# Patient Record
Sex: MALE | Race: WHITE | NOT HISPANIC OR LATINO | Employment: OTHER | URBAN - METROPOLITAN AREA
[De-identification: names, ages, dates, MRNs, and addresses within clinical notes are randomized per-mention and may not be internally consistent; named-entity substitution may affect disease eponyms.]

---

## 2023-01-06 ENCOUNTER — APPOINTMENT (OUTPATIENT)
Dept: CT IMAGING | Facility: CLINIC | Age: 28
End: 2023-01-06
Attending: EMERGENCY MEDICINE

## 2023-01-06 ENCOUNTER — HOSPITAL ENCOUNTER (EMERGENCY)
Facility: CLINIC | Age: 28
Discharge: ANOTHER HOSPITAL | End: 2023-01-06
Attending: EMERGENCY MEDICINE | Admitting: EMERGENCY MEDICINE

## 2023-01-06 ENCOUNTER — HOSPITAL ENCOUNTER (INPATIENT)
Facility: CLINIC | Age: 28
LOS: 4 days | Discharge: HOME | DRG: 167 | End: 2023-01-10
Attending: STUDENT IN AN ORGANIZED HEALTH CARE EDUCATION/TRAINING PROGRAM | Admitting: STUDENT IN AN ORGANIZED HEALTH CARE EDUCATION/TRAINING PROGRAM

## 2023-01-06 VITALS
HEART RATE: 85 BPM | SYSTOLIC BLOOD PRESSURE: 123 MMHG | OXYGEN SATURATION: 97 % | WEIGHT: 210 LBS | DIASTOLIC BLOOD PRESSURE: 58 MMHG | RESPIRATION RATE: 21 BRPM | TEMPERATURE: 98 F

## 2023-01-06 DIAGNOSIS — J98.59 MEDIASTINAL MASS: Primary | ICD-10-CM

## 2023-01-06 DIAGNOSIS — J98.59 MEDIASTINAL MASS: ICD-10-CM

## 2023-01-06 LAB
AFP SERPL-MCNC: 2.6 NG/ML
ALBUMIN SERPL-MCNC: 4 G/DL (ref 3.5–5)
ALP SERPL-CCNC: 57 U/L (ref 45–120)
ALT SERPL W P-5'-P-CCNC: 19 U/L (ref 0–45)
ANION GAP SERPL CALCULATED.3IONS-SCNC: 9 MMOL/L (ref 5–18)
AST SERPL W P-5'-P-CCNC: 15 U/L (ref 0–40)
BASOPHILS # BLD AUTO: 0.1 10E3/UL (ref 0–0.2)
BASOPHILS NFR BLD AUTO: 1 %
BILIRUB SERPL-MCNC: 0.5 MG/DL (ref 0–1)
BUN SERPL-MCNC: 12 MG/DL (ref 8–22)
CALCIUM SERPL-MCNC: 9.7 MG/DL (ref 8.5–10.5)
CHLORIDE BLD-SCNC: 104 MMOL/L (ref 98–107)
CO2 SERPL-SCNC: 26 MMOL/L (ref 22–31)
CREAT SERPL-MCNC: 0.83 MG/DL (ref 0.7–1.3)
D DIMER PPP FEU-MCNC: 1.5 UG/ML FEU (ref 0–0.5)
EOSINOPHIL # BLD AUTO: 0.2 10E3/UL (ref 0–0.7)
EOSINOPHIL NFR BLD AUTO: 1 %
ERYTHROCYTE [DISTWIDTH] IN BLOOD BY AUTOMATED COUNT: 13.7 % (ref 10–15)
GFR SERPL CREATININE-BSD FRML MDRD: >90 ML/MIN/1.73M2
GLUCOSE BLD-MCNC: 110 MG/DL (ref 70–125)
HCT VFR BLD AUTO: 44.9 % (ref 40–53)
HGB BLD-MCNC: 14.8 G/DL (ref 13.3–17.7)
HOLD SPECIMEN: NORMAL
IMM GRANULOCYTES # BLD: 0.1 10E3/UL
IMM GRANULOCYTES NFR BLD: 1 %
LDH SERPL L TO P-CCNC: 663 U/L (ref 125–220)
LYMPHOCYTES # BLD AUTO: 2.4 10E3/UL (ref 0.8–5.3)
LYMPHOCYTES NFR BLD AUTO: 19 %
MCH RBC QN AUTO: 30.3 PG (ref 26.5–33)
MCHC RBC AUTO-ENTMCNC: 33 G/DL (ref 31.5–36.5)
MCV RBC AUTO: 92 FL (ref 78–100)
MONOCYTES # BLD AUTO: 1.5 10E3/UL (ref 0–1.3)
MONOCYTES NFR BLD AUTO: 12 %
NEUTROPHILS # BLD AUTO: 8.6 10E3/UL (ref 1.6–8.3)
NEUTROPHILS NFR BLD AUTO: 66 %
NRBC # BLD AUTO: 0 10E3/UL
NRBC BLD AUTO-RTO: 0 /100
PLATELET # BLD AUTO: 347 10E3/UL (ref 150–450)
POTASSIUM BLD-SCNC: 4.2 MMOL/L (ref 3.5–5)
PROT SERPL-MCNC: 7.6 G/DL (ref 6–8)
RBC # BLD AUTO: 4.89 10E6/UL (ref 4.4–5.9)
SARS-COV-2 RNA RESP QL NAA+PROBE: NEGATIVE
SODIUM SERPL-SCNC: 139 MMOL/L (ref 136–145)
TROPONIN I SERPL-MCNC: <0.01 NG/ML (ref 0–0.29)
WBC # BLD AUTO: 12.9 10E3/UL (ref 4–11)

## 2023-01-06 PROCEDURE — 84702 CHORIONIC GONADOTROPIN TEST: CPT | Performed by: EMERGENCY MEDICINE

## 2023-01-06 PROCEDURE — 36415 COLL VENOUS BLD VENIPUNCTURE: CPT | Performed by: EMERGENCY MEDICINE

## 2023-01-06 PROCEDURE — 82105 ALPHA-FETOPROTEIN SERUM: CPT | Performed by: EMERGENCY MEDICINE

## 2023-01-06 PROCEDURE — 83615 LACTATE (LD) (LDH) ENZYME: CPT | Performed by: EMERGENCY MEDICINE

## 2023-01-06 PROCEDURE — 120N000002 HC R&B MED SURG/OB UMMC

## 2023-01-06 PROCEDURE — U0005 INFEC AGEN DETEC AMPLI PROBE: HCPCS | Performed by: EMERGENCY MEDICINE

## 2023-01-06 PROCEDURE — 250N000013 HC RX MED GY IP 250 OP 250 PS 637: Performed by: EMERGENCY MEDICINE

## 2023-01-06 PROCEDURE — 99222 1ST HOSP IP/OBS MODERATE 55: CPT | Performed by: STUDENT IN AN ORGANIZED HEALTH CARE EDUCATION/TRAINING PROGRAM

## 2023-01-06 PROCEDURE — 99207 PR APP CREDIT; MD BILLING SHARED VISIT: CPT | Performed by: STUDENT IN AN ORGANIZED HEALTH CARE EDUCATION/TRAINING PROGRAM

## 2023-01-06 PROCEDURE — 250N000011 HC RX IP 250 OP 636: Performed by: EMERGENCY MEDICINE

## 2023-01-06 PROCEDURE — 80053 COMPREHEN METABOLIC PANEL: CPT | Performed by: EMERGENCY MEDICINE

## 2023-01-06 PROCEDURE — 93005 ELECTROCARDIOGRAM TRACING: CPT | Performed by: EMERGENCY MEDICINE

## 2023-01-06 PROCEDURE — 85379 FIBRIN DEGRADATION QUANT: CPT | Performed by: EMERGENCY MEDICINE

## 2023-01-06 PROCEDURE — 250N000013 HC RX MED GY IP 250 OP 250 PS 637: Performed by: STUDENT IN AN ORGANIZED HEALTH CARE EDUCATION/TRAINING PROGRAM

## 2023-01-06 PROCEDURE — 71275 CT ANGIOGRAPHY CHEST: CPT

## 2023-01-06 PROCEDURE — 99285 EMERGENCY DEPT VISIT HI MDM: CPT | Mod: 25

## 2023-01-06 PROCEDURE — 84484 ASSAY OF TROPONIN QUANT: CPT | Performed by: EMERGENCY MEDICINE

## 2023-01-06 PROCEDURE — C9803 HOPD COVID-19 SPEC COLLECT: HCPCS

## 2023-01-06 PROCEDURE — 85025 COMPLETE CBC W/AUTO DIFF WBC: CPT | Performed by: EMERGENCY MEDICINE

## 2023-01-06 RX ORDER — IOPAMIDOL 755 MG/ML
75 INJECTION, SOLUTION INTRAVASCULAR ONCE
Status: COMPLETED | OUTPATIENT
Start: 2023-01-06 | End: 2023-01-06

## 2023-01-06 RX ORDER — ONDANSETRON 2 MG/ML
4 INJECTION INTRAMUSCULAR; INTRAVENOUS EVERY 6 HOURS PRN
Status: DISCONTINUED | OUTPATIENT
Start: 2023-01-06 | End: 2023-01-10 | Stop reason: HOSPADM

## 2023-01-06 RX ORDER — ONDANSETRON 4 MG/1
4 TABLET, ORALLY DISINTEGRATING ORAL EVERY 6 HOURS PRN
Status: DISCONTINUED | OUTPATIENT
Start: 2023-01-06 | End: 2023-01-10 | Stop reason: HOSPADM

## 2023-01-06 RX ORDER — LIDOCAINE 40 MG/G
CREAM TOPICAL
Status: DISCONTINUED | OUTPATIENT
Start: 2023-01-06 | End: 2023-01-10 | Stop reason: HOSPADM

## 2023-01-06 RX ORDER — ACETAMINOPHEN 325 MG/1
975 TABLET ORAL ONCE
Status: COMPLETED | OUTPATIENT
Start: 2023-01-06 | End: 2023-01-06

## 2023-01-06 RX ORDER — ACETAMINOPHEN 325 MG/1
975 TABLET ORAL EVERY 4 HOURS PRN
Status: DISCONTINUED | OUTPATIENT
Start: 2023-01-06 | End: 2023-01-09

## 2023-01-06 RX ADMIN — IOPAMIDOL 75 ML: 755 INJECTION, SOLUTION INTRAVENOUS at 08:03

## 2023-01-06 RX ADMIN — Medication 1 MG: at 20:01

## 2023-01-06 RX ADMIN — ACETAMINOPHEN 975 MG: 325 TABLET, FILM COATED ORAL at 20:01

## 2023-01-06 RX ADMIN — ACETAMINOPHEN 975 MG: 325 TABLET ORAL at 14:13

## 2023-01-06 ASSESSMENT — ACTIVITIES OF DAILY LIVING (ADL)
FALL_HISTORY_WITHIN_LAST_SIX_MONTHS: NO
WALKING_OR_CLIMBING_STAIRS_DIFFICULTY: NO
WEAR_GLASSES_OR_BLIND: YES
ADLS_ACUITY_SCORE: 20
TOILETING_ISSUES: NO
DRESSING/BATHING_DIFFICULTY: NO
ADLS_ACUITY_SCORE: 20
ADLS_ACUITY_SCORE: 35
ADLS_ACUITY_SCORE: 20
ADLS_ACUITY_SCORE: 35
DOING_ERRANDS_INDEPENDENTLY_DIFFICULTY: NO
CONCENTRATING,_REMEMBERING_OR_MAKING_DECISIONS_DIFFICULTY: NO
ADLS_ACUITY_SCORE: 35
ADLS_ACUITY_SCORE: 35
CHANGE_IN_FUNCTIONAL_STATUS_SINCE_ONSET_OF_CURRENT_ILLNESS/INJURY: NO
VISION_MANAGEMENT: GLASSES
DIFFICULTY_EATING/SWALLOWING: NO
ADLS_ACUITY_SCORE: 35

## 2023-01-06 ASSESSMENT — ENCOUNTER SYMPTOMS
ABDOMINAL PAIN: 0
DYSURIA: 0
FATIGUE: 0
WEAKNESS: 0
FEVER: 0
COUGH: 1
CHILLS: 0
NAUSEA: 0
SHORTNESS OF BREATH: 0
ARTHRALGIAS: 1
VOMITING: 0
HEADACHES: 0
NUMBNESS: 0

## 2023-01-06 NOTE — ED PROVIDER NOTES
EMERGENCY DEPARTMENT ENCOUNTER      NAME: Guero Collins  AGE: 27 year old male  YOB: 1995  MRN: [id number]  EVALUATION DATE & TIME: 1/6/2023  6:16 AM    PCP: No primary care provider on file.    ED PROVIDER: Jen Covarrubias DO      Chief Complaint   Patient presents with     Chest Pain         FINAL IMPRESSION:  1. Mediastinal mass          ED COURSE & MEDICAL DECISION MAKING:    Pertinent Labs & Imaging studies reviewed. (See chart for details)  6:35 AM I met with the patient and his significant other, obtained history, performed an initial exam, and discussed options and plan for diagnostics and treatment here in the ED.   7:23 AM I rechecked and updated the patient on initial test results. Given positive D-Dimer will obtain CT and patient is agreeable with this plan.   8:30 AM I spoke with the Radiologist about the patient's CT findings.   8:41 PM I paged for Cardiothoracic Surgery.   8:48 AM I rechecked and updated the patient and his significant other on CT results. Discussed plans for Cardiothoracic Surgery consult.   9:28 AM Cardiovascular Surgery was paged by mistake. I spoke with Ivana of Cardiovascular Surgery and she will page out to the team for further evaluation.   9:37 PM I spoke with another member of the Cardiovascular Surgery team, who recommends consulting with Cardiothoracic Surgery. I repaged for Cardiothoracic Surgery.   9:57 AM I spoke with Dr. Paez, [facility] Cardiothoracic Surgery, who recommends transfer to the [facility] for admission. Additionally recommends adding on beta-hCG, alpha-fetoprotein, and LDH, which I have ordered.   10:30 AM I rechecked and updated the patient on my conversation with Cardiothoracic Surgery and the plan for transfer to the [facility] for admission. Patient is in agreement with this plan.   11:42 AM I was updated that there are no inpatient beds available at [facility]. Will repage for Dr. Paez.   1:09 PM I rechecked and updated the patient on the plan.    1:17 PM I spoke with Dr. Bass, [facility] hospitalist, who accepts the patient in transfer for admission at the [facility]. Currently there are no beds available, but they will reassess their bed status later this afternoon and keep us updated.     1:40 PM I updated the patient on the plan for admission.     27 year old male presents to the Emergency Department for evaluation of right-sided chest pain.  Notes symptoms over past 2 to 3 days.  Radiates up into his right shoulder.  Pneumonia dry cough.  No fever.  Endorses 99 temperature a couple days ago.  He has been taking ibuprofen for symptoms.  He feels his symptoms are consistent with prior pneumonia.  No trauma.  He has reproducible pain on exam.  EKG obtained without evidence of arrhythmia or ischemia.  No exertional component.  Troponin is negative.  I feel sufficient rule ACS given length of time since symptom onset.  Mild leukocytosis on labs.  He is low risk Wells but does have a pleuritic component, D-dimer is elevated.  CT pulmonary embolism obtained and shows a large anterior mediastinal mass.  It is 9.7 x 6.8 x 6.5 cm.  Exerts mass-effect upon the heart and superior vena cava with leftward displacement.  Differential includes thymic neoplasm, complex thymic cyst or pericardial cyst.  He does have a small pleural effusion on the right which is likely reactive.  I did discuss with thoracic surgery.  Recommends transfer to the [facility].  Recommends adding on an hCG, AFP and LDH which I have ordered.  There is a delay in getting a bed.  I have him accepted by the hospitalist.  We will reassess bed status later this afternoon, then if no bed may need to touch base again with thoracic surgery to see any further recommendations.    At the conclusion of the encounter I discussed the results of all of the tests and the disposition. The questions were answered. The patient or family acknowledged understanding and was agreeable with the care plan.      Medical Decision Making    History:    Supplemental history from: Significant Other    External Record(s) reviewed: Outpatient Record    Work Up:    Chart documentation includes differential considered and any EKGs or imaging independently interpreted by provider.    In additional to work up documented, I considered the following work up: See chart documentation, if applicable.    External consultation:    Discussion of management with another provider: Cardiothoracic Surgery, Cardiovascular Surgery, Hospitalist, and Radiology    Complicating factors:    Care impacted by chronic illness: N/A    Care affected by social determinants of health: N/A    Disposition considerations: Admit.      MEDICATIONS GIVEN IN THE EMERGENCY:  Medications   iopamidol (ISOVUE-370) solution 75 mL (75 mLs Intravenous Given 1/6/23 0803)       NEW PRESCRIPTIONS STARTED AT TODAY'S ER VISIT  New Prescriptions    No medications on file          =================================================================    HPI    Patient information was obtained from: patient    Use of : N/A         Guero Collins is a 27 year old male with a pertinent history of pneumonia and former tobacco use disorder who presents to this ED via private vehicle with significant other for evaluation of chest pain.    Patient reports a 2-3 day history of right anterior chest wall pain with intermittent radiation up into his right shoulder along with an intermittent dry cough. He has been taking Ibuprofen with mild relief. Patient also endorses a fever two days ago that has since resolved. He reports a similar episode of chest pain in 2017 when he was diagnosed with pneumonia and had symptomatic resolution with a Z-Amauri. He has otherwise had no ongoing symptoms since that time. Denies any recent falls or heavy lifting. Otherwise denies abdominal pain, nausea, vomiting, changes in urinary function, numbness, paresthesias, or any other symptoms or concerns at  "this time.        REVIEW OF SYSTEMS   Review of Systems   Constitutional: Negative for chills, fatigue and fever (resolved).   Respiratory: Positive for cough (dry). Negative for shortness of breath.    Cardiovascular: Positive for chest pain (right sided).   Gastrointestinal: Negative for abdominal pain, nausea and vomiting.   Genitourinary: Negative for dysuria.   Musculoskeletal: Positive for arthralgias (right shoulder).   Skin: Negative.    Neurological: Negative for syncope, weakness, numbness and headaches.        Negative for paresthesias.   All other systems reviewed and are negative.       PAST MEDICAL HISTORY:  Past Medical History:   Diagnosis Date     Anxiety      Pneumonia of right lower lobe due to infectious organism        PAST SURGICAL HISTORY:  No past surgical history on file.        CURRENT MEDICATIONS:    No current outpatient medications on file.       ALLERGIES:  No Known Allergies    FAMILY HISTORY:  No family history on file.    SOCIAL HISTORY:   Tobacco use: Former smoker, quit \"a while ago\".  Drug use: Previous recreational marijuana use, none in the last 6 months.    VITALS:  /58 (BP Location: Left arm)   Pulse 83   Temp 98  F (36.7  C) (Tympanic)   Resp 22   Wt 95.3 kg (210 lb)   SpO2 98%     PHYSICAL EXAM    Physical Exam  Constitutional:       General: He is not in acute distress.  HENT:      Head: Normocephalic and atraumatic.      Mouth/Throat:      Pharynx: Oropharynx is clear.   Eyes:      Pupils: Pupils are equal, round, and reactive to light.   Cardiovascular:      Rate and Rhythm: Normal rate and regular rhythm.      Pulses: Normal pulses.      Heart sounds: Normal heart sounds.   Pulmonary:      Effort: Pulmonary effort is normal.      Breath sounds: Normal breath sounds.   Chest:      Chest wall: Tenderness (right anterior chest wall) present.   Abdominal:      General: Abdomen is flat. Bowel sounds are normal.      Palpations: Abdomen is soft.      Tenderness: " There is no abdominal tenderness.   Musculoskeletal:         General: Normal range of motion.   Skin:     General: Skin is warm and dry.      Capillary Refill: Capillary refill takes less than 2 seconds.   Neurological:      General: No focal deficit present.      Mental Status: He is alert and oriented to person, place, and time.             LAB:  All pertinent labs reviewed and interpreted.  Labs Ordered and Resulted from Time of ED Arrival to Time of ED Departure   D DIMER QUANTITATIVE - Abnormal       Result Value    D-Dimer Quantitative 1.50 (*)    CBC WITH PLATELETS AND DIFFERENTIAL - Abnormal    WBC Count 12.9 (*)     RBC Count 4.89      Hemoglobin 14.8      Hematocrit 44.9      MCV 92      MCH 30.3      MCHC 33.0      RDW 13.7      Platelet Count 347      % Neutrophils 66      % Lymphocytes 19      % Monocytes 12      % Eosinophils 1      % Basophils 1      % Immature Granulocytes 1      NRBCs per 100 WBC 0      Absolute Neutrophils 8.6 (*)     Absolute Lymphocytes 2.4      Absolute Monocytes 1.5 (*)     Absolute Eosinophils 0.2      Absolute Basophils 0.1      Absolute Immature Granulocytes 0.1      Absolute NRBCs 0.0     LACTATE DEHYDROGENASE - Abnormal    Lactate Dehydrogenase 663 (*)    COMPREHENSIVE METABOLIC PANEL - Normal    Sodium 139      Potassium 4.2      Chloride 104      Carbon Dioxide (CO2) 26      Anion Gap 9      Urea Nitrogen 12      Creatinine 0.83      Calcium 9.7      Glucose 110      Alkaline Phosphatase 57      AST 15      ALT 19      Protein Total 7.6      Albumin 4.0      Bilirubin Total 0.5      GFR Estimate >90     TROPONIN I - Normal    Troponin I <0.01     COVID-19 VIRUS (CORONAVIRUS) BY PCR - Normal    SARS CoV2 PCR Negative     HCG TUMOR MARKER   AFP TUMOR MARKER       RADIOLOGY:  Reviewed all pertinent imaging. Please see official radiology report.  CT Chest Pulmonary Embolism w Contrast   Final Result   IMPRESSION:   1.  Large anterior mediastinal lesion. Recommend chest MRI  with contrast and thoracic surgical referral.   2.  No evidence pulmonary embolism.   3.  Small right pleural effusion which could be reactive.      Report called to Dr. Covarrubias at 8:30 AM.          EKG:    Performed at: 06:16    Impression: Sinus rhythm with sinus arrhythmia. Incomplete RBBB. Borderline EKG.      Rate: 82 bpm  Rhythm: Sinus  Axis: 74  NM Interval: 146 ms  QRS Interval: 96 ms  QTc Interval: 394 ms  ST Changes: None  Comparison: No previous EKG available for comparison    I have independently reviewed and interpreted the EKG(s) documented above.        I, Evelyn Alexandra, am serving as a scribe to document services personally performed by Dr. Jen Covarrubias based on my observation and the provider's statements to me. IJen, DO attest that Evelyn Alexandra is acting in a scribe capacity, has observed my performance of the services and has documented them in accordance with my direction.    Jen Covarrubias DO  Emergency Medicine  [facility] EMERGENCY ROOM  [id number] [id number] [phone]  Dept: [phone]      Jen Covarrubias DO  01/06/23 2161

## 2023-01-06 NOTE — PROGRESS NOTES
Pharmacy Note - Admission Medication History    Pertinent Provider Information:     Patient takes no medications.      ______________________________________________________________________    Prior To Admission (PTA) med list completed and updated in EMR.       No outpatient medications have been marked as taking for the 1/6/23 encounter (Hospital Encounter).       Information source(s): Patient and CareEverywhere/SureScripts  Method of interview communication: phone    Summary of Changes to PTA Med List  New: none  Discontinued: Zyrtec and Flonase - one time fill in August 2022, no longer taking  Changed: none    Patient was asked about OTC/herbal products specifically.  PTA med list reflects this.    In the past week, patient estimated taking medication this percent of the time:  N/A    Allergies were reviewed, assessed, and updated with the patient.      Patient does not use any multi-dose medications prior to admission.    The information provided in this note is only as accurate as the sources available at the time of the update(s).    Thank you for the opportunity to participate in the care of this patient.    Jennifer Sharp RPH  1/6/2023 3:24 PM

## 2023-01-06 NOTE — CARE PLAN
Reason for admission: R side chest pain, found to have mediastinal mass requiring transfer & admission to [facility] for work up   Admitted from: [facility] ED   Report received from: Reji ED RN   2 RN skin assessment completed by:Pt declined full skin assessment        -Findings: no overt deficits   Bed Algorithm reevaluated: yes   Was Pulsate ordered?:  No   Care plan (primary problem) and Education initiated: yes   Flu shot ordered (October-April only): Already has most recent vaccine   Detailed Belongings: shoes, pants, sweatshirt, water bottle, wallet w/ cards & small amount of cash, nose piercing, ring, laptop & , phone and , glasses      RN Decompensation Assessment (Repeat at 12 hours)      Mentation:  Exam is notable for normal (baseline) mental status    Respiratory mechanics and oxygenation:  Exam is notable for normal/unchanged breathing pattern and stable oxygen requirements    Cardiovascular/perfusion:   Exam is notable for normal/unchanged cardiovascular/perfusion assessment    Overall estimation of the patient s condition/stability (1 = stable patient, 7 = appears very sick)? 1 - Patient is stable without signs of deterioration

## 2023-01-06 NOTE — PROGRESS NOTES
[facility]  Transfer Triage Note    Date of call: 01/06/23  Time of call: 1:17 PM    Current Patient Location:  ED  Current Level of Care: ED    Vitals: Temp: 98  F (36.7  C) Temp src: Tympanic BP: 123/58 Pulse: 83   Resp: 22 SpO2: 98 %   Weight: 95.3 kg (210 lb)  O2 Device: None (Room air) at    Diagnosis: mediastinal mass  Is COVID-19 a concern? No  Reason for requested transfer: Procedure can be done here and not at referring hospital   Isolation Needs: Contact    Outside Records: Available  Additional records may be faxed to [phone].    Transfer accepted: Yes  Stability of Patient: Patient is vitally stable, with no critical labs, and will likely remain stable throughout the transfer process  Level of Care Needed: Med Surg  Telemetry Needed:  None  Expected Time of Arrival for Transfer: 0-8 hours  Arrival Location:  [facility]    Recommendations for Management and Stabilization: Not needed    Additional Comments: 26 yo male presenting to  ED with new mediastinal mass.  ED provider discussed with thoracic surgery who recommended transfer to [facility] for thoracic and oncology consultations.  Patient with no other significant past medical history.   Transfer accepted.    Aston Bass MD

## 2023-01-06 NOTE — H&P
"[facility]    History and Physical - Hospitalist Service, GOLD TEAM        Date of Admission:  1/6/2023    Assessment & Plan      Guero Collins is a 27 year old male without significant past medical history. He presented to [facility] this morning (1/6) with reports of chest pain. ACS was ruled out but D-Dimer elevated. CT-PE study thus obtained and showed a large 9.7 x 6.8 x 6.5 cm mediastinal mass. ED provider discussed case with thoracic surgery who advised transfer to the [facility] for further evaluation and biopsy with thoracic surgery and also oncology consult.     # Large Mediastinal Mass  Relatively healthy young male with 4 days of R sided chest pain. CT-PE study at OSH showed a large 9.7 x 6.8 x 6.5 cm mediastinal mass. No active B-cell symptoms apart from low grade fever 2 days ago.  LDH elevated at 663. AFP tumor marker normal. No facial flushing or edema concerning for SVC syndrome. Differential includes lymphoma, thymoma, or pericardial cyst.   - HCG tumor marker pending   - Regular diet for now but will make NPO at midnight incase of possible biopsy tomorrow   - Consulted thoracic surgery for biopsy   - Consulted oncology preemptively   - Repeat CBC and CMP in the AM   - Consider obtaining echocardiogram with signs of SVC           Diet: Combination Diet Regular Diet Adult  NPO per Anesthesia Guidelines for Procedure/Surgery Except for: Meds    DVT Prophylaxis: Low Risk/Ambulatory with no VTE prophylaxis indicated  Bryant Catheter: Not present  Lines: None     Cardiac Monitoring: None  Code Status: Full Code      Clinically Significant Risk Factors Present on Admission                       # Obesity: Estimated body mass index is 31.09 kg/m  as calculated from the following:    Height as of this encounter: 1.753 m (5' 9\").    Weight as of this encounter: 95.5 kg (210 lb 8 oz).           Disposition Plan      Expected Discharge Date: 01/08/2023           "      The patient's care was discussed with the Attending Physician, Dr. Schilling, Bedside Nurse, Patient and Patient's Family.    Juan Pablo Diaz PA-C  Hospitalist Service, [facility]  Securely message with brotips (more info)  Text page via [facility] Paging/Directory     ______________________________________________________________________    Chief Complaint   Mediastinal Mass    History is obtained from the patient    History of Present Illness   Guero Collins is a 27 year old male without significant past medical history. He presented to [facility] this morning (1/6) with reports of chest pain. ACS was ruled out but D-Dimer elevated. CT-PE study thus obtained and showed a large 9.7 x 6.8 x 6.5 cm mediastinal mass. ED provider discussed case with thoracic surgery who advised transfer to the [facility] for further evaluation and biopsy with thoracic surgery and also oncology consult.     Pt seen with wife at bedside. He overall reports feeling ok and is in fair spirits. He reports R sided chest pain starting about 4 days ago. It radiated to his right arm. Pain worse with physical activity (lifting objects, exercise) and lying on his back. Pain improved with ibuprofen at home and tylenol in the ED. He does reports some SOB with activity. He does reports 1 low grade fever to 99F. Denies sweats. Denies abdominal pain, N/V/D. He denies family history of cancer, lymphoma or thyroid disease. He did have 1 grandfather with possible heart disease.     Review of Systems    The 10 point Review of Systems is negative other than noted in the HPI or here.      Past Medical History    I have reviewed this patient's medical history and updated it with pertinent information if needed.   Past Medical History:   Diagnosis Date     Anxiety      Pneumonia of right lower lobe due to infectious organism        Past Surgical History   I have reviewed this patient's surgical history and  updated it with pertinent information if needed.  No past surgical history on file.    Social History   I have reviewed this patient's social history and updated it with pertinent information if needed.  Social History     Tobacco Use     Smoking status: Former     Types: Cigarettes   Substance Use Topics     Drug use: Not Currently     Types: Marijuana     Comment: None in the last 6 months       Family History     Denies family hx of cancer, lymphoma or thyroid disease     Prior to Admission Medications   None     Allergies   No Known Allergies    Physical Exam   Vital Signs: Temp: 98.9  F (37.2  C) Temp src: Oral BP: (!) 156/57 Pulse: 80   Resp: 22 SpO2: 97 % O2 Device: None (Room air)    Weight: 210 lbs 8 oz    Constitutional: awake, alert, cooperative, in no acute distress. A&Ox3    Eyes: EOM, PERRLA. Sclera clear, conjunctiva normal. Anicteric   HENT: Normocephalic, without obvious abnormality, atraumatic.   Respiratory: No increased work of breathing. Clear to auscultation bilaterally, no crackles or wheezing  Cardiovascular: RRR. No murmur. No chest wall tenderness.  GI: Soft, non-tender without rebound or guarding. Bowel sounds are active.   Skin: no bruising or bleeding. Normal skin color, No jaundice  Musculoskeletal:  Full range of motion noted.    Neurologic: Cranial nerves II-XII are grossly intact.   Neuropsychiatric: General: normal, calm and normal eye contact. Normal affect        Data   Data reviewed today: I reviewed all medications, new labs and imaging results over the last 24 hours. See A/P for discussion on these results.     Recent Labs   Lab 01/06/23  0656   WBC 12.9*   HGB 14.8   MCV 92         POTASSIUM 4.2   CHLORIDE 104   CO2 26   BUN 12   CR 0.83   ANIONGAP 9   SHIMA 9.7      ALBUMIN 4.0   PROTTOTAL 7.6   BILITOTAL 0.5   ALKPHOS 57   ALT 19   AST 15       Recent Results (from the past 24 hour(s))   CT Chest Pulmonary Embolism w Contrast    Narrative    EXAM: CT CHEST  PULMONARY EMBOLISM W CONTRAST  LOCATION: [facility]  DATE/TIME: 1/6/2023 8:07 AM    INDICATION: Chest pain, elevated dimer  COMPARISON: None.  TECHNIQUE: CT chest pulmonary angiogram during arterial phase injection of IV contrast. Multiplanar reformats and MIP reconstructions were performed. Dose reduction techniques were used.   CONTRAST: Isovue 370 75ml    FINDINGS:  ANGIOGRAM CHEST: Pulmonary arteries are normal caliber and negative for pulmonary emboli. Thoracic aorta is negative for dissection. No CT evidence of right heart strain.    LUNGS AND PLEURA: Small right pleural effusion. This may be reactive. Lungs appear clear.    MEDIASTINUM/AXILLAE: Large right anterior paramediastinal heterogeneous hypodense lesion (43 HU) and is 9.7 x 6.8 x 6.5 cm. This exerts mass effect upon the heart and superior vena cava with leftward displacement. Differential considerations are thymic   neoplasm, complex thymic cyst or pericardial cyst.    CORONARY ARTERY CALCIFICATION: None.    UPPER ABDOMEN: Normal.    MUSCULOSKELETAL: Normal.      Impression    IMPRESSION:  1.  Large anterior mediastinal lesion. Recommend chest MRI with contrast and thoracic surgical referral.  2.  No evidence pulmonary embolism.  3.  Small right pleural effusion which could be reactive.    Report called to Dr. Covarrubias at 8:30 AM.

## 2023-01-06 NOTE — ED TRIAGE NOTES
Pt arrives with chest pain which he awoke from at 5 this morning. Pt states the past 2-3 days he has had some right sided shoulder and neck pain which since moved into his chest. Pain rated 9/10 and is sharp and achy.      Triage Assessment     Row Name 01/06/23 0611       Triage Assessment (Adult)    Airway WDL WDL       Respiratory WDL    Respiratory WDL WDL       Skin Circulation/Temperature WDL    Skin Circulation/Temperature WDL WDL       Cardiac WDL    Cardiac WDL X;chest pain       Chest Pain Assessment    Chest Pain Location shoulder, right;anterior chest, right    Precipitating Factors at rest       Peripheral/Neurovascular WDL    Peripheral Neurovascular WDL WDL       Cognitive/Neuro/Behavioral WDL    Cognitive/Neuro/Behavioral WDL WDL

## 2023-01-07 ENCOUNTER — APPOINTMENT (OUTPATIENT)
Dept: CT IMAGING | Facility: CLINIC | Age: 28
DRG: 167 | End: 2023-01-07
Attending: STUDENT IN AN ORGANIZED HEALTH CARE EDUCATION/TRAINING PROGRAM

## 2023-01-07 ENCOUNTER — APPOINTMENT (OUTPATIENT)
Dept: ULTRASOUND IMAGING | Facility: CLINIC | Age: 28
DRG: 167 | End: 2023-01-07
Attending: STUDENT IN AN ORGANIZED HEALTH CARE EDUCATION/TRAINING PROGRAM

## 2023-01-07 ENCOUNTER — APPOINTMENT (OUTPATIENT)
Dept: MRI IMAGING | Facility: CLINIC | Age: 28
DRG: 167 | End: 2023-01-07
Attending: STUDENT IN AN ORGANIZED HEALTH CARE EDUCATION/TRAINING PROGRAM

## 2023-01-07 LAB
ALBUMIN SERPL BCG-MCNC: 4.3 G/DL (ref 3.5–5.2)
ALP SERPL-CCNC: 54 U/L (ref 40–129)
ALT SERPL W P-5'-P-CCNC: 17 U/L (ref 10–50)
ANION GAP SERPL CALCULATED.3IONS-SCNC: 14 MMOL/L (ref 7–15)
AST SERPL W P-5'-P-CCNC: 20 U/L (ref 10–50)
BILIRUB SERPL-MCNC: 0.5 MG/DL
BUN SERPL-MCNC: 11.3 MG/DL (ref 6–20)
CALCIUM SERPL-MCNC: 9.2 MG/DL (ref 8.6–10)
CHLORIDE SERPL-SCNC: 104 MMOL/L (ref 98–107)
CREAT SERPL-MCNC: 0.79 MG/DL (ref 0.67–1.17)
DEPRECATED HCO3 PLAS-SCNC: 22 MMOL/L (ref 22–29)
ERYTHROCYTE [DISTWIDTH] IN BLOOD BY AUTOMATED COUNT: 13.5 % (ref 10–15)
GFR SERPL CREATININE-BSD FRML MDRD: >90 ML/MIN/1.73M2
GLUCOSE SERPL-MCNC: 114 MG/DL (ref 70–99)
HCT VFR BLD AUTO: 43.7 % (ref 40–53)
HGB BLD-MCNC: 14.2 G/DL (ref 13.3–17.7)
MCH RBC QN AUTO: 29.6 PG (ref 26.5–33)
MCHC RBC AUTO-ENTMCNC: 32.5 G/DL (ref 31.5–36.5)
MCV RBC AUTO: 91 FL (ref 78–100)
PLATELET # BLD AUTO: 334 10E3/UL (ref 150–450)
POTASSIUM SERPL-SCNC: 4 MMOL/L (ref 3.4–5.3)
PROT SERPL-MCNC: 7.1 G/DL (ref 6.4–8.3)
RBC # BLD AUTO: 4.79 10E6/UL (ref 4.4–5.9)
SODIUM SERPL-SCNC: 140 MMOL/L (ref 136–145)
WBC # BLD AUTO: 10.3 10E3/UL (ref 4–11)

## 2023-01-07 PROCEDURE — A9585 GADOBUTROL INJECTION: HCPCS | Performed by: STUDENT IN AN ORGANIZED HEALTH CARE EDUCATION/TRAINING PROGRAM

## 2023-01-07 PROCEDURE — 85027 COMPLETE CBC AUTOMATED: CPT | Performed by: STUDENT IN AN ORGANIZED HEALTH CARE EDUCATION/TRAINING PROGRAM

## 2023-01-07 PROCEDURE — 36415 COLL VENOUS BLD VENIPUNCTURE: CPT | Performed by: STUDENT IN AN ORGANIZED HEALTH CARE EDUCATION/TRAINING PROGRAM

## 2023-01-07 PROCEDURE — 76870 US EXAM SCROTUM: CPT | Mod: 26 | Performed by: RADIOLOGY

## 2023-01-07 PROCEDURE — 120N000002 HC R&B MED SURG/OB UMMC

## 2023-01-07 PROCEDURE — 70491 CT SOFT TISSUE NECK W/DYE: CPT

## 2023-01-07 PROCEDURE — 74177 CT ABD & PELVIS W/CONTRAST: CPT

## 2023-01-07 PROCEDURE — 71260 CT THORAX DX C+: CPT | Mod: 26 | Performed by: RADIOLOGY

## 2023-01-07 PROCEDURE — 93976 VASCULAR STUDY: CPT

## 2023-01-07 PROCEDURE — 74177 CT ABD & PELVIS W/CONTRAST: CPT | Mod: 26 | Performed by: RADIOLOGY

## 2023-01-07 PROCEDURE — 70553 MRI BRAIN STEM W/O & W/DYE: CPT

## 2023-01-07 PROCEDURE — 99418 PROLNG IP/OBS E/M EA 15 MIN: CPT | Mod: GC | Performed by: STUDENT IN AN ORGANIZED HEALTH CARE EDUCATION/TRAINING PROGRAM

## 2023-01-07 PROCEDURE — 70553 MRI BRAIN STEM W/O & W/DYE: CPT | Mod: 26 | Performed by: RADIOLOGY

## 2023-01-07 PROCEDURE — 250N000011 HC RX IP 250 OP 636: Performed by: STUDENT IN AN ORGANIZED HEALTH CARE EDUCATION/TRAINING PROGRAM

## 2023-01-07 PROCEDURE — 99232 SBSQ HOSP IP/OBS MODERATE 35: CPT | Performed by: STUDENT IN AN ORGANIZED HEALTH CARE EDUCATION/TRAINING PROGRAM

## 2023-01-07 PROCEDURE — 99221 1ST HOSP IP/OBS SF/LOW 40: CPT | Mod: GC | Performed by: THORACIC SURGERY (CARDIOTHORACIC VASCULAR SURGERY)

## 2023-01-07 PROCEDURE — 250N000013 HC RX MED GY IP 250 OP 250 PS 637: Performed by: STUDENT IN AN ORGANIZED HEALTH CARE EDUCATION/TRAINING PROGRAM

## 2023-01-07 PROCEDURE — 70491 CT SOFT TISSUE NECK W/DYE: CPT | Mod: 26 | Performed by: RADIOLOGY

## 2023-01-07 PROCEDURE — 255N000002 HC RX 255 OP 636: Performed by: STUDENT IN AN ORGANIZED HEALTH CARE EDUCATION/TRAINING PROGRAM

## 2023-01-07 PROCEDURE — 80053 COMPREHEN METABOLIC PANEL: CPT | Performed by: STUDENT IN AN ORGANIZED HEALTH CARE EDUCATION/TRAINING PROGRAM

## 2023-01-07 PROCEDURE — 99223 1ST HOSP IP/OBS HIGH 75: CPT | Mod: GC | Performed by: STUDENT IN AN ORGANIZED HEALTH CARE EDUCATION/TRAINING PROGRAM

## 2023-01-07 RX ORDER — IOPAMIDOL 755 MG/ML
120 INJECTION, SOLUTION INTRAVASCULAR ONCE
Status: COMPLETED | OUTPATIENT
Start: 2023-01-07 | End: 2023-01-07

## 2023-01-07 RX ORDER — GADOBUTROL 604.72 MG/ML
0.1 INJECTION INTRAVENOUS ONCE
Status: COMPLETED | OUTPATIENT
Start: 2023-01-07 | End: 2023-01-07

## 2023-01-07 RX ADMIN — GADOBUTROL 9.5 ML: 604.72 INJECTION INTRAVENOUS at 16:57

## 2023-01-07 RX ADMIN — IOPAMIDOL 120 ML: 755 INJECTION, SOLUTION INTRAVENOUS at 12:43

## 2023-01-07 RX ADMIN — ACETAMINOPHEN 975 MG: 325 TABLET, FILM COATED ORAL at 13:38

## 2023-01-07 RX ADMIN — ACETAMINOPHEN 975 MG: 325 TABLET, FILM COATED ORAL at 20:17

## 2023-01-07 RX ADMIN — Medication 1 MG: at 20:17

## 2023-01-07 RX ADMIN — ACETAMINOPHEN 975 MG: 325 TABLET, FILM COATED ORAL at 05:07

## 2023-01-07 ASSESSMENT — ACTIVITIES OF DAILY LIVING (ADL)
ADLS_ACUITY_SCORE: 20

## 2023-01-07 NOTE — CONSULTS
27 year old male without significant past medical history. He presented to [facility] this morning (1/6) with reports of chest pain. CT-PE study thus obtained and showed a large 9.7 x 6.8 x 6.5 cm anterior mediastinal mass. Transfer to the [facility] for further evaluation and biopsy with thoracic surgery and also oncology consult. Interventional radiology consulted for possible mediastinal mass biopsy.     No active B-cell symptoms apart from low grade fever 2 days PTA.  LDH elevated at 663. AFP tumor marker normal. No facial flushing or edema concerning for SVC syndrome. Differential includes lymphoma, thymoma, or pericardial cyst.     Patient to be discussed at morning rounds on Monday, 1/9/22.     These findings were conveyed via telephone to Duncan Powell MD on 1/7/23.     Case discussed with IR attending Howard Kuo MD.

## 2023-01-07 NOTE — PROGRESS NOTES
"[facility]    Medicine Progress Note - Hospitalist Service, GOLD TEAM 11    Date of Admission:  1/6/2023    Assessment & Plan   27 year old male without significant past medical history. He presented to [facility] this morning (1/6) with reports of chest pain. CT-PE study thus obtained and showed a large 9.7 x 6.8 x 6.5 cm anterior mediastinal mass. Transfer to the [facility] for further evaluation and biopsy with thoracic surgery and also oncology consult.      # Large Anterior Mediastinal Mass  #  Mass effect upon the heart and superior vena cava with leftward displacement  Relatively healthy young male with 4 days of R sided chest pain. CT-PE study at OSH showed a large 9.7 x 6.8 x 6.5 cm mediastinal mass. No active B-cell symptoms apart from low grade fever 2 days PTA.  LDH elevated at 663. AFP tumor marker normal. No facial flushing or edema concerning for SVC syndrome. Differential includes lymphoma, thymoma, or pericardial cyst.   Plan:  - HCG tumor marker pending   - Thoracic following  - Oncology following. Recommend CT neck and C/A/P with contrast and MRI brain with and without contrast. Scrotal U/S.   - IR consult. Bx on Monday after morning rounds. Per radiology likely to happen then. NPO after midight on Sunday  - TTE ordered       # Obesity: Estimated body mass index is 31.09 kg/m  as calculated from the following:    Height as of this encounter: 1.753 m (5' 9\").    Weight as of this encounter: 95.5 kg (210 lb 8 oz).         Diet: Regular Diet Adult    DVT Prophylaxis: Low Risk/Ambulatory with no VTE prophylaxis indicated  Bryant Catheter: Not present  Lines: None     Cardiac Monitoring: None  Code Status: Full Code      Clinically Significant Risk Factors Present on Admission                              Disposition Plan      Expected Discharge Date: 01/08/2023                  ABRAHAN SUNSHINE MD  Hospitalist Service, GOLD TEAM 11  [facility] " [facility]  Securely message with [employer] (more info)  Text page via [employer] Paging/Directory   See signed in provider for up to date coverage information  ______________________________________________________________________    Interval History   Pt with family members in room. Updated about the consults and pending recs for next steps. Pt still has CP when lying on his back. Fevers and some of the pain is relieved by Tylenol.  Dale sign neg     Physical Exam   Vital Signs: Temp: 99.5  F (37.5  C) Temp src: Oral BP: 137/53 Pulse: 75   Resp: 20 SpO2: 99 % O2 Device: None (Room air)    Weight: 210 lbs 8 oz    Constitutional: Lying in bed with no acute distress   Respiratory: Breathing comfortably on RA    Medical Decision Making       45 MINUTES SPENT BY ME on the date of service doing chart review, history, exam, documentation & further activities per the note.      Data

## 2023-01-07 NOTE — PLAN OF CARE
Goal Outcome Evaluation:    RN assumed cares [id number]    Pt A&Ox4 VSS on RA except HTN. Pt reported pain 9/10 and received Tylenol at 2001, 0507. R PIV SL and flushing well. Pt NPO since midnight and resting between cares.

## 2023-01-07 NOTE — PLAN OF CARE
Pt admitted to unit around 1700, traveled from [facility] via private car. VSS on RA. AOx4. C/o R side chest pain however improved compared to this AM after PRN tylenol- pt says pain is radiating to shoulder less. Reports mild NOEL & intermittent dry cough. Tolerating regular diet. Denies toileting concerns. Ind. NPO at 0000 for possible biopsy of mass in AM. Oncology & thoracic surgery consults placed. Significant other supportive at bedside. Call light within reach, able to make needs known. Will continue to monitor & follow POC    Goal Outcome Evaluation:    Plan of Care Reviewed With: patient    Overall Patient Progress: no change    Outcome Evaluation: Pt admitted to unit this evening for R side chest pain & mediastinal mass. Pain managed w/ PRN tylenol. VSS. Mild NOEL & intermittent dry cough. Plan for biopsy of mass tomorrow

## 2023-01-07 NOTE — CONSULTS
"  Oncology  Consult Note   Date of Service: 01/07/2023    Patient: Guero Collins  MRN: [id number]  Admission Date: 1/6/2023  Hospital Day # 1    Reason for Consult: Mediastinal mass    Assessment & Plan:   Guero Collins is a 27 year old male without significant past medical history who presented with chest pain and found to have a large mediastinal mass.    Mediastinal mass  New large mediastinal mass in an otherwise healthy patient. No B-symptoms, no lymphadenopathy, or scrotal mass on the exam. Differentials for anterior mediastinal mass include germ cell tumor, lymphoma, thyroid or thymic neoplasm, with the first two being higher in our differentials. LDH is elevated. AFP was normal. HCG is pending. At this time, the most important next step is to get the tissue diagnosis. We will complete work-up with scrotal US, CT CAP, and MRI brain. Work-up and treatment can be done outpatient from our standpoint. We will coordinate timing of follow-up at oncology clinic once we know the plan for biopsy.    Recommendations:   - Follow hCG result  - Scrotal US (ordered for you)  - CT neck and CAP with contrast (ordered for you)  - MRI brain with and without contrast (ordered for you)  - Agree with thoracic surgery and IR consult for biopsy. Please obtain \"core biopsy\" (not FNA) and send for pathology and flow cytometry.  - Discharge plan per primary team. Work-up and treatment can be done outpatient from our standpoint. We will coordinate timing of follow-up at oncology clinic once we know the plan for biopsy.    We will continue to follow this patient. Please do not hesitate to page with any questions or concerns.    Patient was seen and plan of care was discussed with attending physician Dr. Resendiz.    Ghulam Becerra MD  Hematology/Medical Oncology (PGY-4)  P: [phone]  ------------------------------------------------------------------------------------------------------------------------------    History of " Present Illness:    Guero Collins is a 27 year old male without significant past medical history who presented with chest pain and found to have a large mediastinal mass.    Patient reports about 4 days of R shoulder pain and 2 days of R-sided chest pain that prompted him to seek medical attention. Pain worse with physical activity (lifting objects, exercise) and lying on his back. Pain improved with ibuprofen at home and tylenol in the ED. He does reports some SOB with activity. He does reports 1 low grade fever to 99F. Have night sweats at baseline. Denies loss of appetite or unexplained weight loss. Denies abdominal pain, N/V/D. Denies enlarged glands or lymph nodes. He denies family history of cancer, lymphoma or thyroid disease.    He presented to [facility] this morning (1/6) with reports of chest pain. ACS was ruled out but D-Dimer elevated. CT-PE study thus obtained and showed a large 9.7 x 6.8 x 6.5 cm mediastinal mass. ED provider discussed case with thoracic surgery who advised transfer to the [facility] for further evaluation and biopsy with thoracic surgery and also oncology consult.     Review of Systems:  A comprehensive ROS was performed and found to be negative or non-contributory with the exception of that noted in the HPI above.    Past Medical History:  Past Medical History:   Diagnosis Date     Anxiety      Pneumonia of right lower lobe due to infectious organism      Past Surgical History:  No past surgical history on file.    Social History:  Social History     Socioeconomic History     Marital status: Single   Tobacco Use     Smoking status: Former     Types: Cigarettes   Substance and Sexual Activity     Drug use: Not Currently     Types: Marijuana     Comment: None in the last 6 months      Family History  No family history on file.    Outpatient Medications:  [COMPLETED] acetaminophen (TYLENOL) tablet 975 mg    No current outpatient medications on file prior to encounter.     Physical  "Exam:    Blood pressure 137/53, pulse 75, temperature 99.5  F (37.5  C), temperature source Oral, resp. rate 20, height 1.753 m (5' 9\"), weight 95.5 kg (210 lb 8 oz), SpO2 99 %.  General: Alert and cooperative,  no acute distress  HEENT: Sclera anicteric, EOMI, MMM  Lymphatic: No cervical, supraclavicular, axillary, or inguinal lymphadenopathy.   CV: RRR, no murmurs  Resp: CTAB, normal respiratory effort on ambient air  GI: Soft, non-tender, non-distended, bowel sounds present and normoactive. No hepatosplenomegaly.  Genital: No scrotal mass.  MSK: Warm and well-perfused, no edema  Skin: No rashes or petechiae  Neuro: AOx3, moves all extremities equally, no focal deficits    Labs & Studies: I personally reviewed the following studies:  ROUTINE LABS (Last four results):  CMP  Recent Labs   Lab 01/07/23  0715 01/06/23  0656    139   POTASSIUM 4.0 4.2   CHLORIDE 104 104   CO2 22 26   ANIONGAP 14 9   * 110   BUN 11.3 12   CR 0.79 0.83   GFRESTIMATED >90 >90   SHIMA 9.2 9.7   PROTTOTAL 7.1 7.6   ALBUMIN 4.3 4.0   BILITOTAL 0.5 0.5   ALKPHOS 54 57   AST 20 15   ALT 17 19     CBC  Recent Labs   Lab 01/07/23  0715 01/06/23  0656   WBC 10.3 12.9*   RBC 4.79 4.89   HGB 14.2 14.8   HCT 43.7 44.9   MCV 91 92   MCH 29.6 30.3   MCHC 32.5 33.0   RDW 13.5 13.7    347     Labs, images, and pathology results were reviewed and discussed as pertinent in oncologic history and patient assessment.    "

## 2023-01-07 NOTE — CONSULTS
Thoracic Surgery Consultation Note  [facility]    Guero Collins MRN# [id number]   Age: 27 year old YOB: 1995     Date of Admission:  1/6/2023    Reason for consult: Anterior mediastinal mass       Requesting physician: Dr. Schilling       Level of consult: Consult, follow and place orders           Assessment and Recommendations   27M who presented with chest pain 1/6/23 found to have an anterior mediastinal mass. No B-symptoms, , AFP wnl, beta-HCG pending at time of note, no signs/symptoms of SVC syndrome, and chest pain has now largely resolved.     - IR consult for possible biopsy  - Agree with oncology consult  - Pending findings with biopsy, thoracic surgery will discuss further plans with multidisciplinary team  - If unable to get biopsy over the weekend, patient could potentially go home and have this workup completed as an outpatient.    Seen and discussed with fellow who will discuss with staff.     Lefty Mix MD  Surgery PGY-3          History of Present Illness:   CC: anterior mediastinal mass     History is obtained from the patient    27M who presented with chest pain 1/6 found to have an anterior mediastinal mass. No B-symptoms, , AFP wnl, beta-HCG pending at time of note, no signs/symptoms of SVC syndrome, and chest pain has now largely resolved. Denies any history of testicular cancer or family history of testicular cancer. Has scalp lesions (3) that he states have been there for years and have not been enlarging. No known family history of lymphoma, thymoma.     Seen in his room with his fiance. Explained thoracic surgery involvement for these cases and how more information would need to be gathered prior to discussing any sort of operative intervention. All questions answered.           Past Medical History:     Past Medical History:   Diagnosis Date     Anxiety      Pneumonia of right lower lobe due to infectious organism              Past  "Surgical History:   No past surgical history on file.          Social History:     Social History     Socioeconomic History     Marital status: Single     Spouse name: Not on file     Number of children: Not on file     Years of education: Not on file     Highest education level: Not on file   Occupational History     Not on file   Tobacco Use     Smoking status: Former     Types: Cigarettes     Smokeless tobacco: Not on file   Substance and Sexual Activity     Alcohol use: Not on file     Drug use: Not Currently     Types: Marijuana     Comment: None in the last 6 months     Sexual activity: Not on file   Other Topics Concern     Not on file   Social History Narrative     Not on file     Social Determinants of Health     Financial Resource Strain: Not on file   Food Insecurity: Not on file   Transportation Needs: Not on file   Physical Activity: Not on file   Stress: Not on file   Social Connections: Not on file   Intimate Partner Violence: Not on file   Housing Stability: Not on file             Family History:   No family history on file.          Allergies:    No Known Allergies          Medications:   [COMPLETED] acetaminophen (TYLENOL) tablet 975 mg  [COMPLETED] iopamidol (ISOVUE-370) solution 75 mL    No current outpatient medications on file prior to encounter.            Review of Systems:      All other review of systems negative, except for what is mentioned above        Physical Exam:   /53 (BP Location: Left arm)   Pulse 75   Temp 99.5  F (37.5  C) (Oral)   Resp 20   Ht 1.753 m (5' 9\")   Wt 95.5 kg (210 lb 8 oz)   SpO2 99%   BMI 31.09 kg/m    General: Alert, interactive, NAD  HEENT: no facial swelling or discoloration  Resp: Non-labored on room air, no tenderness to palpation  Cardiac: Regular rate  Abdomen: Soft, nontender, nondistended.  Extremities: No LE edema or obvious joint abnormalities  Skin: Warm and dry, no jaundice or rash  Neuro: A&Ox3, CN 2-12 intact, TODD          Data: "     Results for orders placed or performed during the hospital encounter of 01/06/23 (from the past 24 hour(s))   Comprehensive metabolic panel   Result Value Ref Range    Sodium 140 136 - 145 mmol/L    Potassium 4.0 3.4 - 5.3 mmol/L    Chloride 104 98 - 107 mmol/L    Carbon Dioxide (CO2) 22 22 - 29 mmol/L    Anion Gap 14 7 - 15 mmol/L    Urea Nitrogen 11.3 6.0 - 20.0 mg/dL    Creatinine 0.79 0.67 - 1.17 mg/dL    Calcium 9.2 8.6 - 10.0 mg/dL    Glucose 114 (H) 70 - 99 mg/dL    Alkaline Phosphatase 54 40 - 129 U/L    AST 20 10 - 50 U/L    ALT 17 10 - 50 U/L    Protein Total 7.1 6.4 - 8.3 g/dL    Albumin 4.3 3.5 - 5.2 g/dL    Bilirubin Total 0.5 <=1.2 mg/dL    GFR Estimate >90 >60 mL/min/1.73m2   CBC with platelets   Result Value Ref Range    WBC Count 10.3 4.0 - 11.0 10e3/uL    RBC Count 4.79 4.40 - 5.90 10e6/uL    Hemoglobin 14.2 13.3 - 17.7 g/dL    Hematocrit 43.7 40.0 - 53.0 %    MCV 91 78 - 100 fL    MCH 29.6 26.5 - 33.0 pg    MCHC 32.5 31.5 - 36.5 g/dL    RDW 13.5 10.0 - 15.0 %    Platelet Count 334 150 - 450 10e3/uL   US Testicular & Scrotum w Doppler Ltd    Narrative    EXAMINATION: US TESTICULAR AND SCROTAL, 1/7/2023 12:12 PM     COMPARISON: None.    HISTORY: Evaluate for germ cell tumor    TECHNIQUE: The scrotum was scanned in standard fashion with  specialized ultrasound transducer(s) using grey scale, color Doppler,  and spectral flow  techniques.    Findings:  The testes demonstrate normal and symmetric echotexture and  vascularity. No evidence of a focal lesion.  The right testicle  measures 4.7 x 3 x 2 cm cm and the left measures 4.5 x 3.1 x 2.1 cm.  There is no evidence of torsion.    There is no evidence of a significant hydrocele or varicocele.    Both the right and left epididymis are within normal limits.      Impression    Impression:   1.  Normal testicular ultrasound.    ESPERANZA URBINA MD         SYSTEM ID:  [id number]        STAFF ADDENDUM:  I saw and evaluated Mr. Collins and agree with the  resident s findings and plan of care as documented in the resident s note and edited by me, as applicable.      In summary, Mr. Collins has an anterior mediastinal mass and negative serum markers. We plan for a core needle biopsy.  I spent a total of 45 minutes with Mr. Collins, 35 of which were spent in counseling and coordination of care. The patient had all questions answered and was in agreement with the plan.  Larry Paez MD

## 2023-01-08 ENCOUNTER — APPOINTMENT (OUTPATIENT)
Dept: CARDIOLOGY | Facility: CLINIC | Age: 28
DRG: 167 | End: 2023-01-08
Attending: STUDENT IN AN ORGANIZED HEALTH CARE EDUCATION/TRAINING PROGRAM

## 2023-01-08 LAB
ALBUMIN SERPL BCG-MCNC: 4.1 G/DL (ref 3.5–5.2)
ALBUMIN UR-MCNC: NEGATIVE MG/DL
ALP SERPL-CCNC: 52 U/L (ref 40–129)
ALT SERPL W P-5'-P-CCNC: 14 U/L (ref 10–50)
ANION GAP SERPL CALCULATED.3IONS-SCNC: 15 MMOL/L (ref 7–15)
APPEARANCE UR: CLEAR
AST SERPL W P-5'-P-CCNC: 18 U/L (ref 10–50)
BILIRUB SERPL-MCNC: 0.4 MG/DL
BILIRUB UR QL STRIP: NEGATIVE
BUN SERPL-MCNC: 6.5 MG/DL (ref 6–20)
CALCIUM SERPL-MCNC: 9.3 MG/DL (ref 8.6–10)
CHLORIDE SERPL-SCNC: 103 MMOL/L (ref 98–107)
COLOR UR AUTO: ABNORMAL
CREAT SERPL-MCNC: 0.75 MG/DL (ref 0.67–1.17)
DEPRECATED HCO3 PLAS-SCNC: 20 MMOL/L (ref 22–29)
ERYTHROCYTE [DISTWIDTH] IN BLOOD BY AUTOMATED COUNT: 13.4 % (ref 10–15)
GFR SERPL CREATININE-BSD FRML MDRD: >90 ML/MIN/1.73M2
GLUCOSE SERPL-MCNC: 116 MG/DL (ref 70–99)
GLUCOSE UR STRIP-MCNC: NEGATIVE MG/DL
HCT VFR BLD AUTO: 42.9 % (ref 40–53)
HGB BLD-MCNC: 13.8 G/DL (ref 13.3–17.7)
HGB UR QL STRIP: NEGATIVE
KETONES UR STRIP-MCNC: NEGATIVE MG/DL
LEUKOCYTE ESTERASE UR QL STRIP: NEGATIVE
LVEF ECHO: NORMAL
MCH RBC QN AUTO: 29.7 PG (ref 26.5–33)
MCHC RBC AUTO-ENTMCNC: 32.2 G/DL (ref 31.5–36.5)
MCV RBC AUTO: 93 FL (ref 78–100)
MUCOUS THREADS #/AREA URNS LPF: PRESENT /LPF
NITRATE UR QL: NEGATIVE
PH UR STRIP: 6.5 [PH] (ref 5–7)
PLATELET # BLD AUTO: 353 10E3/UL (ref 150–450)
POTASSIUM SERPL-SCNC: 4.1 MMOL/L (ref 3.4–5.3)
PROT SERPL-MCNC: 7 G/DL (ref 6.4–8.3)
RBC # BLD AUTO: 4.64 10E6/UL (ref 4.4–5.9)
RBC URINE: 1 /HPF
SODIUM SERPL-SCNC: 138 MMOL/L (ref 136–145)
SP GR UR STRIP: 1.01 (ref 1–1.03)
UROBILINOGEN UR STRIP-MCNC: NORMAL MG/DL
WBC # BLD AUTO: 11.1 10E3/UL (ref 4–11)
WBC URINE: 1 /HPF

## 2023-01-08 PROCEDURE — 81003 URINALYSIS AUTO W/O SCOPE: CPT | Performed by: STUDENT IN AN ORGANIZED HEALTH CARE EDUCATION/TRAINING PROGRAM

## 2023-01-08 PROCEDURE — 84550 ASSAY OF BLOOD/URIC ACID: CPT | Performed by: PHYSICIAN ASSISTANT

## 2023-01-08 PROCEDURE — 84100 ASSAY OF PHOSPHORUS: CPT | Performed by: PHYSICIAN ASSISTANT

## 2023-01-08 PROCEDURE — 255N000002 HC RX 255 OP 636: Performed by: STUDENT IN AN ORGANIZED HEALTH CARE EDUCATION/TRAINING PROGRAM

## 2023-01-08 PROCEDURE — 85014 HEMATOCRIT: CPT | Performed by: STUDENT IN AN ORGANIZED HEALTH CARE EDUCATION/TRAINING PROGRAM

## 2023-01-08 PROCEDURE — 80053 COMPREHEN METABOLIC PANEL: CPT | Performed by: STUDENT IN AN ORGANIZED HEALTH CARE EDUCATION/TRAINING PROGRAM

## 2023-01-08 PROCEDURE — 999N000208 ECHOCARDIOGRAM COMPLETE

## 2023-01-08 PROCEDURE — 250N000013 HC RX MED GY IP 250 OP 250 PS 637: Performed by: STUDENT IN AN ORGANIZED HEALTH CARE EDUCATION/TRAINING PROGRAM

## 2023-01-08 PROCEDURE — 99232 SBSQ HOSP IP/OBS MODERATE 35: CPT | Performed by: STUDENT IN AN ORGANIZED HEALTH CARE EDUCATION/TRAINING PROGRAM

## 2023-01-08 PROCEDURE — 93306 TTE W/DOPPLER COMPLETE: CPT | Mod: 26 | Performed by: STUDENT IN AN ORGANIZED HEALTH CARE EDUCATION/TRAINING PROGRAM

## 2023-01-08 PROCEDURE — 36415 COLL VENOUS BLD VENIPUNCTURE: CPT | Performed by: STUDENT IN AN ORGANIZED HEALTH CARE EDUCATION/TRAINING PROGRAM

## 2023-01-08 PROCEDURE — 120N000002 HC R&B MED SURG/OB UMMC

## 2023-01-08 RX ORDER — MULTIPLE VITAMINS W/ MINERALS TAB 9MG-400MCG
1 TAB ORAL DAILY
Status: ON HOLD | COMMUNITY
End: 2023-02-17

## 2023-01-08 RX ADMIN — ACETAMINOPHEN 975 MG: 325 TABLET, FILM COATED ORAL at 14:51

## 2023-01-08 RX ADMIN — HUMAN ALBUMIN MICROSPHERES AND PERFLUTREN 6 ML: 10; .22 INJECTION, SOLUTION INTRAVENOUS at 09:31

## 2023-01-08 RX ADMIN — ACETAMINOPHEN 975 MG: 325 TABLET, FILM COATED ORAL at 22:34

## 2023-01-08 ASSESSMENT — ACTIVITIES OF DAILY LIVING (ADL)
ADLS_ACUITY_SCORE: 20

## 2023-01-08 NOTE — PLAN OF CARE
"RN assumed cares: [phone]     Vitals: VSS on RA   Neuro: AOx4  Pain: Reports 2-3/10 R chest pain today in addition to sore neck & intermittent headache- PRN tylenol given x1 & heating pad used on neck   Cardiovascular: WDL ex chest pain   Respiratory: WDL  GI/: Tolerating regular diet. Uses bathroom ind, denies B/B concerns   Musculoskeletal: Ind   Skin: WDL  Lines/Drains: R PIV SL      Events & Plan: Pt visiting w/ family in room majority of shift. Echo completed. Urine sample collected & sent to lab. C/o sore neck this AM which he attributes to sleeping position- also c/o headache in afternoon which he reports has been on & off \"since he got sick\".  NPO at 0000 w/ plan for biopsy tomorrow & likely discharge after. Call light within reach, able to make needs known. Will continue to monitor & follow POC     Goal Outcome Evaluation:    Plan of Care Reviewed With: patient    Overall Patient Progress: no change    Outcome Evaluation: Chest pain improved today, given tylenol for sore neck & headache w/ improvement. Echo done today. UA sent to lab. Plan for biopsy tomorrow w/ likely discharge after      "

## 2023-01-08 NOTE — PROGRESS NOTES
"[facility]    Medicine Progress Note - Hospitalist Service, GOLD TEAM 11    Date of Admission:  1/6/2023    Assessment & Plan   27 year old male without significant past medical history. He presented to [facility] this morning (1/6) with reports of chest pain. CT-PE study thus obtained and showed a large 9.7 x 6.8 x 6.5 cm anterior mediastinal mass. Transfer to the [facility] for further evaluation and biopsy with thoracic surgery and also oncology consult.      # Large Anterior Mediastinal Mass  #  Mass effect upon the heart and superior vena cava with leftward displacement  Relatively healthy young male with 4 days of R sided chest pain. CT-PE study at OSH showed a large 9.7 x 6.8 x 6.5 cm mediastinal mass. No active B-cell symptoms apart from low grade fever 2 days PTA.  LDH elevated at 663. AFP tumor marker normal. No facial flushing or edema concerning for SVC syndrome. Differential includes lymphoma, thymoma, or pericardial cyst. Work up including  CT neck and C/A/P with contrast and MRI brain with and without contrast. Scrotal U/S neg.    Plan:  - HCG tumor marker pending   - Thoracic following  - Oncology following.   - IR consult. Bx on Monday after morning rounds. Per radiology likely to happen then. NPO after midight  - TTE ordered pending  - UA ordered       # Obesity: Estimated body mass index is 31.09 kg/m  as calculated from the following:    Height as of this encounter: 1.753 m (5' 9\").    Weight as of this encounter: 95.5 kg (210 lb 8 oz).         Diet: Regular Diet Adult    DVT Prophylaxis: Low Risk/Ambulatory with no VTE prophylaxis indicated  Bryant Catheter: Not present  Lines: None     Cardiac Monitoring: None  Code Status: Full Code      Clinically Significant Risk Factors                                Disposition Plan      Expected Discharge Date: 01/09/2023        Discharge Comments: Dispo: Likely home after bx          ABRAHAN SUNSHINE" MD  Hospitalist Service, GOLD TEAM 11  [facility]  Securely message with [employer] (more info)  Text page via [employer] Paging/Directory   See signed in provider for up to date coverage information  ______________________________________________________________________    Interval History   No acute events overnight. No active complaints. NPO after midnight for Bx tomorrow after discussion in AM.     Physical Exam   Vital Signs: Temp: 98.1  F (36.7  C) Temp src: Oral BP: 121/67 Pulse: 91   Resp: 18 SpO2: 97 % O2 Device: None (Room air)    Weight: 209 lbs 9.6 oz    Constitutional: Lying in bed with no acute distress   Respiratory: Breathing comfortably on RA    Medical Decision Making       45 MINUTES SPENT BY ME on the date of service doing chart review, history, exam, documentation & further activities per the note.      Data

## 2023-01-08 NOTE — COMMUNITY RESOURCES LIST (ENGLISH)
01/08/2023   [facility] - Care Management  N/A Phone: N/A   Email: [email or web address]   Address: [address] [place] [address]   Hours: N/A        Financial Stability       Emergency-only financial assistance  1  [facility]  Office - [facility] Distance: 0.13 miles      In-Person, Phone/Virtual   [address] [place] [address]  Language: English  Hours: Mon - Fri 8:00 AM - 12:00 PM , Mon - Fri 1:00 PM - 4:00 PM  Fees: Free   Phone: (791) 993-4732 Website: [email or web address]/social-services-office-[place]/     2  [facility] Services - Economic Support Distance: 1.29 miles      In-Person, Phone/Virtual   [address] [place] [address]  Language: English  Hours: Mon - Fri 8:00 AM - 4:30 PM  Fees: Free   Phone: (127) 392-1394 Email: melody@[facility]. Website: https://www.[facility]./787/Economic-Support     Health insurance application assistance  3  [facility] - Health Insurance Application Assistance Distance: 5.76 miles      Phone/Virtual   [address] [place] [address]  Language: Italian, English, Hmong, Kamryn, Costa Rican, Montserratian, Libyan  Hours: Mon - Fri 8:30 AM - 4:00 PM  Fees: Free   Phone: (442) 873-1498 Website: http://neighb.org/programs/     4  [facility] - Medicare Distance: 6.63 miles      Phone/Virtual   [address] [place] [address]  Language: English  Hours: Mon - Fri 9:00 AM - 4:00 PM  Fees: Free   Phone: (466) 233-7308 Website: http://www.ssa.gov     Medical expense assistance  5  [employer] [facility]) - [place] Office - Medical Flex Fund Program Distance: 6.26 miles      Phone/Virtual   [address] [place] [address]  Language: English, Hmong, Kamryn, [place] Rican  Hours: Mon - Fri 8:30 AM - 5:00 PM  Fees: Free   Phone: (864) 111-3462 Email:  breezy@[employer].org Website: http://www.[employer].org/[facility]-impact-areas/     6  [facility] - Emergency Care Mini - [place] Program Distance: 6.4 miles      In-Person   [address] [place] [address]  Language: English, Hmong, Kamryn, Swedish  Hours: Mon - Thu 8:15 AM - 5:00 PM , Fri 8:15 AM - 2:00 PM  Fees: Free   Phone: (217) 253-6881 Email: infosp@[employer] Website: http://www.[employer]/          Important Numbers & Websites       Emergency Services   911  [facility]   311  Poison Control   (851) 428-7806  Suicide Prevention Lifeline   (182) 214-2952 (TALK)  Child Abuse Hotline   (519) 970-5942 (4-A-Child)  Sexual Assault Hotline   (382) 802-3489 (HOPE)  National Runaway Safeline   (665) 372-7053 (RUNAWAY)  All-Options Talkline   (724) 284-1088  Substance Abuse Referral   (862) 280-6357 (HELP)

## 2023-01-08 NOTE — PROGRESS NOTES
Social Work Brief Note:     spoke with G11 provider. Pt will likely discharge tomorrow after his biopsy.      called patient's room phone to ask whether he has a PCP. He requested SW set one up for him near his home. He indicated that he is uninsured at this time and wondered if the hospital has a compassionate care fund to assist him while he and his wife try and sort that out.    SW completed a referral to [facility] 5th floor Financial Counselor requesting follow-up on 1/9 with the patient to assess for MA eligibility and the [facility] Tish Care Fund. SW provided the patient website and phone information regarding billing and our Tish Care Fund eligibility as well as community financial resources to consider utilizing.    JAMI completed request to [facility] to set up pt with a PCP.   __________________________     MIGUEL Hoskins, LICSW  Weekend Units 5A & 5B Social Work Coverage ([id number])   Pager: [phone]   Ph: [phone]  Message me on Vocera.    Weekend 5A & 5B RNCC Coverage ([id number])   Pager: [phone]     Weekdays after hours (1630 - 0000), Saturday & Sunday after hours ([phone]), & [facility] Recognized Holidays Coverage    Pager: [phone]

## 2023-01-08 NOTE — PLAN OF CARE
RN assumed cares: [id number]    Vitals: VSS on RA   Neuro: AOx4  Pain: Reports 3/10 R chest pain today- PRN tylenol given x1  Cardiovascular: WDL ex chest pain   Respiratory: WDL  GI/: Tolerating regular diet. No BM this shift. Voiding spontaneously   Musculoskeletal: Ind   Skin: WDL  Lines/Drains: R PIV SL     Events & Plan: No Biopsy today, diet changed from NPO to regular. Oncology & thoracic surg consults done. Brain MRI, CT neck & CAP, & scrotal US done. Still needs ECHO/EDDIE. Plan for biopsy mon 1/9, NPO Sunday at midnight. Mother & significant other supportive a bedside. Call light within reach, able to make needs known. Will continue to monitor & follow POC       Goal Outcome Evaluation:    Plan of Care Reviewed With: patient    Overall Patient Progress: no change    Outcome Evaluation: Chest pain well managed today, given tylenol x1 this shift. Oncology & Thoracic surg consults done. Cont workup for mediastinal mass including CT neck & CAP, brain MRI, testical & scrotum ultrasound- still needs ECHO/EDDIE done. Plan for biopsy monday 1/9

## 2023-01-08 NOTE — PROGRESS NOTES
"THORACIC SURGERY PROGRESS NOTE     S: No acute events overnight. Pt denies being in any acute distress, and had questions about possible bx w/ IR.      O: /67 (BP Location: Left arm)   Pulse 91   Temp 98.1  F (36.7  C) (Oral)   Resp 18   Ht 1.753 m (5' 9\")   Wt 95.1 kg (209 lb 9.6 oz)   SpO2 97%   BMI 30.95 kg/m      GENERAL: NAD, resting comfortably in bed  HEENT: atraumatic, normocephalic, no scleral icterus  CARDIO: normal rate and regular rhythm, no LE edema  PULM: no increased WOB, CTAB  ABD: non-distended, soft, and non-tender  NEURO: CN II-XII grossly intact, no focal neuro deficits  PSYCH: appropriate affect and behavior     A/P: 27M who presented with chest pain 1/6/23 found to have an anterior mediastinal mass.     - IR c/s, will discuss pt on 1/09  - we will continue to follow     Seen with thoracic fellow, will discuss with staff.    Alexa Rooney,   General Surgery, PGY-1  [phone]        "

## 2023-01-08 NOTE — PLAN OF CARE
[id number] No major events overnight. Rated pain 2/10; tylenol given. Sleep promoted. Able to make needs known. Denied any acute changes in symptoms. Stable on RA. Call light within reach. Q1hr rounding completed [id number]. Q2hr rounding completed [id number].     Goal Outcome Evaluation:           Overall Patient Progress: improvingOverall Patient Progress: improving    Outcome Evaluation: Rated pain 2/10. Plan for echo today.

## 2023-01-08 NOTE — PHARMACY-ADMISSION MEDICATION HISTORY
Admission Medication History Completed by Pharmacy    See [facility] Admission Navigator for allergy information, preferred outpatient pharmacy, prior to admission medications and immunization status.     Medication History Sources:     Patient    Pharmacy filling history    Changes made to PTA medication list (reason):    Added: MVI    Deleted: None    Changed: None    Additional Information:    Guero confirms only taking a multiple vitamin regularly pta    Prior to Admission medications    Medication Sig Last Dose Taking? Auth Provider Long Term End Date   multivitamin w/minerals (MULTI-VITAMIN) tablet Take 1 tablet by mouth daily  Yes Unknown, Entered By History         Date completed: 01/08/23    Medication history completed by:   Kenny SmileyD, [facility]    [phone]  Pager [phone]

## 2023-01-09 ENCOUNTER — APPOINTMENT (OUTPATIENT)
Dept: INTERVENTIONAL RADIOLOGY/VASCULAR | Facility: CLINIC | Age: 28
DRG: 167 | End: 2023-01-09
Attending: NURSE PRACTITIONER

## 2023-01-09 LAB
ALBUMIN SERPL BCG-MCNC: 4.2 G/DL (ref 3.5–5.2)
ALP SERPL-CCNC: 56 U/L (ref 40–129)
ALT SERPL W P-5'-P-CCNC: 14 U/L (ref 10–50)
ANION GAP SERPL CALCULATED.3IONS-SCNC: 13 MMOL/L (ref 7–15)
AST SERPL W P-5'-P-CCNC: 16 U/L (ref 10–50)
BILIRUB SERPL-MCNC: 0.3 MG/DL
BUN SERPL-MCNC: 9.4 MG/DL (ref 6–20)
CALCIUM SERPL-MCNC: 9.8 MG/DL (ref 8.6–10)
CHLORIDE SERPL-SCNC: 106 MMOL/L (ref 98–107)
CREAT SERPL-MCNC: 0.73 MG/DL (ref 0.67–1.17)
DEPRECATED HCO3 PLAS-SCNC: 21 MMOL/L (ref 22–29)
ERYTHROCYTE [DISTWIDTH] IN BLOOD BY AUTOMATED COUNT: 13.3 % (ref 10–15)
GFR SERPL CREATININE-BSD FRML MDRD: >90 ML/MIN/1.73M2
GLUCOSE SERPL-MCNC: 109 MG/DL (ref 70–99)
HCG-TM SERPL-ACNC: <3 IU/L
HCT VFR BLD AUTO: 43.8 % (ref 40–53)
HGB BLD-MCNC: 14 G/DL (ref 13.3–17.7)
INR PPP: 1.09 (ref 0.85–1.15)
MCH RBC QN AUTO: 29.6 PG (ref 26.5–33)
MCHC RBC AUTO-ENTMCNC: 32 G/DL (ref 31.5–36.5)
MCV RBC AUTO: 93 FL (ref 78–100)
PHOSPHATE SERPL-MCNC: 3.2 MG/DL (ref 2.5–4.5)
PLATELET # BLD AUTO: 379 10E3/UL (ref 150–450)
POTASSIUM SERPL-SCNC: 4.3 MMOL/L (ref 3.4–5.3)
PROT SERPL-MCNC: 7.4 G/DL (ref 6.4–8.3)
RBC # BLD AUTO: 4.73 10E6/UL (ref 4.4–5.9)
SODIUM SERPL-SCNC: 140 MMOL/L (ref 136–145)
URATE SERPL-MCNC: 4.7 MG/DL (ref 3.4–7)
WBC # BLD AUTO: 10.2 10E3/UL (ref 4–11)

## 2023-01-09 PROCEDURE — 77012 CT SCAN FOR NEEDLE BIOPSY: CPT | Mod: 26 | Performed by: STUDENT IN AN ORGANIZED HEALTH CARE EDUCATION/TRAINING PROGRAM

## 2023-01-09 PROCEDURE — 250N000013 HC RX MED GY IP 250 OP 250 PS 637: Performed by: STUDENT IN AN ORGANIZED HEALTH CARE EDUCATION/TRAINING PROGRAM

## 2023-01-09 PROCEDURE — 88305 TISSUE EXAM BY PATHOLOGIST: CPT | Mod: 26 | Performed by: STUDENT IN AN ORGANIZED HEALTH CARE EDUCATION/TRAINING PROGRAM

## 2023-01-09 PROCEDURE — 99232 SBSQ HOSP IP/OBS MODERATE 35: CPT | Performed by: STUDENT IN AN ORGANIZED HEALTH CARE EDUCATION/TRAINING PROGRAM

## 2023-01-09 PROCEDURE — 250N000013 HC RX MED GY IP 250 OP 250 PS 637: Performed by: PEDIATRICS

## 2023-01-09 PROCEDURE — 36415 COLL VENOUS BLD VENIPUNCTURE: CPT | Performed by: STUDENT IN AN ORGANIZED HEALTH CARE EDUCATION/TRAINING PROGRAM

## 2023-01-09 PROCEDURE — 20206 BIOPSY MUSCLE PERQ NEEDLE: CPT | Mod: GC | Performed by: STUDENT IN AN ORGANIZED HEALTH CARE EDUCATION/TRAINING PROGRAM

## 2023-01-09 PROCEDURE — 272N000505 HC NEEDLE CR5

## 2023-01-09 PROCEDURE — 85027 COMPLETE CBC AUTOMATED: CPT | Performed by: STUDENT IN AN ORGANIZED HEALTH CARE EDUCATION/TRAINING PROGRAM

## 2023-01-09 PROCEDURE — 88342 IMHCHEM/IMCYTCHM 1ST ANTB: CPT | Mod: TC | Performed by: PHYSICIAN ASSISTANT

## 2023-01-09 PROCEDURE — 88341 IMHCHEM/IMCYTCHM EA ADD ANTB: CPT | Mod: 26 | Performed by: STUDENT IN AN ORGANIZED HEALTH CARE EDUCATION/TRAINING PROGRAM

## 2023-01-09 PROCEDURE — 250N000009 HC RX 250: Performed by: STUDENT IN AN ORGANIZED HEALTH CARE EDUCATION/TRAINING PROGRAM

## 2023-01-09 PROCEDURE — 99152 MOD SED SAME PHYS/QHP 5/>YRS: CPT

## 2023-01-09 PROCEDURE — 80053 COMPREHEN METABOLIC PANEL: CPT | Performed by: STUDENT IN AN ORGANIZED HEALTH CARE EDUCATION/TRAINING PROGRAM

## 2023-01-09 PROCEDURE — 120N000002 HC R&B MED SURG/OB UMMC

## 2023-01-09 PROCEDURE — 88184 FLOWCYTOMETRY/ TC 1 MARKER: CPT | Performed by: PATHOLOGY

## 2023-01-09 PROCEDURE — 88189 FLOWCYTOMETRY/READ 16 & >: CPT | Performed by: PATHOLOGY

## 2023-01-09 PROCEDURE — 0WBC3ZX EXCISION OF MEDIASTINUM, PERCUTANEOUS APPROACH, DIAGNOSTIC: ICD-10-PCS | Performed by: STUDENT IN AN ORGANIZED HEALTH CARE EDUCATION/TRAINING PROGRAM

## 2023-01-09 PROCEDURE — 32408 CORE NDL BX LNG/MED PERQ: CPT

## 2023-01-09 PROCEDURE — 250N000011 HC RX IP 250 OP 636: Performed by: STUDENT IN AN ORGANIZED HEALTH CARE EDUCATION/TRAINING PROGRAM

## 2023-01-09 PROCEDURE — 88185 FLOWCYTOMETRY/TC ADD-ON: CPT | Performed by: PHYSICIAN ASSISTANT

## 2023-01-09 PROCEDURE — 85610 PROTHROMBIN TIME: CPT | Performed by: NURSE PRACTITIONER

## 2023-01-09 PROCEDURE — 99152 MOD SED SAME PHYS/QHP 5/>YRS: CPT | Mod: GC | Performed by: STUDENT IN AN ORGANIZED HEALTH CARE EDUCATION/TRAINING PROGRAM

## 2023-01-09 PROCEDURE — 88342 IMHCHEM/IMCYTCHM 1ST ANTB: CPT | Mod: 26 | Performed by: STUDENT IN AN ORGANIZED HEALTH CARE EDUCATION/TRAINING PROGRAM

## 2023-01-09 PROCEDURE — 99207 PR NO BILLABLE SERVICE THIS VISIT: CPT | Performed by: STUDENT IN AN ORGANIZED HEALTH CARE EDUCATION/TRAINING PROGRAM

## 2023-01-09 RX ORDER — NALOXONE HYDROCHLORIDE 0.4 MG/ML
0.4 INJECTION, SOLUTION INTRAMUSCULAR; INTRAVENOUS; SUBCUTANEOUS
Status: DISCONTINUED | OUTPATIENT
Start: 2023-01-09 | End: 2023-01-09 | Stop reason: HOSPADM

## 2023-01-09 RX ORDER — NALOXONE HYDROCHLORIDE 0.4 MG/ML
0.2 INJECTION, SOLUTION INTRAMUSCULAR; INTRAVENOUS; SUBCUTANEOUS
Status: DISCONTINUED | OUTPATIENT
Start: 2023-01-09 | End: 2023-01-09 | Stop reason: HOSPADM

## 2023-01-09 RX ORDER — NALOXONE HYDROCHLORIDE 0.4 MG/ML
0.2 INJECTION, SOLUTION INTRAMUSCULAR; INTRAVENOUS; SUBCUTANEOUS
Status: DISCONTINUED | OUTPATIENT
Start: 2023-01-09 | End: 2023-01-10 | Stop reason: HOSPADM

## 2023-01-09 RX ORDER — OXYCODONE HYDROCHLORIDE 5 MG/1
5 TABLET ORAL EVERY 4 HOURS PRN
Status: DISCONTINUED | OUTPATIENT
Start: 2023-01-09 | End: 2023-01-10 | Stop reason: HOSPADM

## 2023-01-09 RX ORDER — NALOXONE HYDROCHLORIDE 0.4 MG/ML
0.4 INJECTION, SOLUTION INTRAMUSCULAR; INTRAVENOUS; SUBCUTANEOUS
Status: DISCONTINUED | OUTPATIENT
Start: 2023-01-09 | End: 2023-01-10 | Stop reason: HOSPADM

## 2023-01-09 RX ORDER — ACETAMINOPHEN 325 MG/1
650-975 TABLET ORAL EVERY 6 HOURS PRN
Status: DISCONTINUED | OUTPATIENT
Start: 2023-01-09 | End: 2023-01-10 | Stop reason: HOSPADM

## 2023-01-09 RX ORDER — FLUMAZENIL 0.1 MG/ML
0.2 INJECTION, SOLUTION INTRAVENOUS
Status: DISCONTINUED | OUTPATIENT
Start: 2023-01-09 | End: 2023-01-09 | Stop reason: HOSPADM

## 2023-01-09 RX ORDER — FENTANYL CITRATE 50 UG/ML
25-50 INJECTION, SOLUTION INTRAMUSCULAR; INTRAVENOUS EVERY 5 MIN PRN
Status: DISCONTINUED | OUTPATIENT
Start: 2023-01-09 | End: 2023-01-09 | Stop reason: HOSPADM

## 2023-01-09 RX ADMIN — FENTANYL CITRATE 25 MCG: 50 INJECTION, SOLUTION INTRAMUSCULAR; INTRAVENOUS at 17:32

## 2023-01-09 RX ADMIN — FENTANYL CITRATE 50 MCG: 50 INJECTION, SOLUTION INTRAMUSCULAR; INTRAVENOUS at 17:21

## 2023-01-09 RX ADMIN — LIDOCAINE HYDROCHLORIDE 10 ML: 10 INJECTION, SOLUTION EPIDURAL; INFILTRATION; INTRACAUDAL; PERINEURAL at 17:43

## 2023-01-09 RX ADMIN — FENTANYL CITRATE 25 MCG: 50 INJECTION, SOLUTION INTRAMUSCULAR; INTRAVENOUS at 17:34

## 2023-01-09 RX ADMIN — FENTANYL CITRATE 50 MCG: 50 INJECTION, SOLUTION INTRAMUSCULAR; INTRAVENOUS at 17:27

## 2023-01-09 RX ADMIN — OXYCODONE HYDROCHLORIDE 5 MG: 5 TABLET ORAL at 19:23

## 2023-01-09 RX ADMIN — ACETAMINOPHEN 975 MG: 325 TABLET, FILM COATED ORAL at 18:29

## 2023-01-09 RX ADMIN — Medication 1 MG: at 21:47

## 2023-01-09 RX ADMIN — ACETAMINOPHEN 650 MG: 325 TABLET, FILM COATED ORAL at 10:46

## 2023-01-09 RX ADMIN — MIDAZOLAM HYDROCHLORIDE 1 MG: 1 INJECTION, SOLUTION INTRAMUSCULAR; INTRAVENOUS at 17:21

## 2023-01-09 RX ADMIN — FENTANYL CITRATE 50 MCG: 50 INJECTION, SOLUTION INTRAMUSCULAR; INTRAVENOUS at 17:17

## 2023-01-09 RX ADMIN — MIDAZOLAM HYDROCHLORIDE 1 MG: 1 INJECTION, SOLUTION INTRAMUSCULAR; INTRAVENOUS at 17:26

## 2023-01-09 RX ADMIN — MIDAZOLAM HYDROCHLORIDE 0.5 MG: 1 INJECTION, SOLUTION INTRAMUSCULAR; INTRAVENOUS at 17:34

## 2023-01-09 RX ADMIN — MIDAZOLAM HYDROCHLORIDE 1 MG: 1 INJECTION, SOLUTION INTRAMUSCULAR; INTRAVENOUS at 17:18

## 2023-01-09 RX ADMIN — MIDAZOLAM HYDROCHLORIDE 0.5 MG: 1 INJECTION, SOLUTION INTRAMUSCULAR; INTRAVENOUS at 17:32

## 2023-01-09 ASSESSMENT — ACTIVITIES OF DAILY LIVING (ADL)
ADLS_ACUITY_SCORE: 20

## 2023-01-09 NOTE — PLAN OF CARE
Goal Outcome Evaluation:    RN assumed cares [id number]    Pt A&Ox4, VSS on RA. Pt reported pain at a level 3/10 and received PRN Tylenol at 2234. R PIV SL and flushing well. Pt NPO since 0000 for possible biopsy today. Pt resting between cares. Will continue to monitor and follow the plan of care.            Overall Patient Progress: no change Overall Patient Progress: no change

## 2023-01-09 NOTE — PLAN OF CARE
RN assumed cares: [id number]     Vitals: VSS on RA   Neuro: AOx4  Pain: Negligible chest pain today, tylenol given x1 for neck pain/headache   Cardiovascular: WDL   Respiratory: WDL  GI/: NPO. Uses bathroom ind, denies B/B concerns   Musculoskeletal: Ind   Skin: WDL  Lines/Drains: R PIV SL      Events & Plan: Pt resting in bed visiting w/ family majority of shift. Biopsy in IR this afternoon, NPO since midnight, consent signed & in chart. Possible discharge after, later this evening. Call light within reach, able to make needs known. Will continue to monitor & follow POC    Goal Outcome Evaluation:    Plan of Care Reviewed With: patient    Overall Patient Progress: no change    Outcome Evaluation: Negligible chest pain, tylenol given x1 for headache/ neck soreness. Plan for biopsy in IR this afternoon, consent signed. Possible discharge after

## 2023-01-09 NOTE — PROGRESS NOTES
IR follow-up note.    IR will proceed with CT guided anterior mediastinal mass biopsy 1/9. Pt is NPO. Labs pending. Dx labs should be entered into the chart prior to IR biopsy.    Discussed with Dr. Rodriguez from IR. Please see full consult note from 1/7 for more details.    Rasheeda Claire DNP, APRN  Interventional Radiology   IR on-call pager: [phone]

## 2023-01-09 NOTE — PROGRESS NOTES
Brief Oncology Note    Chart reviewed, planning for biopsy of the mediastinal mass today.  Per nursing notes, pain is fairly controlled with PRN tylenol. Also reviewed his imaging, no distant metastases identified on CT or MRI brain. Uric acid and phos WNL.    - Orders placed for surg path and flow  - OK to discharge from an Oncology standpoint after biopsy.   - Referral to Oncology/Hematology rapid access clinic sent previously, will continue to follow pathology findings.     Will sign off, please page if questions arise.     Anaamria Dong PA-C  Hematology/Oncology  Pager #[phone] or available on [facility]

## 2023-01-09 NOTE — PROGRESS NOTES
"THORACIC SURGERY PROGRESS NOTE     S: No acute events overnight.      O: /48 (BP Location: Left arm)   Pulse 62   Temp 98.3  F (36.8  C) (Oral)   Resp 16   Ht 1.753 m (5' 9\")   Wt 95.2 kg (209 lb 12.8 oz)   SpO2 99%   BMI 30.98 kg/m      GENERAL: NAD, resting comfortably in bed  HEENT: atraumatic, normocephalic, no scleral icterus  CARDIO: normal rate and regular rhythm, no LE edema  PULM: no increased WOB, CTAB  ABD: non-distended, soft, and non-tender  NEURO: CN II-XII grossly intact, no focal neuro deficits  PSYCH: appropriate affect and behavior     A/P: 27M who presented with chest pain 1/6/23 found to have an anterior mediastinal mass.     - IR to bx anterior mediastinal mass on 1/09  - we will continue to follow     Seen with thoracic fellow, will discuss with staff.    Alexa Rooney,   General Surgery, PGY-1  [phone]        "

## 2023-01-09 NOTE — PROCEDURES
[facility]    Procedure: IR CT biopsy    Date/Time: 1/9/2023 5:53 PM  Performed by: Hans Hinkle MD  Authorized by: Hans Hinkle MD       UNIVERSAL PROTOCOL   Site Marked: Yes  Prior Images Obtained and Reviewed:  Yes  Required items: Required blood products, implants, devices and special equipment available    Patient identity confirmed:  Verbally with patient, arm band, provided demographic data and hospital-assigned identification number  Patient was reevaluated immediately before administering moderate or deep sedation or anesthesia  Confirmation Checklist:  Patient's identity using two indicators, relevant allergies, procedure was appropriate and matched the consent or emergent situation and correct equipment/implants were available  Time out: Immediately prior to the procedure a time out was called    Universal Protocol: the Joint Commission Universal Protocol was followed    Preparation: Patient was prepped and draped in usual sterile fashion       ANESTHESIA    Anesthesia: Local infiltration  Local Anesthetic:  Lidocaine 1% without epinephrine  Anesthetic Total (mL):  10      SEDATION  Patient Sedated: Yes    Sedation Type:  Moderate (conscious) sedation  Sedation:  Fentanyl and midazolam  Vital signs: Vital signs monitored during sedation    See dictated procedure note for full details.  Findings: Five samples were taken from anterior mediastinal mass and submitted to pathology.    Specimens: none    Complications: None    Condition: Stable      PROCEDURE    Patient Tolerance:  Patient tolerated the procedure well with no immediate complications  Length of time physician/provider present for 1:1 monitoring during sedation: 25

## 2023-01-09 NOTE — PROGRESS NOTES
"[facility]    Medicine Progress Note - Hospitalist Service, GOLD TEAM 11    Date of Admission:  1/6/2023    Assessment & Plan   27 year old male without significant past medical history. He presented to [facility] this morning (1/6) with reports of chest pain. CT-PE study thus obtained and showed a large 9.7 x 6.8 x 6.5 cm anterior mediastinal mass. Transfer to the [facility] for further evaluation and biopsy with thoracic surgery and also oncology consult.      # Large Anterior Mediastinal Mass  #  Mass effect upon the heart and superior vena cava with leftward displacement  Relatively healthy young male with 4 days of R sided chest pain. CT-PE study at OSH showed a large 9.7 x 6.8 x 6.5 cm mediastinal mass. No active B-cell symptoms apart from low grade fever 2 days PTA.  LDH elevated at 663. AFP tumor marker normal. BHCG neg. No facial flushing or edema concerning for SVC syndrome. Differential includes lymphoma, thymoma, or pericardial cyst. Work up including  CT neck and C/A/P with contrast and MRI brain with and without contrast. Scrotal U/S neg. UA neg. TTE neg    Plan:  - Thoracic following  - Oncology following.   - IR consult. Bx 01/09        # Obesity: Estimated body mass index is 31.09 kg/m  as calculated from the following:    Height as of this encounter: 1.753 m (5' 9\").    Weight as of this encounter: 95.5 kg (210 lb 8 oz).         Diet: NPO per Anesthesia Guidelines for Procedure/Surgery Except for: Meds    DVT Prophylaxis: Low Risk/Ambulatory with no VTE prophylaxis indicated  Bryant Catheter: Not present  Lines: None     Cardiac Monitoring: None  Code Status: Full Code      Clinically Significant Risk Factors                                Disposition Plan     Expected Discharge Date: 01/09/2023        Discharge Comments: Dispo: Likely home after bx on 1/9          ABRAHAN SUNSHINE MD  Hospitalist Service, GOLD TEAM 11  [facility]" [facility]  Securely message with [email or web address] (more info)  Text page via [email or web address] Paging/Directory   See signed in provider for up to date coverage information  ______________________________________________________________________    Interval History   No acute events overnight. NPO after midnight for Bx today after discussion in AM.     Physical Exam   Vital Signs: Temp: 98.3  F (36.8  C) Temp src: Oral BP: 136/48 Pulse: 62   Resp: 16 SpO2: 99 % O2 Device: None (Room air)    Weight: 209 lbs 12.8 oz    Constitutional: Lying in bed with no acute distress   Respiratory: Breathing comfortably on RA    Medical Decision Making       45 MINUTES SPENT BY ME on the date of service doing chart review, history, exam, documentation & further activities per the note.      Data

## 2023-01-09 NOTE — PROGRESS NOTES
Patient Name: Guero Collins  Medical Record Number: [id number]  Today's Date: 1/9/2023    Procedure: CT guided mediastinal mass biopsy  Proceduralist: Dr Ayaan MENDOZA, Dr Manan MENDOZA  Pathology present: No    Procedure Start: 1723  Procedure end: 1748  Sedation medications administered: Midazolam 4 mg, Fentanyl 200 mcg     Report given to: Yadiel HENDERSON RN  : N/A    Other Notes: Pt arrived to IR room CT2 from . Consent reviewed. Pt denies any questions or concerns regarding procedure. Pt positioned supine and monitored per protocol. 5 cores obtained and sent to lab as ordered.  Hemastatis achieved.  Patient to be on bedrest for 1 hours.  Pt tolerated procedure without any noted complications. Pt transferred back to .

## 2023-01-09 NOTE — PRE-PROCEDURE
GENERAL PRE-PROCEDURE:   Procedure:  IR biopsy    Written consent obtained?: Yes    Risks and benefits: Risks, benefits and alternatives were discussed    Consent given by:  Patient  Patient states understanding of procedure being performed: Yes    Patient's understanding of procedure matches consent: Yes    Procedure consent matches procedure scheduled: Yes    Expected level of sedation:  Moderate  Appropriately NPO:  Yes  ASA Class:  2  Mallampati  :  Grade 2- soft palate, base of uvula, tonsillar pillars, and portion of posterior pharyngeal wall visible  Lungs:  Lungs clear with good breath sounds bilaterally  Heart:  Normal heart sounds and rate  History & Physical reviewed:  History and physical reviewed and no updates needed  Statement of review:  I have reviewed the lab findings, diagnostic data, medications, and the plan for sedation

## 2023-01-10 ENCOUNTER — DOCUMENTATION ONLY (OUTPATIENT)
Dept: ONCOLOGY | Facility: CLINIC | Age: 28
End: 2023-01-10

## 2023-01-10 ENCOUNTER — APPOINTMENT (OUTPATIENT)
Dept: GENERAL RADIOLOGY | Facility: CLINIC | Age: 28
DRG: 167 | End: 2023-01-10
Attending: INTERNAL MEDICINE

## 2023-01-10 VITALS
TEMPERATURE: 97.7 F | RESPIRATION RATE: 18 BRPM | SYSTOLIC BLOOD PRESSURE: 122 MMHG | HEIGHT: 69 IN | OXYGEN SATURATION: 96 % | HEART RATE: 60 BPM | BODY MASS INDEX: 31.07 KG/M2 | WEIGHT: 209.8 LBS | DIASTOLIC BLOOD PRESSURE: 63 MMHG

## 2023-01-10 DIAGNOSIS — J98.59 MEDIASTINAL MASS: Primary | ICD-10-CM

## 2023-01-10 LAB
ALBUMIN SERPL BCG-MCNC: 4.4 G/DL (ref 3.5–5.2)
ALP SERPL-CCNC: 57 U/L (ref 40–129)
ALT SERPL W P-5'-P-CCNC: 13 U/L (ref 10–50)
ANION GAP SERPL CALCULATED.3IONS-SCNC: 15 MMOL/L (ref 7–15)
AST SERPL W P-5'-P-CCNC: 13 U/L (ref 10–50)
BILIRUB SERPL-MCNC: 0.3 MG/DL
BUN SERPL-MCNC: 12.7 MG/DL (ref 6–20)
CALCIUM SERPL-MCNC: 9.8 MG/DL (ref 8.6–10)
CHLORIDE SERPL-SCNC: 101 MMOL/L (ref 98–107)
CREAT SERPL-MCNC: 0.76 MG/DL (ref 0.67–1.17)
DEPRECATED HCO3 PLAS-SCNC: 22 MMOL/L (ref 22–29)
ERYTHROCYTE [DISTWIDTH] IN BLOOD BY AUTOMATED COUNT: 13.2 % (ref 10–15)
GFR SERPL CREATININE-BSD FRML MDRD: >90 ML/MIN/1.73M2
GLUCOSE SERPL-MCNC: 124 MG/DL (ref 70–99)
HCT VFR BLD AUTO: 42.8 % (ref 40–53)
HGB BLD-MCNC: 13.7 G/DL (ref 13.3–17.7)
MCH RBC QN AUTO: 29.4 PG (ref 26.5–33)
MCHC RBC AUTO-ENTMCNC: 32 G/DL (ref 31.5–36.5)
MCV RBC AUTO: 92 FL (ref 78–100)
PLATELET # BLD AUTO: 399 10E3/UL (ref 150–450)
POTASSIUM SERPL-SCNC: 4.5 MMOL/L (ref 3.4–5.3)
PROT SERPL-MCNC: 7.6 G/DL (ref 6.4–8.3)
RBC # BLD AUTO: 4.66 10E6/UL (ref 4.4–5.9)
SODIUM SERPL-SCNC: 138 MMOL/L (ref 136–145)
WBC # BLD AUTO: 15.2 10E3/UL (ref 4–11)

## 2023-01-10 PROCEDURE — 71046 X-RAY EXAM CHEST 2 VIEWS: CPT | Mod: 26 | Performed by: RADIOLOGY

## 2023-01-10 PROCEDURE — 85014 HEMATOCRIT: CPT | Performed by: STUDENT IN AN ORGANIZED HEALTH CARE EDUCATION/TRAINING PROGRAM

## 2023-01-10 PROCEDURE — 250N000013 HC RX MED GY IP 250 OP 250 PS 637: Performed by: STUDENT IN AN ORGANIZED HEALTH CARE EDUCATION/TRAINING PROGRAM

## 2023-01-10 PROCEDURE — 250N000011 HC RX IP 250 OP 636: Performed by: STUDENT IN AN ORGANIZED HEALTH CARE EDUCATION/TRAINING PROGRAM

## 2023-01-10 PROCEDURE — 80053 COMPREHEN METABOLIC PANEL: CPT | Performed by: STUDENT IN AN ORGANIZED HEALTH CARE EDUCATION/TRAINING PROGRAM

## 2023-01-10 PROCEDURE — 36415 COLL VENOUS BLD VENIPUNCTURE: CPT | Performed by: STUDENT IN AN ORGANIZED HEALTH CARE EDUCATION/TRAINING PROGRAM

## 2023-01-10 PROCEDURE — 71046 X-RAY EXAM CHEST 2 VIEWS: CPT

## 2023-01-10 PROCEDURE — 99239 HOSP IP/OBS DSCHRG MGMT >30: CPT | Performed by: INTERNAL MEDICINE

## 2023-01-10 PROCEDURE — 250N000013 HC RX MED GY IP 250 OP 250 PS 637: Performed by: PEDIATRICS

## 2023-01-10 RX ORDER — ACETAMINOPHEN 325 MG/1
650-975 TABLET ORAL EVERY 6 HOURS PRN
Status: ON HOLD
Start: 2023-01-10 | End: 2023-02-17

## 2023-01-10 RX ADMIN — OXYCODONE HYDROCHLORIDE 5 MG: 5 TABLET ORAL at 01:25

## 2023-01-10 RX ADMIN — ACETAMINOPHEN 975 MG: 325 TABLET, FILM COATED ORAL at 01:29

## 2023-01-10 RX ADMIN — ONDANSETRON 4 MG: 4 TABLET, ORALLY DISINTEGRATING ORAL at 02:28

## 2023-01-10 ASSESSMENT — ACTIVITIES OF DAILY LIVING (ADL)
ADLS_ACUITY_SCORE: 20

## 2023-01-10 NOTE — PLAN OF CARE
Patient alert and oriented x4, ambulatory, independent with ADls, complained of slight manageable chest pain. Transported to IR for biopsy of Mediastinal mass and came back to unit in stable condition but complained of intense pain on needle insertion site. Requested for a stronger pain med other than tylenol. MD notified. Biopsy Insertion site marked, stable, covered with transparent dressing. Agreed to take tylenol while waiting for MD order. Oxycodone was administered thereafter which offered optimal relief. Eating regular diet with good appetite. Resting in bed as of this time.  P: Monitor for s/sx of bleeding and other untoward complication of biopsy. Possible discharge tomorrow.

## 2023-01-10 NOTE — PROGRESS NOTES
BRIEF THORACIC SURGERY PROGRESS NOTE     Patient not seen at bedside this morning.      A/P: 27M who presented with chest pain 1/6/23 found to have an anterior mediastinal mass s/p IR bx on 1/9.     Patient w/o sxs consistent with SVC syndrome, and no acute surgical intervention for mediastinal mass during this hospitalization. Will have pt follow up with thoracic surgery as an outpatient. We will sign off at this time.     Seen with thoracic fellow, will discuss with staff.    Alexa Rooney,   General Surgery, PGY-1  [phone]

## 2023-01-10 NOTE — PROGRESS NOTES
Referral to Oncology Rapid Access Clinic reviewed. Dr Abraham notifed via staff message, path results pending from IR bx 1/9/23, pt discharging from [facility] today.

## 2023-01-10 NOTE — DISCHARGE SUMMARY
[facility]  Hospitalist Discharge Summary      Date of Admission:  1/6/2023  Date of Discharge:  1/10/2023  Discharging Provider: Eloisa Alvarez MD  Discharge Service: Hospitalist Service, GOLD TEAM 11    Discharge Diagnoses   Anterior Mediastinal Mass    Follow-ups Needed After Discharge   Follow-up Appointments     Adult [facility] Follow-up and recommended labs and tests      - You were discovered to have an anterior mediastinal mass that we   biopsied. Pending results. You will need to follow up with oncology as   instructed to follow up on results and next steps. Also referral with   thoracic outpt was made to follow up with them after results to check any   surgical option    Appointments on [place] and/or [facility] (with [facility] or [facility]   provider or service). Call [phone] if you haven't heard regarding   these appointments within 7 days of discharge.             Unresulted Labs Ordered in the Past 30 Days of this Admission     Date and Time Order Name Status Description    1/10/2023 12:02 AM Comprehensive metabolic panel In process     1/9/2023  7:54 AM Surgical pathology exam - Soft Tissue In process     1/8/2023  8:44 PM Flow Cytometry Tissue In process       These results will be followed up by your outpatient oncology team    Discharge Disposition   Discharged to home  Condition at discharge: Stable    Hospital Course   27 year old male without significant past medical history. He presented to [facility] this morning (1/6) with reports of chest pain. CT-PE study thus obtained and showed a large 9.7 x 6.8 x 6.5 cm anterior mediastinal mass. Transfer to the [facility] for further evaluation and biopsy with thoracic surgery and also oncology consult.      # Large Anterior Mediastinal Mass  #  Mass effect upon the heart and superior vena cava with leftward displacement  Relatively healthy young male with 4 days of R sided chest pain. CT-PE study at OSH  "showed a large 9.7 x 6.8 x 6.5 cm mediastinal mass. No active B-cell symptoms apart from low grade fever 2 days PTA.  LDH elevated at 663. AFP tumor marker normal. BHCG neg. No facial flushing or edema concerning for SVC syndrome. Differential includes lymphoma, thymoma, or pericardial cyst. Work up including  CT neck and C/A/P with contrast and MRI brain with and without contrast. Scrotal U/S neg. UA neg. TTE neg  - Thoracic consulted, oncology consulted  - s/p IR Mediastinal Mass bx 1/9, results pending at time of discharge  - Discharge home w/ \"Rapid Access\" Oncology follow up and referral to thoracic surgery outpatient to eval for possible surgical options.     # Obesity: Estimated body mass index is 31.09 kg/m  as calculated from the following:    Height as of this encounter: 1.753 m (5' 9\").    Weight as of this encounter: 95.5 kg (210 lb 8 oz).        Consultations This Hospital Stay   THORACIC SURGERY ADULT IP CONSULT  ONCOLOGY ADULT IP CONSULT  INTERVENTIONAL RADIOLOGY ADULT/PEDS IP CONSULT  INTERVENTIONAL RADIOLOGY ADULT/PEDS IP CONSULT  CARE MANAGEMENT / SOCIAL WORK IP CONSULT    Code Status   Full Code    Time Spent on this Encounter   I, Eloisa Alvarez MD, personally saw the patient today and spent greater than 30 minutes discharging this patient.       Eloisa Alvarez MD  [facility] [address] [place] [id number]  Phone: [phone]  ______________________________________________________________________    Physical Exam   Vital Signs: Temp: 97.7  F (36.5  C) Temp src: Oral BP: 122/63 Pulse: 60   Resp: 18 SpO2: 96 % O2 Device: None (Room air)    Weight: 209 lbs 12.8 oz    General: alert & oriented, MMM, PERRLA, EOMI w/o scleral icterus, neck FROM, pleasant  Resp: CTAB w/o focal findings, no wheezes/crackles, no increased WOB, on RA  Cards: RRR w/o murmurs/rubs/gallops, no JVD, no LE edema  Skin: warm and well perfused  Neuro/MSK: moving all 4 extremities equally         Primary " Care Physician   Dr. Agustina Jacobs- [facility]    Discharge Orders      Adult Oncology/Hematology  Referral      Thoracic Surgery  Referral      Reason for your hospital stay    Anterior mediastinal mass with biopsy     Activity    Your activity upon discharge: As tolerated     Adult [facility] Follow-up and recommended labs and tests    - You were discovered to have an anterior mediastinal mass that we biopsied. Pending results. You will need to follow up with oncology as instructed to follow up on results and next steps. Also referral with thoracic outpt was made to follow up with them after results to check any surgical option    Appointments on [place] and/or [facility] (with [facility] or [facility] provider or service). Call [phone] if you haven't heard regarding these appointments within 7 days of discharge.     Diet    Follow this diet upon discharge: Regular       Significant Results and Procedures   IR Anterior Mediastinal Mass bx 1/9/23, surgical pathology pending at time of discharge    Imaging:   MRI brain w/ and w/o contrast (1/7/23):   1. No enhancing intracranial lesion as questioned.  2. No acute intracranial pathology is suspected.    CT Chest/Abd/Pelvis (1/7/23):  1. Redemonstration of large anterior mediastinal mass measuring up to  7.3 cm with displacement of adjacent structures suspicious for  malignancy. Differential includes lymphoma, thymoma, germ cell tumor.  With a single mildly prominent right hilar lymph node.    2. Small associated pleural effusion.   4. No evidence of distant metastatic disease within the chest abdomen  or pelvis.  5. Slight density within the right lateral aspect of the bladder may  represent hemorrhage or debris, suggest correlation with urinalysis.    CT soft tissue/Neck (1/7/23):   Normal CT study of the neck with contrast.  No evident  mass or adenopathy within the neck.    US testicle (1/7/23):  Normal testicular ultrasound.    Echo  (1/8/23)  Global and regional left ventricular function is normal with an EF of 60-65%.  Global right ventricular function is normal. The right ventricle is normal  size.  No significant valvular abnormalities.  No pericardial effusion is present.  There is no prior study for direct comparison.           Discharge Medications   Current Discharge Medication List      START taking these medications    Details   acetaminophen (TYLENOL) 325 MG tablet Take 2-3 tablets (650-975 mg) by mouth every 6 hours as needed for mild pain or fever    Associated Diagnoses: Mediastinal mass         CONTINUE these medications which have NOT CHANGED    Details   multivitamin w/minerals (MULTI-VITAMIN) tablet Take 1 tablet by mouth daily           Allergies   No Known Allergies

## 2023-01-10 NOTE — PLAN OF CARE
RN assumed cares: [id number]     Vitals: VSS on RA   Neuro: AOx4  Pain: tylenol and oxy given x1 for pain at biopsy insertion site.  Cardiovascular: WDL   Respiratory: WDL  GI/:Regular  Uses bathroom ind, denies B/B concerns   Musculoskeletal: Ind   Skin: WDL  Lines/Drains: R PIV SL      Events & Plan: Remind pt to take pain meds with food. Pt reported pain at the insertion site with relief from oxy and tylenol. Pt up overnight due to pain.     Goal Outcome Evaluation:      Plan of Care Reviewed With: patient    Overall Patient Progress: no changeOverall Patient Progress: no change    Outcome Evaluation: Pain at the biopsy insertion site continued overnight - gave oxy and tylenol 1x overnight. Potential discharge 1/10.

## 2023-01-10 NOTE — PLAN OF CARE
Discharged to: home  Transportation: ride  Time: 1347  Prescriptions: none  Belongings: all packed and sent with pt  PIV/Access: deaccessed  Care Plan and Education discontinued: yes  Paperwork: discussed with and given to pt    AxOx4. VSS on RA. Reported right chest and incisional pain today, aching at incision site and pressure in lower lateral chest -- declined medication -- cold packs given. On regular diet, tolerated well. Continued nausea from overnight med administration -- declined zofran, eating slowly and drinking sips of clear liquids. Up ad yoko in room. Voiding adequately and spontaneously. No BM. Chest xray this morning. IS provided and instruction given to pt for use at home to improve atelectasis. No acute events prior to discharge.

## 2023-01-11 ENCOUNTER — PATIENT OUTREACH (OUTPATIENT)
Dept: ONCOLOGY | Facility: CLINIC | Age: 28
End: 2023-01-11

## 2023-01-11 LAB
PATH REPORT.COMMENTS IMP SPEC: NORMAL
PATH REPORT.FINAL DX SPEC: NORMAL
PATH REPORT.MICROSCOPIC SPEC OTHER STN: NORMAL
PATH REPORT.RELEVANT HX SPEC: NORMAL

## 2023-01-11 NOTE — PROGRESS NOTES
RECORDS STATUS - ALL OTHER DIAGNOSIS    Mediastinal mass   RECORDS RECEIVED FROM: [facility]   DATE RECEIVED: 1/16/2023   NOTES STATUS DETAILS   OFFICE NOTE from referring provider     DISCHARGE SUMMARY from hospital Complete 1/6/2023   Mediastinal mass        DISCHARGE REPORT from the ER     OPERATIVE REPORT Complete IR CT Biopsy 1/6/2023   MEDICATION LIST Complete [facility]   CLINICAL TRIAL TREATMENTS TO DATE     LABS     PATHOLOGY REPORTS In Process  1/9/2023  Surgical pathology exam - Soft Tissue        ANYTHING RELATED TO DIAGNOSIS Complete Labs last updated on 1/10/2022   GENONOMIC TESTING     TYPE:     IMAGING (NEED IMAGES & REPORT)     CT SCANS Complete CT Chest Soft tissue biopsy 1/9/2023    CT chest Abdomen Pelvis 1/7/2023    CT Soft Tissue Neck 1/7/2023    CT Chest 1/6/2023   Xray Chest Complete Xray Chest 1/10/2023   MRI Complete MRI Brain 1/7/2023   MAMMO     ULTRASOUND     PET

## 2023-01-11 NOTE — PROGRESS NOTES
OUTGOING CALL: Notified Guero that NN will help coordinate outpatient oncology appt, path results from IR bx of mediastinal mass still pending, pt discharged to home and knows to expect a call in the next day re: next steps.   Pt voiced understanding of above instructions and information and denied further questions, was given [facility] phone number and my name and role as New patient NN for interval questions.    Thao Henderson, RN, BSN, OCN  Hematology/Oncology Nurse Navigator  [facility] [place]  [phone] / [phone]

## 2023-01-12 ENCOUNTER — HOSPITAL ENCOUNTER (OUTPATIENT)
Dept: RESPIRATORY THERAPY | Facility: CLINIC | Age: 28
Discharge: HOME | End: 2023-01-12
Attending: CLINICAL NURSE SPECIALIST | Admitting: CLINICAL NURSE SPECIALIST

## 2023-01-12 ENCOUNTER — PATIENT OUTREACH (OUTPATIENT)
Dept: ONCOLOGY | Facility: CLINIC | Age: 28
End: 2023-01-12

## 2023-01-12 DIAGNOSIS — J98.59 MEDIASTINAL MASS: Primary | ICD-10-CM

## 2023-01-12 DIAGNOSIS — J98.59 MEDIASTINAL MASS: ICD-10-CM

## 2023-01-12 LAB
DLCOCOR-%PRED-PRE: 78 %
DLCOCOR-PRE: 25.28 ML/MIN/MMHG
DLCOUNC-%PRED-PRE: 76 %
DLCOUNC-PRE: 24.61 ML/MIN/MMHG
DLCOUNC-PRED: 32.04 ML/MIN/MMHG
ERV-%PRED-PRE: 39 %
ERV-PRE: 0.57 L
ERV-PRED: 1.44 L
EXPTIME-PRE: 5.24 SEC
FEF2575-%PRED-POST: 76 %
FEF2575-%PRED-PRE: 42 %
FEF2575-POST: 3.64 L/SEC
FEF2575-PRE: 1.99 L/SEC
FEF2575-PRED: 4.74 L/SEC
FEFMAX-%PRED-PRE: 40 %
FEFMAX-PRE: 4.05 L/SEC
FEFMAX-PRED: 9.97 L/SEC
FEV1-%PRED-PRE: 48 %
FEV1-PRE: 2.18 L
FEV1FEV6-PRE: 80 %
FEV1FEV6-PRED: 84 %
FEV1FVC-PRE: 81 %
FEV1FVC-PRED: 84 %
FEV1SVC-PRE: 86 %
FEV1SVC-PRED: 80 %
FIFMAX-PRE: 2.6 L/SEC
FRCPLETH-%PRED-PRE: 64 %
FRCPLETH-PRE: 2.07 L
FRCPLETH-PRED: 3.24 L
FVC-%PRED-PRE: 50 %
FVC-PRE: 2.68 L
FVC-PRED: 5.35 L
IC-%PRED-PRE: 47 %
IC-PRE: 1.97 L
IC-PRED: 4.18 L
RVPLETH-%PRED-PRE: 92 %
RVPLETH-PRE: 1.5 L
RVPLETH-PRED: 1.62 L
TLCPLETH-%PRED-PRE: 58 %
TLCPLETH-PRE: 4.04 L
TLCPLETH-PRED: 6.92 L
VA-%PRED-PRE: 64 %
VA-PRE: 4.09 L
VC-%PRED-PRE: 45 %
VC-PRE: 2.54 L
VC-PRED: 5.62 L

## 2023-01-12 PROCEDURE — 94729 DIFFUSING CAPACITY: CPT

## 2023-01-12 PROCEDURE — 999N000157 HC STATISTIC RCP TIME EA 10 MIN

## 2023-01-12 PROCEDURE — 94060 EVALUATION OF WHEEZING: CPT

## 2023-01-12 PROCEDURE — 250N000009 HC RX 250: Performed by: CLINICAL NURSE SPECIALIST

## 2023-01-12 PROCEDURE — 94726 PLETHYSMOGRAPHY LUNG VOLUMES: CPT

## 2023-01-12 RX ORDER — ALBUTEROL SULFATE 0.83 MG/ML
2.5 SOLUTION RESPIRATORY (INHALATION) ONCE
Status: COMPLETED | OUTPATIENT
Start: 2023-01-12 | End: 2023-01-12

## 2023-01-12 RX ADMIN — ALBUTEROL SULFATE 2.5 MG: 2.5 SOLUTION RESPIRATORY (INHALATION) at 14:18

## 2023-01-12 NOTE — PROGRESS NOTES
Path still pending paged Dr Abraham to advise re: seeing in RAC vs waiting for path.  Thao Henderson, RN, BSN, OCN  Hematology/Oncology Nurse Navigator  [facility]  [phone] / [phone]

## 2023-01-12 NOTE — PROGRESS NOTES
RESPIRATORY CARE NOTE    Complete Pulmonary Function Test completed by patient.  Good patient effort and cooperation. Albuterol 2.5 mg neb given for bronchodilation.  The results of this test meet the ATS standards for acceptability and repeatability. PT returned to baseline and left in no distress.    Maine Denis, RT  1/12/2023

## 2023-01-12 NOTE — TELEPHONE ENCOUNTER
RNCC left message for pt that he does need to follow-up in clinic tomorrow with Dr. Resendiz since path results are not back yet. Asked pt to call back if he gets this voicemail in time, if not he can still be seen at scheduled time of 2 pm on 1/3.

## 2023-01-13 ENCOUNTER — PATIENT OUTREACH (OUTPATIENT)
Dept: ONCOLOGY | Facility: CLINIC | Age: 28
End: 2023-01-13

## 2023-01-13 LAB
PATH REPORT.COMMENTS IMP SPEC: NORMAL
PATH REPORT.FINAL DX SPEC: NORMAL
PATH REPORT.GROSS SPEC: NORMAL
PATH REPORT.MICROSCOPIC SPEC OTHER STN: NORMAL
PATH REPORT.RELEVANT HX SPEC: NORMAL
PHOTO IMAGE: NORMAL

## 2023-01-13 NOTE — PROGRESS NOTES
OUTGOING CALL: notified Guero that path results are still not resulted, to proceed with thoracic consult on Monday as scheduled and writer is watching for path results in interim to coordinate medical oncology consult with appropriate specialist based on results when avaiable. He denies new sx. Is calm and coping well as he waits for results. Has my direct callback number and the clinic phone number for use as needed and had no further questions for me this am. Appt with medical oncologist at [facility] / Dr Resendiz today was unknown to him and is now cancelled, scheduled in error. Encouraged pt to sign up for "[employer]hart, sent text link via Epic.  Dr Abraham aware of pt, we will use RAC if needed.  Thao Henderson, RN, BSN, OCN  Hematology/Oncology Nurse Navigator  [facility]  [phone] / [phone]

## 2023-01-16 ENCOUNTER — PATIENT OUTREACH (OUTPATIENT)
Dept: ONCOLOGY | Facility: CLINIC | Age: 28
End: 2023-01-16

## 2023-01-16 ENCOUNTER — PRE VISIT (OUTPATIENT)
Dept: ONCOLOGY | Facility: CLINIC | Age: 28
End: 2023-01-16

## 2023-01-16 ENCOUNTER — ONCOLOGY VISIT (OUTPATIENT)
Dept: ONCOLOGY | Facility: CLINIC | Age: 28
End: 2023-01-16
Attending: STUDENT IN AN ORGANIZED HEALTH CARE EDUCATION/TRAINING PROGRAM

## 2023-01-16 VITALS
DIASTOLIC BLOOD PRESSURE: 65 MMHG | OXYGEN SATURATION: 97 % | HEART RATE: 66 BPM | HEIGHT: 69 IN | TEMPERATURE: 97.8 F | WEIGHT: 208 LBS | BODY MASS INDEX: 30.81 KG/M2 | RESPIRATION RATE: 16 BRPM | SYSTOLIC BLOOD PRESSURE: 123 MMHG

## 2023-01-16 DIAGNOSIS — J98.59 MEDIASTINAL MASS: ICD-10-CM

## 2023-01-16 PROCEDURE — 99214 OFFICE O/P EST MOD 30 MIN: CPT | Performed by: THORACIC SURGERY (CARDIOTHORACIC VASCULAR SURGERY)

## 2023-01-16 PROCEDURE — G0463 HOSPITAL OUTPT CLINIC VISIT: HCPCS | Performed by: THORACIC SURGERY (CARDIOTHORACIC VASCULAR SURGERY)

## 2023-01-16 ASSESSMENT — PAIN SCALES - GENERAL: PAINLEVEL: NO PAIN (0)

## 2023-01-16 NOTE — PROGRESS NOTES
"THORACIC SURGERY - NEW PATIENT OFFICE VISIT          I saw Guero Collins in consultation for the evaluation and treatment of an anterior mediastinal mass.     HPI  Guero Collins is a 27 year old male who was transferred to [facility] after workup of right sided chest pain showed a large anterior mediastinal mass. He had chest pain for 4 days and no facial flushing or swelling. He had no fevers or fatigue.   Today, he is feeling fine and has no new symptoms.    Previsit Tests   CT-guided biopsy 1/9/2023:  Scant necrotic tissue with clusters of lymphoid and fibrous tissue - nondiagnostic      MR Brain 1/7/2023:   Normal    Testicular ultrasound 1/7/2023:  Normal    CT chest 1/6/2023:    9.7 x 6.8 x 6.5 cm    Labs 1/6/2023:  Beta-HCG:  Normal  AFP:  Normal  LDH: 663    PMH  Reviewed, as below    Past Medical History:   Diagnosis Date     Anxiety      Pneumonia of right lower lobe due to infectious organism         PSH  Reviewed, as below    None     ETOH:  Occasional  TOB:  Ages [age] 1/2 pack per day, then vaping for 2-3 years. Quit about 2 years ago.    Daily marijuana use ages 17 - 27. Quit 6 months    Physical examination  /65   Pulse 66   Temp 97.8  F (36.6  C) (Tympanic)   Resp 16   Ht 1.74 m (5' 8.5\")   Wt 94.3 kg (208 lb)   SpO2 97%   BMI 31.17 kg/m     Physical Exam  Eyes:      Conjunctiva/sclera: Conjunctivae normal.   Pulmonary:      Effort: Pulmonary effort is normal.   Neurological:      Mental Status: He is alert and oriented to person, place, and time.   Psychiatric:         Mood and Affect: Mood normal.         Behavior: Behavior normal.         Thought Content: Thought content normal.         Judgment: Judgment normal.          From a personal perspective, he is self-employed. He lives with his fiancee, who is with him today. They are getting  in 3 weeks. 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     IMPRESSION (J98.59) Mediastinal mass     This " person is a 27 year old male with an anterior mediastinal mass, with lymphoma or thymoma as the most likely diagnoses, though things like thymic carcinoma or germ cell tumor remain in the differential.      PLAN  I spent 30 min on the date of the encounter in chart review, patient visit, review of tests, documentation and/or discussion with other providers about the issues documented above. I reviewed the plan as follows:    Procedure planned: 1. Repeat Core needle biopsy pending tumor board discussion. The other option is excisional biopsy, which would involve thymectomy, likely via a minimally invasive approach.    I discussed the risks and benefits of the procedure.  The risks include bleeding requiring transfusion and/or sternotomy, injury to the phrenic nerve, need for diaphragm plication, injury to intraabdominal organs, prolonged pain, prolonged intubation and death.  The benefits could include improved control of the myasthenia, though this is not guaranteed.        Necessary Preop Tests & Appointments: Preoperative assessment clinic and Pulmonary function testing, if we were to proceed with surgery.    Regional Anesthesia Plan: Intraoperative intercostal nerve block    Anticoagulation Plan: Prophylactic Lovenox      I appreciate the opportunity to participate in the care of your patient and will keep you updated.    Sincerely,    Alexander Campoverde MD

## 2023-01-16 NOTE — LETTER
"    1/16/2023         RE: Guero Collins  [address] [address]        Dear Colleague,    Thank you for referring your patient, Guero Collins, to the [facility]. Please see a copy of my visit note below.    THORACIC SURGERY - NEW PATIENT OFFICE VISIT          I saw Guero Collins in consultation for the evaluation and treatment of an anterior mediastinal mass.     HPI  Guero Collins is a 27 year old male who was transferred to [facility] after workup of right sided chest pain showed a large anterior mediastinal mass. He had chest pain for 4 days and no facial flushing or swelling. He had no fevers or fatigue.   Today, he is feeling fine and has no new symptoms.    Previsit Tests   CT-guided biopsy 1/9/2023:  Scant necrotic tissue with clusters of lymphoid and fibrous tissue - nondiagnostic      MR Brain 1/7/2023:   Normal    Testicular ultrasound 1/7/2023:  Normal    CT chest 1/6/2023:    9.7 x 6.8 x 6.5 cm    Labs 1/6/2023:  Beta-HCG:  Normal  AFP:  Normal  LDH: 663    PMH  Reviewed, as below    Past Medical History:   Diagnosis Date     Anxiety      Pneumonia of right lower lobe due to infectious organism         PSH  Reviewed, as below    None     ETOH:  Occasional  TOB:  Ages [age] 1/2 pack per day, then vaping for 2-3 years. Quit about 2 years ago.    Daily marijuana use ages 17 - 27. Quit 6 months    Physical examination  /65   Pulse 66   Temp 97.8  F (36.6  C) (Tympanic)   Resp 16   Ht 1.74 m (5' 8.5\")   Wt 94.3 kg (208 lb)   SpO2 97%   BMI 31.17 kg/m     Physical Exam  Eyes:      Conjunctiva/sclera: Conjunctivae normal.   Pulmonary:      Effort: Pulmonary effort is normal.   Neurological:      Mental Status: He is alert and oriented to person, place, and time.   Psychiatric:         Mood and Affect: Mood normal.         Behavior: Behavior normal.         Thought Content: Thought content normal.         Judgment: Judgment normal.          From a personal " perspective, he is self-employed. He lives with his fielenitae, who is with him today. They are getting  in 3 weeks. 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     IMPRESSION (J98.59) Mediastinal mass     This person is a 27 year old male with an anterior mediastinal mass, with lymphoma or thymoma as the most likely diagnoses, though things like thymic carcinoma or germ cell tumor remain in the differential.      PLAN  I spent 30 min on the date of the encounter in chart review, patient visit, review of tests, documentation and/or discussion with other providers about the issues documented above. I reviewed the plan as follows:    Procedure planned: 1. Repeat Core needle biopsy pending tumor board discussion. The other option is excisional biopsy, which would involve thymectomy, likely via a minimally invasive approach.    I discussed the risks and benefits of the procedure.  The risks include bleeding requiring transfusion and/or sternotomy, injury to the phrenic nerve, need for diaphragm plication, injury to intraabdominal organs, prolonged pain, prolonged intubation and death.  The benefits could include improved control of the myasthenia, though this is not guaranteed.        Necessary Preop Tests & Appointments: Preoperative assessment clinic and Pulmonary function testing, if we were to proceed with surgery.    Regional Anesthesia Plan: Intraoperative intercostal nerve block    Anticoagulation Plan: Prophylactic Lovenox      I appreciate the opportunity to participate in the care of your patient and will keep you updated.    Sincerely,    Alexander Campoverde MD        Again, thank you for allowing me to participate in the care of your patient.        Sincerely,        Alexander Campoverde MD

## 2023-01-16 NOTE — LETTER
January 16, 2023    Guero Collins  [address] [address]      To whom it concerns,    Guero Collins is currently under my care, and he currently is having health circumstances in which he is unable to travel/fly during this time frame: 1/16/23-2/28/23.    Kindly, cooperate and assist him in rescheduling or refunding his travel plans. If you have any questions or concerns please reach out to my office at [phone].    Sincerely,       Alexander Campoverde MD

## 2023-01-16 NOTE — NURSING NOTE
"Oncology Rooming Note    January 16, 2023 10:20 AM   Guero Collins is a 27 year old male who presents for:    Chief Complaint   Patient presents with     Oncology Clinic Visit     New Patient     Initial Vitals: /65   Pulse 66   Temp 97.8  F (36.6  C) (Tympanic)   Resp 16   Ht 1.74 m (5' 8.5\")   Wt 94.3 kg (208 lb)   SpO2 97%   BMI 31.17 kg/m   Estimated body mass index is 31.17 kg/m  as calculated from the following:    Height as of this encounter: 1.74 m (5' 8.5\").    Weight as of this encounter: 94.3 kg (208 lb). Body surface area is 2.13 meters squared.  No Pain (0) Comment: Data Unavailable   No LMP for male patient.  Allergies reviewed: Yes  Medications reviewed: Yes    Medications: Medication refills not needed today.  Pharmacy name entered into [employer]:    [id number] - [place], [place] - [address] [address] [place] [address]    Clinical concerns: New Patient        Deirdre Galdamez, [facility]              "

## 2023-01-16 NOTE — PROGRESS NOTES
Patient requested letter from Dr. Campoverde for flying restrictions for 1/16/23-2/28/23. Letter composed, signed by Dr. Campoverde, and given to patient today. Patient aware to call clinic with any further questions or concerns.     Jaylin Nieves RN on 1/16/2023 at 11:12 AM

## 2023-01-17 ENCOUNTER — TELEPHONE (OUTPATIENT)
Dept: SURGERY | Facility: CLINIC | Age: 28
End: 2023-01-17

## 2023-01-17 PROBLEM — F12.10 MARIJUANA ABUSE, CONTINUOUS: Status: ACTIVE | Noted: 2017-12-19

## 2023-01-17 NOTE — TELEPHONE ENCOUNTER
Thoracic Oncology Conference      Patient Name: Guero Collins    Reason for conference discussion (brief overview): 27 yr old male with an anterior mediastinal mass measuring 9.7 x 6.8 x 6.5 cm. He has a 3 pack year history, and used an e-cigarette for 2-3 years and then quit about 2 years ago. He also smoked marijuana for 10 years and quit 6 months ago. He was transferred to [facility] after workup of right sided chest pain. He had a CT guided biopsy on 1/9/23, path showed scant necrotic tissue with clusters of lymphoid and fibrous tissue- non diagnostic. Brain MRI on 1/7 was normal.     Specific Question:  Repeat Bx or resection?    Pertinent Histology:  scant necrotic tissue with clusters of lymphoid and fibrous tissue- non diagnostic    Referring Physician: Dr. Alexander Campoverde    The patient's case was presented at the multidisciplinary conference for the above noted reason.  There was a consensus recommendation for the following actions:     Patient should have a right chamberlain procedure to biopsy more tissue to get a diagnosis.          Case Lead:  Lluvia Pride LPN    Interventional Radiology Staff Present: N/A

## 2023-01-19 ENCOUNTER — TELEPHONE (OUTPATIENT)
Dept: SURGERY | Facility: CLINIC | Age: 28
End: 2023-01-19

## 2023-01-19 NOTE — TELEPHONE ENCOUNTER
Spoke with patient to schedule procedure with Dr. Campoverde   Procedure was scheduled on 01/24/2023 at [facility] OR  Patient will have H&P with TBD-waiting on approval from [facility] nurse    Patient is aware a COVID-19 test is needed before their procedure.   (Patient is aware IP/Thoracic are still requiring COVID-19 test)     Patient will complete a PCR COVID-19 test due to inpatient status, patient will schedule at:     Patient is aware a / is needed day of surgery.   Surgery Letter was sent via [employer], - NO letter sent as surgery is next week. No active my chart.    Patient has my direct contact information for any further questions.   Called and spoke with pt to offer 1/24 surgery date and he agreed. Informed pt writer is waiting on CR and will call back tomorrow with the details. Pt agreed and understood.    Radha Michele on 1/19/2023 at 1:08 PM

## 2023-01-20 ENCOUNTER — PREP FOR PROCEDURE (OUTPATIENT)
Dept: SURGERY | Facility: CLINIC | Age: 28
End: 2023-01-20

## 2023-01-20 DIAGNOSIS — J98.59 MEDIASTINAL MASS: Primary | ICD-10-CM

## 2023-01-20 RX ORDER — CEFAZOLIN SODIUM 2 G/50ML
2 SOLUTION INTRAVENOUS
Status: CANCELLED | OUTPATIENT
Start: 2023-01-20

## 2023-01-20 RX ORDER — CEFAZOLIN SODIUM 2 G/50ML
2 SOLUTION INTRAVENOUS SEE ADMIN INSTRUCTIONS
Status: CANCELLED | OUTPATIENT
Start: 2023-01-20

## 2023-01-20 RX ORDER — HEPARIN SODIUM 5000 [USP'U]/.5ML
5000 INJECTION, SOLUTION INTRAVENOUS; SUBCUTANEOUS
Status: CANCELLED | OUTPATIENT
Start: 2023-01-20

## 2023-01-20 NOTE — TELEPHONE ENCOUNTER
FUTURE VISIT INFORMATION      SURGERY INFORMATION:    Date: 23    Location: uu or    Surgeon:  Alexander Campoverde MD    Anesthesia Type:  general    Procedure: Right anterior thoracotomy, biopsy mediastinal mass    Consult: ov     RECORDS REQUESTED FROM:       Pertinent Medical History: mediastinal mass    Most recent EKG+ Tracin23    Most recent ECHO: 23    Most recent PFT's: 23

## 2023-01-20 NOTE — TELEPHONE ENCOUNTER
Called back and spoke with pt to let him know PAC appointment became available. Informed pt of bran and arrival time and tht appointment is needed prior to surgery. Pt understood and agreed.    Radha Michele on 1/20/2023 at 2:09 PM

## 2023-01-22 LAB
ABO/RH(D): NORMAL
ANTIBODY SCREEN: NEGATIVE
SPECIMEN EXPIRATION DATE: NORMAL

## 2023-01-23 ENCOUNTER — LAB (OUTPATIENT)
Dept: LAB | Facility: CLINIC | Age: 28
End: 2023-01-23

## 2023-01-23 ENCOUNTER — OFFICE VISIT (OUTPATIENT)
Dept: SURGERY | Facility: CLINIC | Age: 28
End: 2023-01-23

## 2023-01-23 ENCOUNTER — ANESTHESIA EVENT (OUTPATIENT)
Dept: SURGERY | Facility: CLINIC | Age: 28
DRG: 168 | End: 2023-01-23
Payer: COMMERCIAL

## 2023-01-23 ENCOUNTER — PRE VISIT (OUTPATIENT)
Dept: SURGERY | Facility: CLINIC | Age: 28
End: 2023-01-23

## 2023-01-23 VITALS
OXYGEN SATURATION: 97 % | TEMPERATURE: 98.4 F | BODY MASS INDEX: 31.4 KG/M2 | RESPIRATION RATE: 16 BRPM | DIASTOLIC BLOOD PRESSURE: 80 MMHG | SYSTOLIC BLOOD PRESSURE: 128 MMHG | WEIGHT: 212 LBS | HEART RATE: 59 BPM | HEIGHT: 69 IN

## 2023-01-23 DIAGNOSIS — J98.59 MEDIASTINAL MASS: ICD-10-CM

## 2023-01-23 DIAGNOSIS — Z01.818 PRE-OP EXAMINATION: ICD-10-CM

## 2023-01-23 DIAGNOSIS — Z01.818 PRE-OP EXAMINATION: Primary | ICD-10-CM

## 2023-01-23 PROCEDURE — 86901 BLOOD TYPING SEROLOGIC RH(D): CPT | Mod: 90 | Performed by: PATHOLOGY

## 2023-01-23 PROCEDURE — 99000 SPECIMEN HANDLING OFFICE-LAB: CPT | Performed by: PATHOLOGY

## 2023-01-23 PROCEDURE — 36415 COLL VENOUS BLD VENIPUNCTURE: CPT | Performed by: PATHOLOGY

## 2023-01-23 PROCEDURE — 86850 RBC ANTIBODY SCREEN: CPT | Mod: 90 | Performed by: PATHOLOGY

## 2023-01-23 PROCEDURE — 86900 BLOOD TYPING SEROLOGIC ABO: CPT | Mod: 90 | Performed by: PATHOLOGY

## 2023-01-23 PROCEDURE — 99203 OFFICE O/P NEW LOW 30 MIN: CPT | Performed by: PHYSICIAN ASSISTANT

## 2023-01-23 ASSESSMENT — PAIN SCALES - GENERAL: PAINLEVEL: NO PAIN (0)

## 2023-01-23 ASSESSMENT — LIFESTYLE VARIABLES: TOBACCO_USE: 1

## 2023-01-23 ASSESSMENT — ENCOUNTER SYMPTOMS: SEIZURES: 0

## 2023-01-23 NOTE — H&P
Pre-Operative H & P     CC:  Preoperative exam to assess for increased cardiopulmonary risk while undergoing surgery and anesthesia.    Date of Encounter: 1/23/2023  Primary Care Physician:  No Ref-Primary, Physician     Reason for visit:   Encounter Diagnoses   Name Primary?     Pre-op examination Yes     Mediastinal mass        YOBANY Collins is a 27 year old male who presents for pre-operative H & P in preparation for  Procedure Information     Case: [id number] Date/Time: 01/24/23 1000    Procedure: Right anterior thoracotomy, biopsy mediastinal mass (Chest)    Anesthesia type: General    Diagnosis: Mediastinal mass [J98.59]    Pre-op diagnosis: Mediastinal mass [J98.59]    Location:  OR  /  OR    Providers: Alexander Campoverde MD          The patient is a 27-year-old man with a past medical history significant for former smoker, history of COVID, obesity and anxiety.  The patient presented to the ED on 1/6/2023 with chest pain.  He had a complete work-up and was found to have a large anterior mediastinal mass.  Given this he was seen by Dr. Campoverde on 1/16/2023 and counseled for the procedure as above.      History is obtained from the patient and chart review    Hx of abnormal bleeding or anti-platelet use: none      Past Medical History  Past Medical History:   Diagnosis Date     Anxiety      History of COVID-19      Mediastinal mass      Obesity      Pneumonia of right lower lobe due to infectious organism        Past Surgical History  No past surgical history on file.    Prior to Admission Medications  Current Outpatient Medications   Medication Sig Dispense Refill     acetaminophen (TYLENOL) 325 MG tablet Take 2-3 tablets (650-975 mg) by mouth every 6 hours as needed for mild pain or fever       cetirizine (ZYRTEC) 10 MG tablet Take 10 mg by mouth daily       fluticasone (FLONASE) 50 MCG/ACT nasal spray 2 sprays       multivitamin w/minerals (THERA-VIT-M) tablet Take 1 tablet by mouth daily          Allergies  No Known Allergies    Social History  Social History     Socioeconomic History     Marital status: Single     Spouse name: Not on file     Number of children: Not on file     Years of education: Not on file     Highest education level: Not on file   Occupational History     Not on file   Tobacco Use     Smoking status: Former     Types: Cigarettes, Vaping Device     Smokeless tobacco: Never   Substance and Sexual Activity     Alcohol use: Not Currently     Drug use: Not Currently     Types: Marijuana     Comment: None in the last 6 months     Sexual activity: Not on file   Other Topics Concern     Not on file   Social History Narrative     Not on file     Social Determinants of Health     Financial Resource Strain: Not on file   Food Insecurity: Not on file   Transportation Needs: Not on file   Physical Activity: Not on file   Stress: Not on file   Social Connections: Not on file   Intimate Partner Violence: Not on file   Housing Stability: Not on file       Family History  No family history on file.    Review of Systems  The complete review of systems is negative other than noted in the HPI or here.   Anesthesia Evaluation   Pt has had prior anesthetic. Type of anesthetic: conscious sedation         ROS/MED HX  ENT/Pulmonary: Comment: Mediastinal mass    (+) SAMUEL risk factors, snores loudly, tobacco use, Past use,  (-) sleep apnea   Neurologic:  - neg neurologic ROS  (-) no seizures and no CVA   Cardiovascular:     (+) -----Previous cardiac testing   Echo: Date: 1/8/23 Results:    Stress Test: Date: Results:    ECG Reviewed: Date: 1/6/23 Results:  Sinus rhythm with sinus arrhythmia, incomplete RBBB  Cath: Date: Results:      METS/Exercise Tolerance: >4 METS    Hematologic:  - neg hematologic  ROS     Musculoskeletal:  - neg musculoskeletal ROS     GI/Hepatic:  - neg GI/hepatic ROS  (-) GERD   Renal/Genitourinary:  - neg Renal ROS     Endo:     (+) Obesity,     Psychiatric/Substance Use:     (+)  psychiatric history anxiety     Infectious Disease:     (+) MRSA,     Malignancy:  - neg malignancy ROS     Other:  - neg other ROS          There were no vitals taken for this visit.    Physical Exam   Constitutional: Awake, alert, cooperative, no apparent distress, and appears stated age.  Eyes: Pupils equal, round and reactive to light, extra ocular muscles intact, sclera clear, conjunctiva normal.  HENT: Normocephalic, oral pharynx with moist mucus membranes, good dentition. No goiter appreciated. Hinojosa  Respiratory: Clear to auscultation bilaterally, no crackles or wheezing.  Cardiovascular: Regular rate and rhythm, normal S1 and S2, and no murmur noted.  Carotids +2, no bruits. No edema. Palpable pulses to radial  DP and PT arteries.   GI: Normal bowel sounds, soft, non-distended, non-tender, no masses palpated, no hepatosplenomegaly.    Lymph/Hematologic: No cervical lymphadenopathy and no supraclavicular lymphadenopathy.  Genitourinary:  defer  Skin: Warm and dry.  No rashes at anticipated surgical site.   Musculoskeletal: Full ROM of neck. There is no redness, warmth, or swelling of the joints. Gross motor strength is normal.    Neurologic: Awake, alert, oriented to name, place and time. Cranial nerves II-XII are grossly intact. Gait is normal.   Neuropsychiatric: Calm, cooperative. Normal affect.     Prior Labs/Diagnostic Studies   All labs and imaging personally reviewed    Latest Reference Range & Units 01/10/23 06:15   Sodium 136 - 145 mmol/L 138   Potassium 3.4 - 5.3 mmol/L 4.5   Chloride 98 - 107 mmol/L 101   Carbon Dioxide (CO2) 22 - 29 mmol/L 22   Urea Nitrogen 6.0 - 20.0 mg/dL 12.7   Creatinine 0.67 - 1.17 mg/dL 0.76   GFR Estimate >60 mL/min/1.73m2 >90   Calcium 8.6 - 10.0 mg/dL 9.8   Anion Gap 7 - 15 mmol/L 15   Albumin 3.5 - 5.2 g/dL 4.4   Protein Total 6.4 - 8.3 g/dL 7.6   Alkaline Phosphatase 40 - 129 U/L 57   ALT 10 - 50 U/L 13   AST 10 - 50 U/L 13   Bilirubin Total <=1.2 mg/dL 0.3   Glucose  70 - 99 mg/dL 124 (H)   WBC 4.0 - 11.0 10e3/uL 15.2 (H)   Hemoglobin 13.3 - 17.7 g/dL 13.7   Hematocrit 40.0 - 53.0 % 42.8   Platelet Count 150 - 450 10e3/uL 399   RBC Count 4.40 - 5.90 10e6/uL 4.66   MCV 78 - 100 fL 92   MCH 26.5 - 33.0 pg 29.4   MCHC 31.5 - 36.5 g/dL 32.0   RDW 10.0 - 15.0 % 13.2   (H): Data is abnormally high    EKG/ stress test - if available please see in ROS above   Echo result w/o MOPS: Interpretation SummaryGlobal and regional left ventricular function is normal with an EF of 60-65%.Global right ventricular function is normal. The right ventricle is normalsize.No significant valvular abnormalities.No pericardial effusion is present.There is no prior study for direct comparison.      PFT Latest Ref Rng & Units 1/12/2023   FVC L 2.68   FEV1 L 2.18   FVC% % 50   FEV1% % 48         The patient's records and results personally reviewed by this provider.     Outside records reviewed from: Care Everywhere      Assessment      Guero Collins is a 27 year old male seen as a PAC referral for risk assessment and optimization for anesthesia.    Plan/Recommendations  Pt will be optimized for the proposed procedure.  See below for details on the assessment, risk, and preoperative recommendations    NEUROLOGY  - No history of TIA, CVA or seizure  -Post Op delirium risk factors:  No risk identified    ENT  - No current airway concerns.  Will need to be reassessed day of surgery.  Mallampati: I  TM: > 3    CARDIAC  - No history of CAD, Hypertension and Afib  - METS (Metabolic Equivalents)  Patient performs 4 or more METS exercise without symptoms            Total Score: 0      RCRI-Very low risk: Class 1 0.4% complication rate            Total Score: 0        PULMONARY  - Obstructive Sleep Apnea  No current risk of obstructive sleep apnea   SAMUEL Low Risk            Total Score: 1    SAMUEL: Male      - anterior mediastinal mass - procedure as above  - Tobacco History      History   Smoking Status     Former      "Types: Cigarettes, Vaping Device   Smokeless Tobacco     Never       GI  PONV Medium Risk  Total Score: 2           1 AN PONV: Patient is not a current smoker    1 AN PONV: Intended Post Op Opioids        /RENAL  - Baseline Creatinine  0.76    ENDOCRINE    - BMI: Estimated body mass index is 31.17 kg/m  as calculated from the following:    Height as of 1/16/23: 1.74 m (5' 8.5\").    Weight as of 1/16/23: 94.3 kg (208 lb).  Obesity (BMI >30)  - No history of Diabetes Mellitus    HEME  VTE Low Risk 0.5%            Total Score: 2    VTE: Male      - No history of abnormal bleeding or antiplatelet use.      ID  ~ History of COVID-19 infection 8/2022 - no residual issues.   ~ MRSA    PSYCH  - Anxiety     Different anesthesia methods/types have been discussed with the patient, but they are aware that the final plan will be decided by the assigned anesthesia provider on the date of service.    The patient is optimized for their procedure. AVS with information on surgery time/arrival time, meds and NPO status given by nursing staff. No further diagnostic testing indicated.      On the day of service:     Prep time: 10 minutes  Visit time: 13 minutes  Documentation time: 3 minutes  ------------------------------------------  Total time: 26 minutes      Celia Orourke PA-C  Preoperative Assessment Center  [facility]  Clinic and [facility] Center  Phone: [phone]  Fax: [phone]  "

## 2023-01-23 NOTE — PATIENT INSTRUCTIONS
Preparing for Your Surgery      Name:  Guero Collins   MRN:  [id number]   :  1995   Today's Date:  2023       Arriving for surgery:  Surgery date:  23  Arrival time:  8:00 am     Surgeries and procedures: Adult patients can have 2 visitors all through the surgery process.   Visiting hours: 8 a.m. to 8:30 p.m.   Hospital: Adult patients and children under age 18 can have 4 visitor at a time     No visitors under the age of 5 are allowed for hospital patients.  Double occupancy rooms: Patients can have only two visitors at a time.   Patients with disabilities: Can have a support person with them (family member, service provider     Or someone well informed about their needs) plus the allowed number of visitors   Patients confirmed or suspected to have symptoms of COVID 19 or flu:     No visitors allowed for adult patients.   Children (under age 18) can have 1 named visitor.   People who are sick or showing symptoms of COVID 19 or flu:    Are not allowed to visit patients--we can only make exceptions in special situations.     Please follow these guidelines for your visit:   Arrive wearing a mask over your mouth and nose; we will give you a medical mask to wear    If you arrive wearing a cloth mask.   Keep it on during your entire visit, even when in patient's room.   If you don't wear a mask we'll ask you to leave.   Clean your hands with alcohol hand . Do this when you arrive at and leave the building and patient room,    And again after you touch your mask or anything in the room.   You can t visit if you have a fever, cough, shortness of breath, muscle aches, headaches, sore throat    Or diarrhea    Stay 6 feet away from others during your visit and between visits   Go directly to and from the room you are visiting.   Stay in the patient s room during your visit. Limit going to other places in the hospital as much as possible   Leave bags and jackets at home or in the car.   For  everyone s health, please don t come and go during your visit. That includes for smoking   during your visit.     Please come to:     [facility] [address]  [address]  [address]    - ? parking is available in front of the hospital      -   Parking is available in the Patient Visitor Ramp on [address] and [facility].     -   When entering the hospital you will be asked COVID screening questions, you will then be directed to Registration.  Registration will direct you to the 3rd floor Surgery waiting room.     -   Please ask if you need an escort or a wheelchair to the Surgery Waiting Room.  Preop number- [phone]      -    Please proceed to [address] on the 3rd floor. [phone]?     - ?If you are in need of directions, wheelchair or escort please stop at the Information Desk in the lobby.  Inform the information person that you are here for surgery; a wheelchair and escort to [address] will be provided.?       What can I eat or drink?  -  You may eat and drink normally up to 8 hours prior to arrival time. (Until 12:00 am - midnight)  -  You may have clear liquids until 2 hours prior to arrival time. (Until 6:00 am)    Examples of clear liquids:  Water  Clear broth  Juices (apple, white grape, white cranberry  and cider) without pulp  Noncarbonated, powder based beverages  (lemonade and Torrey-Aid)  Sodas (Sprite, 7-Up, ginger ale and seltzer)  Coffee or tea (without milk or cream)  Gatorade    -  No Alcohol for at least 24 hours before surgery.     Which medicines can I take?    Hold Aspirin for 7 days before surgery.   Hold Multivitamins for 7 days before surgery.  Hold Supplements for 7 days before surgery.  Hold Ibuprofen (Advil, Motrin) for 1 day before surgery--unless otherwise directed by surgeon.  Hold Naproxen (Aleve) for 4 days before surgery.      -  DO NOT take these medications the day of surgery:  Multivitamin - stop 7  days before surgery    -  PLEASE TAKE these medications the day of surgery:  Tylenol if needed    How do I prepare myself?  - Please take 2 showers before surgery using Scrubcare or Hibiclens soap.    Use this soap only from the neck to your toes.     Leave the soap on your skin for one minute--then rinse thoroughly.      You may use your own shampoo and conditioner. No other hair products.   - Please remove all jewelry and body piercings.  - No lotions, deodorants or fragrance.  - No makeup or fingernail polish.   - Bring your ID and insurance card.    -If you have a Deep Brain Stimulator, Spinal Cord Stimulator, or any Neuro Stimulator device---you must bring the remote control to the hospital.      Covid testing policy as of 12/06/2022  Your surgeon will notify and schedule you for a COVID test if one is needed before surgery--please direct any questions or COVID symptoms to your surgeon      Questions or Concerns:    - For any questions regarding the day of surgery or your hospital stay, please contact the Pre Admission Nursing Office at [phone].     - If you have health changes between today and your surgery, please call your surgeon.     - For questions after surgery, please call your surgeons office.

## 2023-01-24 ENCOUNTER — ANESTHESIA (OUTPATIENT)
Dept: SURGERY | Facility: CLINIC | Age: 28
DRG: 168 | End: 2023-01-24
Payer: COMMERCIAL

## 2023-01-30 NOTE — TELEPHONE ENCOUNTER
Received call from pt wanting to reschedule. Informed pt writer would need to req approval and get back to him. Sent my chart msg to pt to see if 2/17 would work IF approved.    Radha Michele on 1/30/2023 at 12:47 PM

## 2023-01-31 NOTE — TELEPHONE ENCOUNTER
Spoke with patient via my chart to schedule procedure with Dr. Campoverde   Procedure was scheduled on 02/17/23 at [facility]  Patient will have H&P with Completed 01/23/23    Patient is aware a COVID-19 test is needed before their procedure.   (Patient is aware IP/Thoracic are still requiring COVID-19 test)     Patient will complete a PCR COVID-19 test due to inpatient status, patient will schedule at: Pharmacy    Patient is aware a / is needed day of surgery.   Surgery Letter was sent via [employer],     Patient has my direct contact information for any further questions.   Appointments in Next Year    Mar 17, 2023  1:00 PM  (Arrive by 12:45 PM)  XR CHEST 2 VIEWS with [id number]  [facility] [place] ([facility] ) [phone]   Mar 17, 2023  2:15 PM  (Arrive by 2:00 PM)  Return Patient with [facility] ([facility] ) [phone]

## 2023-02-08 ENCOUNTER — PATIENT OUTREACH (OUTPATIENT)
Dept: ONCOLOGY | Facility: CLINIC | Age: 28
End: 2023-02-08
Payer: COMMERCIAL

## 2023-02-08 DIAGNOSIS — J98.59 MEDIASTINAL MASS: Primary | ICD-10-CM

## 2023-02-08 NOTE — PROGRESS NOTES
Writer attempted to call patient to review pre-op instructions. Unable to connect with patient, requested call back.     Jaylin Nieves RN on 2/8/2023 at 4:08 PM

## 2023-02-09 NOTE — PROGRESS NOTES
Late entry from 2/9/23:    Pre-procedure education, as outlined by instructions sent on 1/31/23 by Radha Michele, for planned Right anterior thoracotomy, biopsy mediastinal mass on 2/17/23 completed by writer. Writer reviewed how to prepare for procedure (including eating and drinking restrictions, and showering instructions), briefly reviewed what the procedure is, expected post-op recovery, signs/symptoms to monitor for, and when to seek medical attention. Writer assisted with scheduled COVID test apppointment for patient, per patient request. Also, reviewed post-op follow up appointments with patient as well.     Patient asked for more details regarding the procedure itself. Writer advised that he should speak with Dr. Campoverde directly, and assisted with scheduling a phone visit for Monday 2/13/23.    Writer will continue to assist, as needed, with patient's care.    Jaylin Nieves RN on 2/13/2023 at 11:42 AM

## 2023-02-10 DIAGNOSIS — R22.1 MASS OF NECK: ICD-10-CM

## 2023-02-10 DIAGNOSIS — C85.90 LYMPHOMA (H): Primary | ICD-10-CM

## 2023-02-12 ENCOUNTER — HEALTH MAINTENANCE LETTER (OUTPATIENT)
Age: 28
End: 2023-02-12

## 2023-02-13 ENCOUNTER — VIRTUAL VISIT (OUTPATIENT)
Dept: ONCOLOGY | Facility: CLINIC | Age: 28
End: 2023-02-13
Attending: THORACIC SURGERY (CARDIOTHORACIC VASCULAR SURGERY)
Payer: COMMERCIAL

## 2023-02-13 DIAGNOSIS — J98.59 MEDIASTINAL MASS: Primary | ICD-10-CM

## 2023-02-13 PROCEDURE — G0463 HOSPITAL OUTPT CLINIC VISIT: HCPCS | Mod: PN,TEL | Performed by: THORACIC SURGERY (CARDIOTHORACIC VASCULAR SURGERY)

## 2023-02-13 PROCEDURE — 99212 OFFICE O/P EST SF 10 MIN: CPT | Mod: TEL | Performed by: THORACIC SURGERY (CARDIOTHORACIC VASCULAR SURGERY)

## 2023-02-13 NOTE — NURSING NOTE
Is the patient currently in the state of [place]? YES    Visit mode:TELEPHONE    If the visit is dropped, the patient can be reconnected by: TELEPHONE VISIT: Phone number: [phone]    Will anyone else be joining the visit? NO      How would you like to obtain your AVS? MyChart    Are changes needed to the allergy or medication list? NO    Comments or concerns regarding today's visit:     PO

## 2023-02-13 NOTE — LETTER
2/13/2023         RE: Guero Collins  [address] [facility] [address]        Dear Colleague,    Thank you for referring your patient, Guero Collins, to the [facility]. Please see a copy of my visit note below.    Video-Visit Details    Type of service:  Telephone Visit    Phone call duration: 5 min    Originating Location (pt. Location): Home        Distant Location (provider location):  On-site    Mode of Communication:  Video Conference [facility]          THORACIC SURGERY -  OFFICE VISIT             I spoke with Guero Collins in follow up for the evaluation and treatment of an anterior mediastinal mass.      HPI  Guero Collins is a 27 year old male who I first met on 1/16/2023.  He was transferred to [facility] after workup of right sided chest pain showed a large anterior mediastinal mass. He had chest pain for 4 days and no facial flushing or swelling. He had no fevers or fatigue.   On 1/16, he was feeling fine and had no new symptoms.     Today, 2/13/2023,  he returns by phone to discuss the upcoming biopsy by anterior thoracotomy, as recommended by the tumor board.      Previsit Tests   CT-guided biopsy 1/9/2023:  Scant necrotic tissue with clusters of lymphoid and fibrous tissue - nondiagnostic        MR Brain 1/7/2023:   Normal     Testicular ultrasound 1/7/2023:  Normal     CT chest 1/6/2023:    9.7 x 6.8 x 6.5 cm     Labs 1/6/2023:  Beta-HCG:  Normal  AFP:  Normal  LDH: 663     PMH  Reviewed, as below     Past Medical History        Past Medical History:   Diagnosis Date     Anxiety       Pneumonia of right lower lobe due to infectious organism              PSH  None     Social History   ETOH:  Occasional  TOB:  Ages [age] 1/2 pack per day, then vaping for 2-3 years. Quit about 2 years ago.    Daily marijuana use ages 17 - 27. Quit 6 months     Physical examination  Deferred today due to phone visit         From a personal perspective, he is self-employed. He lives  with his fifannie, who is with him today. They are getting  in 3 weeks. 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      IMPRESSION (J98.59) Mediastinal mass     This person is a 27 year old male with an anterior mediastinal mass, with lymphoma or thymoma as the most likely diagnoses, though things like thymic carcinoma or germ cell tumor remain in the differential.       PLAN  I spent 10 min on the date of the encounter in chart review, patient visit, review of tests, documentation and/or discussion with other providers about the issues documented above. I reviewed the plan as follows:     Procedure planned: 1. Right anterior thoracotomy and biopsy of anterior mediastinal mass     I discussed the risks and benefits of the procedure.  The risks include bleeding requiring transfusion and/or sternotomy, prolonged pain and death.       Necessary Preop Tests & Appointments: None     Regional Anesthesia Plan: Intraoperative intercostal nerve block     Anticoagulation Plan: Prophylactic Lovenox        I appreciate the opportunity to participate in the care of your patient and will keep you updated.     Sincerely,     Alexander Campoverde MD          Again, thank you for allowing me to participate in the care of your patient.        Sincerely,        Alexander Campoverde MD

## 2023-02-13 NOTE — PROGRESS NOTES
Video-Visit Details    Type of service:  Telephone Visit    Phone call duration: 5 min    Originating Location (pt. Location): Home        Distant Location (provider location):  On-site    Mode of Communication:  Video Conference via [employer]          THORACIC SURGERY -  OFFICE VISIT             I spoke with Guero Collins in follow up for the evaluation and treatment of an anterior mediastinal mass.      HPI  Guero Collins is a 27 year old male who I first met on 1/16/2023.  He was transferred to [facility] after workup of right sided chest pain showed a large anterior mediastinal mass. He had chest pain for 4 days and no facial flushing or swelling. He had no fevers or fatigue.   On 1/16, he was feeling fine and had no new symptoms.     Today, 2/13/2023,  he returns by phone to discuss the upcoming biopsy by anterior thoracotomy, as recommended by the tumor board.      Previsit Tests   CT-guided biopsy 1/9/2023:  Scant necrotic tissue with clusters of lymphoid and fibrous tissue - nondiagnostic        MR Brain 1/7/2023:   Normal     Testicular ultrasound 1/7/2023:  Normal     CT chest 1/6/2023:    9.7 x 6.8 x 6.5 cm     Labs 1/6/2023:  Beta-HCG:  Normal  AFP:  Normal  LDH: 663     PMH  Reviewed, as below     Past Medical History        Past Medical History:   Diagnosis Date     Anxiety       Pneumonia of right lower lobe due to infectious organism              PSH  None     Social History   ETOH:  Occasional  TOB:  Ages [age] 1/2 pack per day, then vaping for 2-3 years. Quit about 2 years ago.    Daily marijuana use ages 17 - 27. Quit 6 months     Physical examination  Deferred today due to phone visit         From a personal perspective, he is self-employed. He lives with his fiancee, who is with him today. They are getting  in 3 weeks. 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      IMPRESSION (J98.59) Mediastinal mass     This person is a 27 year old male with an  anterior mediastinal mass, with lymphoma or thymoma as the most likely diagnoses, though things like thymic carcinoma or germ cell tumor remain in the differential.       PLAN  I spent 10 min on the date of the encounter in chart review, patient visit, review of tests, documentation and/or discussion with other providers about the issues documented above. I reviewed the plan as follows:     Procedure planned: 1. Right anterior thoracotomy and biopsy of anterior mediastinal mass     I discussed the risks and benefits of the procedure.  The risks include bleeding requiring transfusion and/or sternotomy, prolonged pain and death.       Necessary Preop Tests & Appointments: None     Regional Anesthesia Plan: Intraoperative intercostal nerve block     Anticoagulation Plan: Prophylactic Lovenox        I appreciate the opportunity to participate in the care of your patient and will keep you updated.     Sincerely,     Alexander Campoverde MD

## 2023-02-13 NOTE — LETTER
2/13/2023         RE: Guero Collins  [address] [facility] [address]        Dear Colleague,    Thank you for referring your patient, Guero Collins, to the [facility]. Please see a copy of my visit note below.    Video-Visit Details    Type of service:  Telephone Visit    Phone call duration: 5 min    Originating Location (pt. Location): Home        Distant Location (provider location):  On-site    Mode of Communication:  [facility] Conference [facility]          THORACIC SURGERY -  OFFICE VISIT             I spoke with Guero Collins in follow up for the evaluation and treatment of an anterior mediastinal mass.      HPI  Guero Collins is a 27 year old male who I first met on 1/16/2023.  He was transferred to [facility] after workup of right sided chest pain showed a large anterior mediastinal mass. He had chest pain for 4 days and no facial flushing or swelling. He had no fevers or fatigue.   On 1/16, he was feeling fine and had no new symptoms.     Today, 2/13/2023,  he returns by phone to discuss the upcoming biopsy by anterior thoracotomy, as recommended by the tumor board.      Previsit Tests   CT-guided biopsy 1/9/2023:  Scant necrotic tissue with clusters of lymphoid and fibrous tissue - nondiagnostic        MR Brain 1/7/2023:   Normal     Testicular ultrasound 1/7/2023:  Normal     CT chest 1/6/2023:    9.7 x 6.8 x 6.5 cm     Labs 1/6/2023:  Beta-HCG:  Normal  AFP:  Normal  LDH: 663     PMH  Reviewed, as below     Past Medical History        Past Medical History:   Diagnosis Date     Anxiety       Pneumonia of right lower lobe due to infectious organism              PSH  None     Social History   ETOH:  Occasional  TOB:  Ages [age] 1/2 pack per day, then vaping for 2-3 years. Quit about 2 years ago.    Daily marijuana use ages 17 - 27. Quit 6 months     Physical examination  Deferred today due to phone visit         From a personal perspective, he is self-employed. He lives  with his fifannie, who is with him today. They are getting  in 3 weeks. 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      IMPRESSION (J98.59) Mediastinal mass     This person is a 27 year old male with an anterior mediastinal mass, with lymphoma or thymoma as the most likely diagnoses, though things like thymic carcinoma or germ cell tumor remain in the differential.       PLAN  I spent 10 min on the date of the encounter in chart review, patient visit, review of tests, documentation and/or discussion with other providers about the issues documented above. I reviewed the plan as follows:     Procedure planned: 1. Right anterior thoracotomy and biopsy of anterior mediastinal mass     I discussed the risks and benefits of the procedure.  The risks include bleeding requiring transfusion and/or sternotomy, prolonged pain and death.       Necessary Preop Tests & Appointments: None     Regional Anesthesia Plan: Intraoperative intercostal nerve block     Anticoagulation Plan: Prophylactic Lovenox        I appreciate the opportunity to participate in the care of your patient and will keep you updated.     Sincerely,     Alexander Campoverde MD          Again, thank you for allowing me to participate in the care of your patient.        Sincerely,        Alexander Campoverde MD

## 2023-02-14 ENCOUNTER — LAB (OUTPATIENT)
Dept: LAB | Facility: CLINIC | Age: 28
End: 2023-02-14
Payer: COMMERCIAL

## 2023-02-14 DIAGNOSIS — J98.59 MEDIASTINAL MASS: ICD-10-CM

## 2023-02-14 LAB — SARS-COV-2 RNA RESP QL NAA+PROBE: NEGATIVE

## 2023-02-14 PROCEDURE — U0003 INFECTIOUS AGENT DETECTION BY NUCLEIC ACID (DNA OR RNA); SEVERE ACUTE RESPIRATORY SYNDROME CORONAVIRUS 2 (SARS-COV-2) (CORONAVIRUS DISEASE [COVID-19]), AMPLIFIED PROBE TECHNIQUE, MAKING USE OF HIGH THROUGHPUT TECHNOLOGIES AS DESCRIBED BY CMS-2020-01-R: HCPCS

## 2023-02-14 PROCEDURE — U0005 INFEC AGEN DETEC AMPLI PROBE: HCPCS

## 2023-02-16 ASSESSMENT — LIFESTYLE VARIABLES: TOBACCO_USE: 1

## 2023-02-16 NOTE — ANESTHESIA PREPROCEDURE EVALUATION
Anesthesia Pre-Procedure Evaluation    Patient: Guero Collins   MRN: [id number] : 1995        Procedure : Procedure(s):  Right anterior thoracotomy, biopsy mediastinal mass          Past Medical History:   Diagnosis Date     Anxiety      History of COVID-19      Mediastinal mass      Obesity      Pneumonia of right lower lobe due to infectious organism       Past Surgical History:   Procedure Laterality Date     BIOPSY  2023    lung     wisdom teeth extraction        No Known Allergies   Social History     Tobacco Use     Smoking status: Former     Types: Cigarettes, Vaping Device     Quit date: 2021     Years since quittin.2     Smokeless tobacco: Never     Tobacco comments:     Quit marijuana about 6 mo ago. 23 PO   Substance Use Topics     Alcohol use: Yes      Wt Readings from Last 1 Encounters:   23 96.2 kg (212 lb)        Anesthesia Evaluation   Pt has had prior anesthetic.     No history of anesthetic complications       ROS/MED HX  ENT/Pulmonary: Comment: Large anterior mediastinal mass    (+) tobacco use, Past use,     Neurologic:    (-) no seizures and no CVA   Cardiovascular:     (+) -----Previous cardiac testing   Echo: Date: 23 Results:  Interpretation Summary  Global and regional left ventricular function is normal with an EF of 60-65%.  Global right ventricular function is normal. The right ventricle is normal  size.  No significant valvular abnormalities.  No pericardial effusion is present.  There is no prior study for direct comparison.  Stress Test: Date: Results:    ECG Reviewed: Date: Results:    Cath: Date: Results:   (-) NOEL   METS/Exercise Tolerance:     Hematologic:    (-) anemia   Musculoskeletal:       GI/Hepatic:    (-) GERD   Renal/Genitourinary:  - neg Renal ROS     Endo:     (+) Obesity,     Psychiatric/Substance Use:     (+) psychiatric history anxiety     Infectious Disease:       Malignancy:       Other:  - neg other ROS          Physical  Exam    Airway        Mallampati: II   TM distance: > 3 FB   Neck ROM: full   Mouth opening: > 3 cm    Respiratory Devices and Support         Dental       (+) Completely normal teeth      Cardiovascular          Rhythm and rate: regular and normal     Pulmonary           breath sounds clear to auscultation           OUTSIDE LABS:  CBC:   Lab Results   Component Value Date    WBC 15.2 (H) 01/10/2023    WBC 10.2 01/09/2023    HGB 13.7 01/10/2023    HGB 14.0 01/09/2023    HCT 42.8 01/10/2023    HCT 43.8 01/09/2023     01/10/2023     01/09/2023     BMP:   Lab Results   Component Value Date     01/10/2023     01/09/2023    POTASSIUM 4.5 01/10/2023    POTASSIUM 4.3 01/09/2023    CHLORIDE 101 01/10/2023    CHLORIDE 106 01/09/2023    CO2 22 01/10/2023    CO2 21 (L) 01/09/2023    BUN 12.7 01/10/2023    BUN 9.4 01/09/2023    CR 0.76 01/10/2023    CR 0.73 01/09/2023     (H) 01/10/2023     (H) 01/09/2023     COAGS:   Lab Results   Component Value Date    INR 1.09 01/09/2023     POC: No results found for: BGM, HCG, HCGS  HEPATIC:   Lab Results   Component Value Date    ALBUMIN 4.4 01/10/2023    PROTTOTAL 7.6 01/10/2023    ALT 13 01/10/2023    AST 13 01/10/2023    ALKPHOS 57 01/10/2023    BILITOTAL 0.3 01/10/2023     OTHER:   Lab Results   Component Value Date    SHIMA 9.8 01/10/2023    PHOS 3.2 01/08/2023       Anesthesia Plan    ASA Status:  2   NPO Status:  NPO Appropriate    Anesthesia Type: General.     - Airway: ETT   Induction: Intravenous.   Maintenance: Balanced.   Techniques and Equipment:     - Airway: Double lumen ETT     - Lines/Monitors: 2nd IV, Arterial Line     - Blood: T&S     Consents    Anesthesia Plan(s) and associated risks, benefits, and realistic alternatives discussed. Questions answered and patient/representative(s) expressed understanding.    - Discussed:     - Discussed with:  Patient, Parent (Mother and/or Father)      - Extended Intubation/Ventilatory Support  Discussed: Yes.      - Patient is DNR/DNI Status: No    Use of blood products discussed: Yes.     - Discussed with: Patient.     - Consented: consented to blood products            Reason for refusal: other.     Postoperative Care    Pain management: IV analgesics, Oral pain medications, Multi-modal analgesia.   PONV prophylaxis: Ondansetron (or other 5HT-3), Dexamethasone or Solumedrol     Comments:                CELESTINO CRAVEN MD

## 2023-02-17 ENCOUNTER — HOSPITAL ENCOUNTER (INPATIENT)
Facility: CLINIC | Age: 28
LOS: 1 days | Discharge: HOME | DRG: 168 | End: 2023-02-18
Attending: THORACIC SURGERY (CARDIOTHORACIC VASCULAR SURGERY) | Admitting: THORACIC SURGERY (CARDIOTHORACIC VASCULAR SURGERY)
Payer: COMMERCIAL

## 2023-02-17 ENCOUNTER — APPOINTMENT (OUTPATIENT)
Dept: GENERAL RADIOLOGY | Facility: CLINIC | Age: 28
DRG: 168 | End: 2023-02-17
Attending: THORACIC SURGERY (CARDIOTHORACIC VASCULAR SURGERY)
Payer: COMMERCIAL

## 2023-02-17 DIAGNOSIS — J98.59 MEDIASTINAL MASS: Primary | ICD-10-CM

## 2023-02-17 LAB
CREAT SERPL-MCNC: 0.78 MG/DL (ref 0.67–1.17)
GFR SERPL CREATININE-BSD FRML MDRD: >90 ML/MIN/1.73M2
GLUCOSE BLDC GLUCOMTR-MCNC: 97 MG/DL (ref 70–99)

## 2023-02-17 PROCEDURE — 250N000011 HC RX IP 250 OP 636: Performed by: CLINICAL NURSE SPECIALIST

## 2023-02-17 PROCEDURE — 250N000011 HC RX IP 250 OP 636: Performed by: ANESTHESIOLOGY

## 2023-02-17 PROCEDURE — 88342 IMHCHEM/IMCYTCHM 1ST ANTB: CPT | Mod: 26 | Performed by: STUDENT IN AN ORGANIZED HEALTH CARE EDUCATION/TRAINING PROGRAM

## 2023-02-17 PROCEDURE — 370N000017 HC ANESTHESIA TECHNICAL FEE, PER MIN: Performed by: THORACIC SURGERY (CARDIOTHORACIC VASCULAR SURGERY)

## 2023-02-17 PROCEDURE — 39010 EXPLORATION OF CHEST: CPT | Mod: GC | Performed by: THORACIC SURGERY (CARDIOTHORACIC VASCULAR SURGERY)

## 2023-02-17 PROCEDURE — 82565 ASSAY OF CREATININE: CPT | Performed by: STUDENT IN AN ORGANIZED HEALTH CARE EDUCATION/TRAINING PROGRAM

## 2023-02-17 PROCEDURE — 250N000013 HC RX MED GY IP 250 OP 250 PS 637: Performed by: ANESTHESIOLOGY

## 2023-02-17 PROCEDURE — 0WBC0ZX EXCISION OF MEDIASTINUM, OPEN APPROACH, DIAGNOSTIC: ICD-10-PCS | Performed by: THORACIC SURGERY (CARDIOTHORACIC VASCULAR SURGERY)

## 2023-02-17 PROCEDURE — 88189 FLOWCYTOMETRY/READ 16 & >: CPT | Performed by: STUDENT IN AN ORGANIZED HEALTH CARE EDUCATION/TRAINING PROGRAM

## 2023-02-17 PROCEDURE — 999N000141 HC STATISTIC PRE-PROCEDURE NURSING ASSESSMENT: Performed by: THORACIC SURGERY (CARDIOTHORACIC VASCULAR SURGERY)

## 2023-02-17 PROCEDURE — 250N000025 HC SEVOFLURANE, PER MIN: Performed by: THORACIC SURGERY (CARDIOTHORACIC VASCULAR SURGERY)

## 2023-02-17 PROCEDURE — 250N000011 HC RX IP 250 OP 636: Performed by: THORACIC SURGERY (CARDIOTHORACIC VASCULAR SURGERY)

## 2023-02-17 PROCEDURE — 88312 SPECIAL STAINS GROUP 1: CPT | Mod: 26 | Performed by: STUDENT IN AN ORGANIZED HEALTH CARE EDUCATION/TRAINING PROGRAM

## 2023-02-17 PROCEDURE — 250N000013 HC RX MED GY IP 250 OP 250 PS 637: Performed by: STUDENT IN AN ORGANIZED HEALTH CARE EDUCATION/TRAINING PROGRAM

## 2023-02-17 PROCEDURE — 999N000065 XR CHEST PORT 1 VIEW

## 2023-02-17 PROCEDURE — 360N000077 HC SURGERY LEVEL 4, PER MIN: Performed by: THORACIC SURGERY (CARDIOTHORACIC VASCULAR SURGERY)

## 2023-02-17 PROCEDURE — 250N000009 HC RX 250: Performed by: ANESTHESIOLOGY

## 2023-02-17 PROCEDURE — 120N000002 HC R&B MED SURG/OB UMMC

## 2023-02-17 PROCEDURE — 250N000009 HC RX 250: Performed by: THORACIC SURGERY (CARDIOTHORACIC VASCULAR SURGERY)

## 2023-02-17 PROCEDURE — 88331 PATH CONSLTJ SURG 1 BLK 1SPC: CPT | Mod: 26 | Performed by: STUDENT IN AN ORGANIZED HEALTH CARE EDUCATION/TRAINING PROGRAM

## 2023-02-17 PROCEDURE — 258N000003 HC RX IP 258 OP 636: Performed by: ANESTHESIOLOGY

## 2023-02-17 PROCEDURE — 88185 FLOWCYTOMETRY/TC ADD-ON: CPT | Performed by: FAMILY MEDICINE

## 2023-02-17 PROCEDURE — 88305 TISSUE EXAM BY PATHOLOGIST: CPT | Mod: TC | Performed by: THORACIC SURGERY (CARDIOTHORACIC VASCULAR SURGERY)

## 2023-02-17 PROCEDURE — 710N000009 HC RECOVERY PHASE 1, LEVEL 1, PER MIN: Performed by: THORACIC SURGERY (CARDIOTHORACIC VASCULAR SURGERY)

## 2023-02-17 PROCEDURE — 71045 X-RAY EXAM CHEST 1 VIEW: CPT | Mod: 26 | Performed by: RADIOLOGY

## 2023-02-17 PROCEDURE — 88360 TUMOR IMMUNOHISTOCHEM/MANUAL: CPT | Mod: 26 | Performed by: STUDENT IN AN ORGANIZED HEALTH CARE EDUCATION/TRAINING PROGRAM

## 2023-02-17 PROCEDURE — 88184 FLOWCYTOMETRY/ TC 1 MARKER: CPT | Performed by: FAMILY MEDICINE

## 2023-02-17 PROCEDURE — 88331 PATH CONSLTJ SURG 1 BLK 1SPC: CPT | Mod: TC | Performed by: THORACIC SURGERY (CARDIOTHORACIC VASCULAR SURGERY)

## 2023-02-17 PROCEDURE — 250N000013 HC RX MED GY IP 250 OP 250 PS 637

## 2023-02-17 PROCEDURE — 88305 TISSUE EXAM BY PATHOLOGIST: CPT | Mod: 26 | Performed by: STUDENT IN AN ORGANIZED HEALTH CARE EDUCATION/TRAINING PROGRAM

## 2023-02-17 PROCEDURE — 88307 TISSUE EXAM BY PATHOLOGIST: CPT | Mod: 26 | Performed by: STUDENT IN AN ORGANIZED HEALTH CARE EDUCATION/TRAINING PROGRAM

## 2023-02-17 PROCEDURE — 272N000001 HC OR GENERAL SUPPLY STERILE: Performed by: THORACIC SURGERY (CARDIOTHORACIC VASCULAR SURGERY)

## 2023-02-17 RX ORDER — METHOCARBAMOL 750 MG/1
750 TABLET, FILM COATED ORAL EVERY 6 HOURS PRN
Status: DISCONTINUED | OUTPATIENT
Start: 2023-02-17 | End: 2023-02-18 | Stop reason: HOSPADM

## 2023-02-17 RX ORDER — ENOXAPARIN SODIUM 100 MG/ML
40 INJECTION SUBCUTANEOUS EVERY 24 HOURS
Status: DISCONTINUED | OUTPATIENT
Start: 2023-02-18 | End: 2023-02-18

## 2023-02-17 RX ORDER — LABETALOL HYDROCHLORIDE 5 MG/ML
10 INJECTION, SOLUTION INTRAVENOUS
Status: DISCONTINUED | OUTPATIENT
Start: 2023-02-17 | End: 2023-02-17 | Stop reason: HOSPADM

## 2023-02-17 RX ORDER — HYDROMORPHONE HCL IN WATER/PF 6 MG/30 ML
0.4 PATIENT CONTROLLED ANALGESIA SYRINGE INTRAVENOUS
Status: DISCONTINUED | OUTPATIENT
Start: 2023-02-17 | End: 2023-02-18 | Stop reason: HOSPADM

## 2023-02-17 RX ORDER — NALOXONE HYDROCHLORIDE 0.4 MG/ML
0.2 INJECTION, SOLUTION INTRAMUSCULAR; INTRAVENOUS; SUBCUTANEOUS
Status: DISCONTINUED | OUTPATIENT
Start: 2023-02-17 | End: 2023-02-18 | Stop reason: HOSPADM

## 2023-02-17 RX ORDER — MAGNESIUM SULFATE HEPTAHYDRATE 40 MG/ML
2 INJECTION, SOLUTION INTRAVENOUS ONCE
Status: COMPLETED | OUTPATIENT
Start: 2023-02-17 | End: 2023-02-17

## 2023-02-17 RX ORDER — ACETAMINOPHEN 325 MG/1
650 TABLET ORAL EVERY 4 HOURS PRN
Status: DISCONTINUED | OUTPATIENT
Start: 2023-02-20 | End: 2023-02-18

## 2023-02-17 RX ORDER — OXYCODONE HYDROCHLORIDE 5 MG/1
5 TABLET ORAL EVERY 4 HOURS PRN
Status: DISCONTINUED | OUTPATIENT
Start: 2023-02-17 | End: 2023-02-17

## 2023-02-17 RX ORDER — DEXAMETHASONE SODIUM PHOSPHATE 4 MG/ML
INJECTION, SOLUTION INTRA-ARTICULAR; INTRALESIONAL; INTRAMUSCULAR; INTRAVENOUS; SOFT TISSUE PRN
Status: DISCONTINUED | OUTPATIENT
Start: 2023-02-17 | End: 2023-02-17

## 2023-02-17 RX ORDER — ONDANSETRON 2 MG/ML
4 INJECTION INTRAMUSCULAR; INTRAVENOUS EVERY 6 HOURS PRN
Status: DISCONTINUED | OUTPATIENT
Start: 2023-02-17 | End: 2023-02-18 | Stop reason: HOSPADM

## 2023-02-17 RX ORDER — NALOXONE HYDROCHLORIDE 0.4 MG/ML
0.4 INJECTION, SOLUTION INTRAMUSCULAR; INTRAVENOUS; SUBCUTANEOUS
Status: DISCONTINUED | OUTPATIENT
Start: 2023-02-17 | End: 2023-02-18 | Stop reason: HOSPADM

## 2023-02-17 RX ORDER — OXYCODONE HYDROCHLORIDE 5 MG/1
5 TABLET ORAL EVERY 4 HOURS PRN
Status: DISCONTINUED | OUTPATIENT
Start: 2023-02-17 | End: 2023-02-18 | Stop reason: HOSPADM

## 2023-02-17 RX ORDER — BISACODYL 10 MG
10 SUPPOSITORY, RECTAL RECTAL DAILY PRN
Status: DISCONTINUED | OUTPATIENT
Start: 2023-02-17 | End: 2023-02-18 | Stop reason: HOSPADM

## 2023-02-17 RX ORDER — SODIUM CHLORIDE, SODIUM LACTATE, POTASSIUM CHLORIDE, CALCIUM CHLORIDE 600; 310; 30; 20 MG/100ML; MG/100ML; MG/100ML; MG/100ML
INJECTION, SOLUTION INTRAVENOUS CONTINUOUS
Status: DISCONTINUED | OUTPATIENT
Start: 2023-02-17 | End: 2023-02-18

## 2023-02-17 RX ORDER — CEFAZOLIN SODIUM/WATER 2 G/20 ML
2 SYRINGE (ML) INTRAVENOUS SEE ADMIN INSTRUCTIONS
Status: DISCONTINUED | OUTPATIENT
Start: 2023-02-17 | End: 2023-02-17 | Stop reason: HOSPADM

## 2023-02-17 RX ORDER — CEFAZOLIN SODIUM/WATER 2 G/20 ML
2 SYRINGE (ML) INTRAVENOUS
Status: COMPLETED | OUTPATIENT
Start: 2023-02-17 | End: 2023-02-17

## 2023-02-17 RX ORDER — ENOXAPARIN SODIUM 100 MG/ML
40 INJECTION SUBCUTANEOUS
Status: COMPLETED | OUTPATIENT
Start: 2023-02-17 | End: 2023-02-17

## 2023-02-17 RX ORDER — HYDRALAZINE HYDROCHLORIDE 20 MG/ML
2.5-5 INJECTION INTRAMUSCULAR; INTRAVENOUS EVERY 10 MIN PRN
Status: DISCONTINUED | OUTPATIENT
Start: 2023-02-17 | End: 2023-02-17 | Stop reason: HOSPADM

## 2023-02-17 RX ORDER — POLYETHYLENE GLYCOL 3350 17 G/17G
17 POWDER, FOR SOLUTION ORAL DAILY
Status: DISCONTINUED | OUTPATIENT
Start: 2023-02-18 | End: 2023-02-18 | Stop reason: HOSPADM

## 2023-02-17 RX ORDER — ONDANSETRON 2 MG/ML
INJECTION INTRAMUSCULAR; INTRAVENOUS PRN
Status: DISCONTINUED | OUTPATIENT
Start: 2023-02-17 | End: 2023-02-17

## 2023-02-17 RX ORDER — LIDOCAINE HYDROCHLORIDE 20 MG/ML
INJECTION, SOLUTION INFILTRATION; PERINEURAL PRN
Status: DISCONTINUED | OUTPATIENT
Start: 2023-02-17 | End: 2023-02-17

## 2023-02-17 RX ORDER — HYDROMORPHONE HYDROCHLORIDE 1 MG/ML
0.5 INJECTION, SOLUTION INTRAMUSCULAR; INTRAVENOUS; SUBCUTANEOUS EVERY 5 MIN PRN
Status: DISCONTINUED | OUTPATIENT
Start: 2023-02-17 | End: 2023-02-17 | Stop reason: HOSPADM

## 2023-02-17 RX ORDER — AMOXICILLIN 250 MG
1 CAPSULE ORAL 2 TIMES DAILY
Status: DISCONTINUED | OUTPATIENT
Start: 2023-02-17 | End: 2023-02-18 | Stop reason: HOSPADM

## 2023-02-17 RX ORDER — HYDROMORPHONE HCL IN WATER/PF 6 MG/30 ML
0.2 PATIENT CONTROLLED ANALGESIA SYRINGE INTRAVENOUS
Status: DISCONTINUED | OUTPATIENT
Start: 2023-02-17 | End: 2023-02-18 | Stop reason: HOSPADM

## 2023-02-17 RX ORDER — SODIUM CHLORIDE, SODIUM LACTATE, POTASSIUM CHLORIDE, CALCIUM CHLORIDE 600; 310; 30; 20 MG/100ML; MG/100ML; MG/100ML; MG/100ML
INJECTION, SOLUTION INTRAVENOUS CONTINUOUS
Status: DISCONTINUED | OUTPATIENT
Start: 2023-02-17 | End: 2023-02-17 | Stop reason: HOSPADM

## 2023-02-17 RX ORDER — BUPIVACAINE HYDROCHLORIDE 2.5 MG/ML
INJECTION, SOLUTION INFILTRATION; PERINEURAL PRN
Status: DISCONTINUED | OUTPATIENT
Start: 2023-02-17 | End: 2023-02-17 | Stop reason: HOSPADM

## 2023-02-17 RX ORDER — ACETAMINOPHEN 325 MG/1
975 TABLET ORAL ONCE
Status: COMPLETED | OUTPATIENT
Start: 2023-02-17 | End: 2023-02-17

## 2023-02-17 RX ORDER — CELECOXIB 200 MG/1
400 CAPSULE ORAL ONCE
Status: COMPLETED | OUTPATIENT
Start: 2023-02-17 | End: 2023-02-17

## 2023-02-17 RX ORDER — OXYCODONE HYDROCHLORIDE 10 MG/1
10 TABLET ORAL EVERY 4 HOURS PRN
Status: DISCONTINUED | OUTPATIENT
Start: 2023-02-17 | End: 2023-02-17

## 2023-02-17 RX ORDER — ONDANSETRON 4 MG/1
4 TABLET, ORALLY DISINTEGRATING ORAL EVERY 6 HOURS PRN
Status: DISCONTINUED | OUTPATIENT
Start: 2023-02-17 | End: 2023-02-18 | Stop reason: HOSPADM

## 2023-02-17 RX ORDER — PROCHLORPERAZINE MALEATE 5 MG
10 TABLET ORAL EVERY 6 HOURS PRN
Status: DISCONTINUED | OUTPATIENT
Start: 2023-02-17 | End: 2023-02-18 | Stop reason: HOSPADM

## 2023-02-17 RX ORDER — HALOPERIDOL 5 MG/ML
1 INJECTION INTRAMUSCULAR
Status: DISCONTINUED | OUTPATIENT
Start: 2023-02-17 | End: 2023-02-17 | Stop reason: HOSPADM

## 2023-02-17 RX ORDER — SODIUM CHLORIDE, SODIUM LACTATE, POTASSIUM CHLORIDE, CALCIUM CHLORIDE 600; 310; 30; 20 MG/100ML; MG/100ML; MG/100ML; MG/100ML
INJECTION, SOLUTION INTRAVENOUS CONTINUOUS PRN
Status: DISCONTINUED | OUTPATIENT
Start: 2023-02-17 | End: 2023-02-17

## 2023-02-17 RX ORDER — ACETAMINOPHEN 325 MG/1
975 TABLET ORAL EVERY 8 HOURS
Status: DISCONTINUED | OUTPATIENT
Start: 2023-02-17 | End: 2023-02-18 | Stop reason: HOSPADM

## 2023-02-17 RX ORDER — OXYCODONE HYDROCHLORIDE 5 MG/1
5 TABLET ORAL EVERY 4 HOURS PRN
Status: DISCONTINUED | OUTPATIENT
Start: 2023-02-17 | End: 2023-02-17 | Stop reason: HOSPADM

## 2023-02-17 RX ORDER — PROPOFOL 10 MG/ML
INJECTION, EMULSION INTRAVENOUS PRN
Status: DISCONTINUED | OUTPATIENT
Start: 2023-02-17 | End: 2023-02-17

## 2023-02-17 RX ORDER — FENTANYL CITRATE 50 UG/ML
INJECTION, SOLUTION INTRAMUSCULAR; INTRAVENOUS PRN
Status: DISCONTINUED | OUTPATIENT
Start: 2023-02-17 | End: 2023-02-17

## 2023-02-17 RX ADMIN — SUGAMMADEX 200 MG: 100 INJECTION, SOLUTION INTRAVENOUS at 10:17

## 2023-02-17 RX ADMIN — PHENYLEPHRINE HYDROCHLORIDE 100 MCG: 10 INJECTION INTRAVENOUS at 09:42

## 2023-02-17 RX ADMIN — PROPOFOL 200 MG: 10 INJECTION, EMULSION INTRAVENOUS at 07:39

## 2023-02-17 RX ADMIN — SODIUM CHLORIDE, POTASSIUM CHLORIDE, SODIUM LACTATE AND CALCIUM CHLORIDE: 600; 310; 30; 20 INJECTION, SOLUTION INTRAVENOUS at 11:11

## 2023-02-17 RX ADMIN — Medication 30 MG: at 08:19

## 2023-02-17 RX ADMIN — Medication 2.5 MG: at 23:08

## 2023-02-17 RX ADMIN — Medication 2 G: at 08:12

## 2023-02-17 RX ADMIN — FENTANYL CITRATE 50 MCG: 50 INJECTION, SOLUTION INTRAMUSCULAR; INTRAVENOUS at 08:32

## 2023-02-17 RX ADMIN — SENNOSIDES AND DOCUSATE SODIUM 1 TABLET: 50; 8.6 TABLET ORAL at 21:47

## 2023-02-17 RX ADMIN — OXYCODONE HYDROCHLORIDE 2.5 MG: 5 TABLET ORAL at 16:14

## 2023-02-17 RX ADMIN — DEXMEDETOMIDINE HYDROCHLORIDE 0.3 MCG/KG/HR: 100 INJECTION, SOLUTION INTRAVENOUS at 07:43

## 2023-02-17 RX ADMIN — ACETAMINOPHEN 975 MG: 325 TABLET ORAL at 18:51

## 2023-02-17 RX ADMIN — HYDROMORPHONE HYDROCHLORIDE 0.5 MG: 1 INJECTION, SOLUTION INTRAMUSCULAR; INTRAVENOUS; SUBCUTANEOUS at 08:55

## 2023-02-17 RX ADMIN — MAGNESIUM SULFATE IN WATER 2 G: 40 INJECTION, SOLUTION INTRAVENOUS at 11:10

## 2023-02-17 RX ADMIN — Medication 20 MG: at 09:02

## 2023-02-17 RX ADMIN — PHENYLEPHRINE HYDROCHLORIDE 100 MCG: 10 INJECTION INTRAVENOUS at 10:01

## 2023-02-17 RX ADMIN — LIDOCAINE HYDROCHLORIDE 100 MG: 20 INJECTION, SOLUTION INFILTRATION; PERINEURAL at 07:39

## 2023-02-17 RX ADMIN — FENTANYL CITRATE 50 MCG: 50 INJECTION, SOLUTION INTRAMUSCULAR; INTRAVENOUS at 08:43

## 2023-02-17 RX ADMIN — CELECOXIB 400 MG: 200 CAPSULE ORAL at 06:32

## 2023-02-17 RX ADMIN — SUGAMMADEX 100 MG: 100 INJECTION, SOLUTION INTRAVENOUS at 10:21

## 2023-02-17 RX ADMIN — Medication 50 MG: at 07:39

## 2023-02-17 RX ADMIN — FENTANYL CITRATE 100 MCG: 50 INJECTION, SOLUTION INTRAMUSCULAR; INTRAVENOUS at 07:39

## 2023-02-17 RX ADMIN — ONDANSETRON 4 MG: 2 INJECTION INTRAMUSCULAR; INTRAVENOUS at 10:10

## 2023-02-17 RX ADMIN — DEXAMETHASONE SODIUM PHOSPHATE 10 MG: 4 INJECTION, SOLUTION INTRA-ARTICULAR; INTRALESIONAL; INTRAMUSCULAR; INTRAVENOUS; SOFT TISSUE at 07:52

## 2023-02-17 RX ADMIN — PHENYLEPHRINE HYDROCHLORIDE 100 MCG: 10 INJECTION INTRAVENOUS at 07:50

## 2023-02-17 RX ADMIN — ACETAMINOPHEN 975 MG: 325 TABLET ORAL at 06:32

## 2023-02-17 RX ADMIN — SODIUM CHLORIDE, POTASSIUM CHLORIDE, SODIUM LACTATE AND CALCIUM CHLORIDE: 600; 310; 30; 20 INJECTION, SOLUTION INTRAVENOUS at 07:30

## 2023-02-17 RX ADMIN — HYDROMORPHONE HYDROCHLORIDE 0.5 MG: 1 INJECTION, SOLUTION INTRAMUSCULAR; INTRAVENOUS; SUBCUTANEOUS at 11:43

## 2023-02-17 RX ADMIN — PHENYLEPHRINE HYDROCHLORIDE 100 MCG: 10 INJECTION INTRAVENOUS at 09:52

## 2023-02-17 RX ADMIN — MIDAZOLAM 2 MG: 1 INJECTION INTRAMUSCULAR; INTRAVENOUS at 07:30

## 2023-02-17 RX ADMIN — ENOXAPARIN SODIUM 40 MG: 40 INJECTION SUBCUTANEOUS at 07:27

## 2023-02-17 RX ADMIN — PHENYLEPHRINE HYDROCHLORIDE 100 MCG: 10 INJECTION INTRAVENOUS at 10:13

## 2023-02-17 RX ADMIN — Medication 10 MG: at 09:32

## 2023-02-17 ASSESSMENT — ACTIVITIES OF DAILY LIVING (ADL)
ADLS_ACUITY_SCORE: 20

## 2023-02-17 ASSESSMENT — ENCOUNTER SYMPTOMS: SEIZURES: 0

## 2023-02-17 NOTE — BRIEF OP NOTE
[facility]    Brief Operative Note    Pre-operative diagnosis: Mediastinal mass [J98.59]  Post-operative diagnosis Same as pre-operative diagnosis    Procedure: Procedure(s):  Right anterior thoracotomy, biopsy mediastinal mass  Surgeon: Surgeon(s) and Role:     * Alexander Campoverde MD - Primary  Anesthesia: General   Estimated Blood Loss: Less than 10 ml    Drains: 24 Yi oneil drain in pleural space  Specimens:   ID Type Source Tests Collected by Time Destination   1 : Portion of Anterior Mediastinal Mass Tissue Mediastinum SURGICAL PATHOLOGY EXAM Alexander Campoverde MD 2/17/2023  9:14 AM    2 : Additional Portion of Mediastinal Mass  Tissue Mediastinum SURGICAL PATHOLOGY EXAM Alexander Campoverde MD 2/17/2023  9:23 AM      Findings:   None.  Complications: None.  Implants: * No implants in log *

## 2023-02-17 NOTE — ANESTHESIA PROCEDURE NOTES
Arterial Line Procedure Note    Pre-Procedure   Staff -        Anesthesiologist:  Lexie Estrada MD       Performed By: anesthesiologist       Location: OR       Pre-Anesthestic Checklist: patient identified, IV checked, risks and benefits discussed, informed consent, monitors and equipment checked, pre-op evaluation and at physician/surgeon's request  Timeout:       Correct Patient: Yes        Correct Procedure: Yes        Correct Site: Yes        Correct Position: Yes   Line Placement:   This line was placed Post Induction  Procedure   Procedure: arterial line       Laterality: left       Insertion Site: radial.  Sterile Prep        Skin prep: Chloraprep  Insertion/Injection        Catheter Type/Size: 20 G, 1.75 in/4.5 cm quick cath (integral wire)  Narrative        Tegaderm dressing used.       Complications: None apparent,        Arterial waveform: Yes        IBP within 10% of NIBP: Yes

## 2023-02-17 NOTE — ANESTHESIA PROCEDURE NOTES
Airway       Patient location during procedure: OR       Procedure Start/Stop Times: 2/17/2023 7:54 AM  Staff -        Anesthesiologist:  Lexie Estrada MD       CRNA: Thao Perez APRN CRNA       Other Anesthesia Staff: Denise Lemon       Performed By: SRNA and anesthesiologist  Consent for Airway        Urgency: elective  Indications and Patient Condition       Indications for airway management: hitesh-procedural       Induction type:intravenous       Mask difficulty assessment: 2 - vent by mask + OA or adjuvant +/- NMBA    Final Airway Details       Final airway type: endotracheal airway       Successful airway: ETT - double lumen left  Endotracheal Airway Details        Cuffed: yes       Inital cuff pressure (cm H2O): 6       Cuff volume (mL): 1       Successful intubation technique: video laryngoscopy       VL Blade Size: MAC 3       Grade View of Cords: 1 (on video screen, anterior)       Adjucts: stylet       Position: Center       Measured from: gums/teeth       Secured at (cm): 31       Bite block used: None       ETT Double lumen (fr): 39    Post intubation assessment        Placement verified by: capnometry, equal breath sounds and chest rise        Number of attempts at approach: 2       Number of other approaches attempted: 1       Secured with: pink tape       Ease of procedure: easy       Dentition: Intact and Unchanged    Medication(s) Administered   Medication Administration Time: 2/17/2023 7:54 AM    Additional Comments       First attempt by DL with MAC 3 blade grade 2b view. Switched to VL MAC 3 for successful placement of double lumen tube

## 2023-02-17 NOTE — ANESTHESIA CARE TRANSFER NOTE
Patient: Guero Collins    Procedure: Procedure(s):  Right anterior thoracotomy, biopsy mediastinal mass       Diagnosis: Mediastinal mass [J98.59]  Diagnosis Additional Information: No value filed.    Anesthesia Type:   General     Note:    Oropharynx: oropharynx clear of all foreign objects and spontaneously breathing  Level of Consciousness: awake  Oxygen Supplementation: nasal cannula  Level of Supplemental Oxygen (L/min / FiO2): 3  Independent Airway: airway patency satisfactory and stable  Dentition: dentition unchanged  Vital Signs Stable: post-procedure vital signs reviewed and stable  Report to RN Given: handoff report given  Patient transferred to: PACU  Comments: VSS, report given to RN.    Handoff Report: Identifed the Patient, Identified the Reponsible Provider, Reviewed the pertinent medical history, Discussed the surgical course, Reviewed Intra-OP anesthesia mangement and issues during anesthesia, Set expectations for post-procedure period and Allowed opportunity for questions and acknowledgement of understanding      Vitals:  Vitals Value Taken Time   /58    Temp     Pulse 78    Resp 12    SpO2 97        Electronically Signed By: MEENU Gunderson CRNA  February 17, 2023  10:32 AM

## 2023-02-17 NOTE — OP NOTE
Preoperative diagnosis:                         Anterior mediastinal mass  Postoperative diagnosis:                       as above    Procedure:   1. 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     Description of procedure The patient was brought to the room and placed supine upon the table.  After confirming the patient's identity and verifying the consent, appropriate monitoring devices were placed, as well as SCD boots. General anesthesia was administered.  The patient was intubated with a double-lumen endotracheal tube.  Proper position was confirmed by fiberoptic bronchoscopy.   Intravenous antibiotic was administered within 1 hour prior to the incision. The patient remained supine, a shoulder roll was placed and the patient was secured to the table. The right chest was prepped with chlorhexidine and  draped in the standard surgical fashion.      A right inframammary incision was made and the pectoralis was reflected up. An Randell wound retractor was placed to hold open the soft tissues. We removed the anterior portion of the 5th rib. The mass was easily located and portions were removed. The frozen section was consistent with possible thymoma. A fresh specimen will be analyzed for lymphoma. Hemostasis was noted and SNOW was left by the mass. A 24 Pashto Don chest tube was placed going to the base through stab incision. There were adhesions preventing the tube going to the apex. The tube was secured to the skin with 0 silk suture. The muscle was reapproximated using 0 Vicryl.  The fascia was closed with   2-0 Vicryl and then 3-0 vicryl and 4-0 vicryl. The  areas were cleaned and dried and a sterile dressing was applied.  At the end of the case, sponge, needle, and instrument counts were correct.

## 2023-02-17 NOTE — ANESTHESIA POSTPROCEDURE EVALUATION
Patient: Guero Collins    Procedure: Procedure(s):  Right anterior thoracotomy, biopsy mediastinal mass       Anesthesia Type:  General    Note:  Disposition: Admission   Postop Pain Control: Uneventful            Sign Out: Well controlled pain   PONV: No   Neuro/Psych: Uneventful            Sign Out: Acceptable/Baseline neuro status   Airway/Respiratory: Uneventful            Sign Out: Acceptable/Baseline resp. status   CV/Hemodynamics: Uneventful            Sign Out: Acceptable CV status; No obvious hypovolemia; No obvious fluid overload   Other NRE: NONE   DID A NON-ROUTINE EVENT OCCUR? No           Last vitals:  Vitals Value Taken Time   /53 02/17/23 1200   Temp 36.8  C (98.2  F) 02/17/23 1030   Pulse 69 02/17/23 1203   Resp 13 02/17/23 1203   SpO2 96 % 02/17/23 1203   Vitals shown include unvalidated device data.    Electronically Signed By: Josie Villagran MD  February 17, 2023  12:04 PM

## 2023-02-18 ENCOUNTER — APPOINTMENT (OUTPATIENT)
Dept: GENERAL RADIOLOGY | Facility: CLINIC | Age: 28
DRG: 168 | End: 2023-02-18
Attending: THORACIC SURGERY (CARDIOTHORACIC VASCULAR SURGERY)
Payer: COMMERCIAL

## 2023-02-18 VITALS
SYSTOLIC BLOOD PRESSURE: 123 MMHG | BODY MASS INDEX: 32.98 KG/M2 | RESPIRATION RATE: 17 BRPM | DIASTOLIC BLOOD PRESSURE: 51 MMHG | WEIGHT: 217.59 LBS | OXYGEN SATURATION: 97 % | HEART RATE: 96 BPM | HEIGHT: 68 IN | TEMPERATURE: 98.2 F

## 2023-02-18 LAB
ANION GAP SERPL CALCULATED.3IONS-SCNC: 12 MMOL/L (ref 7–15)
BUN SERPL-MCNC: 11.4 MG/DL (ref 6–20)
CALCIUM SERPL-MCNC: 9.2 MG/DL (ref 8.6–10)
CHLORIDE SERPL-SCNC: 104 MMOL/L (ref 98–107)
CREAT SERPL-MCNC: 0.81 MG/DL (ref 0.67–1.17)
DEPRECATED HCO3 PLAS-SCNC: 25 MMOL/L (ref 22–29)
ERYTHROCYTE [DISTWIDTH] IN BLOOD BY AUTOMATED COUNT: 13.8 % (ref 10–15)
GFR SERPL CREATININE-BSD FRML MDRD: >90 ML/MIN/1.73M2
GLUCOSE BLDC GLUCOMTR-MCNC: 142 MG/DL (ref 70–99)
GLUCOSE SERPL-MCNC: 117 MG/DL (ref 70–99)
HCT VFR BLD AUTO: 41.9 % (ref 40–53)
HGB BLD-MCNC: 13.3 G/DL (ref 13.3–17.7)
MCH RBC QN AUTO: 29.2 PG (ref 26.5–33)
MCHC RBC AUTO-ENTMCNC: 31.7 G/DL (ref 31.5–36.5)
MCV RBC AUTO: 92 FL (ref 78–100)
PLATELET # BLD AUTO: 449 10E3/UL (ref 150–450)
POTASSIUM SERPL-SCNC: 4 MMOL/L (ref 3.4–5.3)
RBC # BLD AUTO: 4.56 10E6/UL (ref 4.4–5.9)
SODIUM SERPL-SCNC: 141 MMOL/L (ref 136–145)
WBC # BLD AUTO: 14.9 10E3/UL (ref 4–11)

## 2023-02-18 PROCEDURE — 85027 COMPLETE CBC AUTOMATED: CPT | Performed by: STUDENT IN AN ORGANIZED HEALTH CARE EDUCATION/TRAINING PROGRAM

## 2023-02-18 PROCEDURE — 71045 X-RAY EXAM CHEST 1 VIEW: CPT | Mod: 26 | Performed by: RADIOLOGY

## 2023-02-18 PROCEDURE — 250N000011 HC RX IP 250 OP 636: Performed by: STUDENT IN AN ORGANIZED HEALTH CARE EDUCATION/TRAINING PROGRAM

## 2023-02-18 PROCEDURE — 71045 X-RAY EXAM CHEST 1 VIEW: CPT | Mod: 77

## 2023-02-18 PROCEDURE — 250N000013 HC RX MED GY IP 250 OP 250 PS 637: Performed by: STUDENT IN AN ORGANIZED HEALTH CARE EDUCATION/TRAINING PROGRAM

## 2023-02-18 PROCEDURE — 71045 X-RAY EXAM CHEST 1 VIEW: CPT

## 2023-02-18 PROCEDURE — 36415 COLL VENOUS BLD VENIPUNCTURE: CPT | Performed by: STUDENT IN AN ORGANIZED HEALTH CARE EDUCATION/TRAINING PROGRAM

## 2023-02-18 PROCEDURE — 250N000013 HC RX MED GY IP 250 OP 250 PS 637

## 2023-02-18 PROCEDURE — 82310 ASSAY OF CALCIUM: CPT | Performed by: STUDENT IN AN ORGANIZED HEALTH CARE EDUCATION/TRAINING PROGRAM

## 2023-02-18 RX ORDER — AMOXICILLIN 250 MG
1 CAPSULE ORAL 2 TIMES DAILY
Qty: 60 TABLET | Refills: 0 | Status: SHIPPED | OUTPATIENT
Start: 2023-02-18 | End: 2023-03-15

## 2023-02-18 RX ORDER — POLYETHYLENE GLYCOL 3350 17 G/17G
17 POWDER, FOR SOLUTION ORAL DAILY
Qty: 510 G | Refills: 0 | Status: SHIPPED | OUTPATIENT
Start: 2023-02-18 | End: 2023-03-15

## 2023-02-18 RX ORDER — OXYCODONE HYDROCHLORIDE 5 MG/1
2.5 TABLET ORAL EVERY 4 HOURS PRN
Qty: 10 TABLET | Refills: 0 | Status: SHIPPED | OUTPATIENT
Start: 2023-02-18 | End: 2023-03-15

## 2023-02-18 RX ORDER — ACETAMINOPHEN 325 MG/1
975 TABLET ORAL EVERY 8 HOURS PRN
Qty: 60 TABLET | Refills: 0 | Status: SHIPPED | OUTPATIENT
Start: 2023-02-18 | End: 2023-03-15

## 2023-02-18 RX ORDER — ENOXAPARIN SODIUM 100 MG/ML
40 INJECTION SUBCUTANEOUS EVERY 24 HOURS
Status: DISCONTINUED | OUTPATIENT
Start: 2023-02-18 | End: 2023-02-18 | Stop reason: HOSPADM

## 2023-02-18 RX ORDER — METHOCARBAMOL 750 MG/1
750 TABLET, FILM COATED ORAL EVERY 6 HOURS PRN
Qty: 20 TABLET | Refills: 0 | Status: SHIPPED | OUTPATIENT
Start: 2023-02-18 | End: 2023-04-04

## 2023-02-18 RX ADMIN — METHOCARBAMOL 750 MG: 750 TABLET ORAL at 06:21

## 2023-02-18 RX ADMIN — POLYETHYLENE GLYCOL 3350 17 G: 17 POWDER, FOR SOLUTION ORAL at 08:08

## 2023-02-18 RX ADMIN — ENOXAPARIN SODIUM 40 MG: 40 INJECTION SUBCUTANEOUS at 09:07

## 2023-02-18 RX ADMIN — ACETAMINOPHEN 975 MG: 325 TABLET ORAL at 09:08

## 2023-02-18 RX ADMIN — SENNOSIDES AND DOCUSATE SODIUM 1 TABLET: 50; 8.6 TABLET ORAL at 08:09

## 2023-02-18 RX ADMIN — OXYCODONE HYDROCHLORIDE 2.5 MG: 5 TABLET ORAL at 08:58

## 2023-02-18 RX ADMIN — Medication 2.5 MG: at 09:09

## 2023-02-18 RX ADMIN — ACETAMINOPHEN 975 MG: 325 TABLET ORAL at 02:53

## 2023-02-18 ASSESSMENT — ACTIVITIES OF DAILY LIVING (ADL)
ADLS_ACUITY_SCORE: 20

## 2023-02-18 NOTE — PLAN OF CARE
"Goal Outcome Evaluation:    Plan of Care Reviewed With: patient  Overall Patient Progress: improvingOverall Patient Progress: improving.  /51   Pulse 96   Temp 98.2  F (36.8  C) (Oral)   Resp 17   Ht 1.727 m (5' 8\")   Wt 98.7 kg (217 lb 9.5 oz)   SpO2 97%   BMI 33.09 kg/m     Sating high 90s at ra.  No cough. Using IS to 1500Ml.   Lungs sounds decreased.     Neuro: Alert and oriented x4.     GI/: WDL.     Diet: Regular diet. Good po intake. No co nausea.      Incisions/Drains: CT removed at 1100. Post removal CXR done at 1200.     IV Access: PIV x 2 - removed.     Labs: Reviewed.     Activity: Up walking to BR and with family on unit.     Pain: Oxycodone 5mg given x1. On scheduled tylenol.     New changes this shift: Discussed discharge orders and follow up appointments with pt. Print out of AVS given at discharge. Rx ready for  in discharge pharmacy.     Plan: Discharge home this afternoon.            "

## 2023-02-18 NOTE — PLAN OF CARE
[id number]  POD#0 R anterior thoracotomy with mediastinal biopsy. R chest incision covered with primipore. R chest tube to water seal. Pt reports pain is 4/10, had tylenol. Tolerating po. PIV SL'd. VSS. On 1L NC to keep sats above 92%. Walked in halls with SBA.     Admitted/transferred from: Pacu  2 RN full   skin assessment completed by Nery Guido RN and Lexie DANIELLE  Skin assessment finding: skin intact, no problems other than surgical incisions  Interventions/actions: skin interventions educated on repositioning

## 2023-02-18 NOTE — PLAN OF CARE
Neuro: A/O/4, CMS intact  Respiratory: RA,  occasional cough, denies SOB  Cardiac: VSS  GI/: Voiding, passing gas  Diet: Regular, no nausea  Pain: rates 2-4/10 incision managed with scheduled tylenol and prn oxycodone.   Incisions/Drains: CT to H2O seal, no air leak, Right chest incision drg C/D/I  IV Access: PIV to each hand, SL  Labs: reviewed  Activity: SBA, to help manage CT  New changes this shift: Pt stated he would prefer to only take 2.5mg of oxycodone. Order to change dose was done.   Plan: follow POC   Previously Declined (within the last year)

## 2023-02-18 NOTE — PHARMACY-ADMISSION MEDICATION HISTORY
Admission Medication History Completed by Pharmacy    See [facility] Admission Navigator for allergy information, preferred outpatient pharmacy, prior to admission medications and immunization status.     Medication History Sources:     Fill hx, patient interview     Changes made to PTA medication list (reason):    Added: None    Deleted: None    Changed: None    Additional Information:    Patient confirms he takes no OTC or prescription meds.    Prior to Admission medications    Not on File       Date completed: 02/17/23    Medication history completed by: Baljit HooperD

## 2023-02-18 NOTE — DISCHARGE SUMMARY
NAME: Guero Collins   MRN: [id number]   : 1995     DATE OF ADMISSION: 2023     PRE/POSTOPERATIVE DIAGNOSES: anterior mediastinal mass    PROCEDURES PERFORMED: Right anterior thoracotomy, biopsy mediastinal mass    PATHOLOGY RESULTS: Pending     CULTURE RESULTS: None     INTRAOPERATIVE COMPLICATIONS: None     POSTOPERATIVE COMPLICATIONS: None     DRAINS/TUBES PRESENT AT DISCHARGE: none    DATE OF DISCHARGE:  23    HOSPITAL COURSE: Guero Collins is a 27 year old male who on 2023  underwent the above-named procedures.  He tolerated the operation well and postoperatively was transferred to the general post-surgical unit. He had a right sided chest tube placed intra-operatively that drained an effusion which was present pre-operatively. The tube was removed post op day 1. The remainder of his course was essentially uncomplicated.  Prior to discharge, his pain was controlled well, he was able to perform ADLs and ambulate independently without difficulty, and had full return of bowel and bladder function.  On , he was discharged to home in stable condition.    Discharge Exam  AOx3 NAD  NLB on RA  Warm and well perfused    Incisions CDI with dressing in place over chest tube site    DISCHARGE INSTRUCTIONS:  Discharge Procedure Orders   Reason for your hospital stay   Order Comments: You were hospitalized for surgery to biopsy a mass in your mediastinum (chest). You had a portion of one rib removed to do so  You will be called with the results of the biopsy in 3-5 days     Activity   Order Comments: Your activity upon discharge: activity as tolerated. Avoid impact to your right chest for 6 weeks, no contact sports for 6 weeks     Order Specific Question Answer Comments   Is discharge order? Yes      Discharge Instructions   Order Comments: THORACIC SURGERY DISCHARGE INSTRUCTIONS    If your plans upon discharge include prolonged periods of sitting (i.e a lengthy car or plane ride), it is highly  "beneficial to get up and walk at least once per hour to help prevent swelling and blood clots.     You may remove chest tube dressing 48 hours after tube removal and bandage the site at your own discretion thereafter.  Small amounts of leakage are normal for 2-3 days after removal.  Feel free to call with questions.    You may get incision wet 2 days after operation. Do not submerge, soak, or scrub incision or swim until seen in follow-up.    Take incentive spirometer home for continued frequent use    Activity as tolerated, no strenous activity until seen in follow-up, no lifting greater than 20 pounds for the next 4 weeks.    Stay hydrated. Take over the counter fiber (metamucil or benefiber) and stool softeners (Miralax, docusate or senna) if becoming constipated.     Call for fever greater than 101.5, chills, increased size of incision, red skin around incision, vision changes, muscle strength changes, sensation changes, shortness of breath, or other concerns.    No driving while taking narcotic pain medication.    Transition to ibuprofen or tylenol/acetaminophen for pain control. Do not take tylenol/acetaminophen and acetaminophen containing narcotic (e.g., percocet or vicodin) at the same time. If you have known ulcer problems, or kidney trouble (elevated creatinine) do not take the ibuprofen.    In emergencies, call 911    For other Questions or Concerns;   A.) During weekday working hours (Monday through Friday 8am to 4:30pm)   call [phone] and ask to speak to a clinical nurse specialist.     B.) At nights (after 4:30pm), on weekends, or if urgent call [phone] and   tell the  \"I would like to page job code [phone], the thoracic surgery   fellow on call, please.\"     Follow Up ([facility])   Order Comments: Follow up on March 17th with a chest xray as follows:    XR CHEST 2 VIEWS   12:45 PM  (20 min.)  [address] [facility]  Xray [place]    RETURN CCSL   2:00 " PM  (45 min.)  Jazzmine Murphy APRN CNS  [facility]  Cancer Clinic    Appointments on [place] and/or [facility] (with [facility] or [facility] provider or service). Call [phone] if you haven't heard regarding these appointments within 7 days of discharge.     Diet   Order Comments: Follow this diet upon discharge:       Regular Diet Adult     Order Specific Question Answer Comments   Is discharge order? Yes        DISCHARGE MEDICATIONS:      Medication List      Started    acetaminophen 325 MG tablet  Commonly known as: TYLENOL  975 mg, Oral, EVERY 8 HOURS PRN     methocarbamol 750 MG tablet  Commonly known as: ROBAXIN  750 mg, Oral, EVERY 6 HOURS PRN     oxyCODONE 5 MG tablet  Commonly known as: ROXICODONE  2.5 mg, Oral, EVERY 4 HOURS PRN     polyethylene glycol 17 GM/Dose powder  Commonly known as: MIRALAX  17 g, Oral, DAILY     senna-docusate 8.6-50 MG tablet  Commonly known as: SENOKOT-S/PERICOLACE  1 tablet, Oral, 2 TIMES DAILY               Yessenia Schuster MD  General Surgery PGY3

## 2023-02-20 ENCOUNTER — PATIENT OUTREACH (OUTPATIENT)
Dept: CARE COORDINATION | Facility: CLINIC | Age: 28
End: 2023-02-20
Payer: COMMERCIAL

## 2023-02-20 NOTE — PROGRESS NOTES
"Clinic Care Coordination Contact  [facility]: Post-Discharge Note  SITUATION                                                      Admission:    Admission Date: 02/17/23   Reason for Admission: Anterior mediastinal mass  Discharge:   Discharge Date: 02/18/23  Discharge Diagnosis: Anterior mediastinal mass    BACKGROUND                                                      Per hospital discharge summary and inpatient provider notes:    HOSPITAL COURSE: Guero Collins is a 27 year old male who on 2/17/2023  underwent the above-named procedures.  He tolerated the operation well and postoperatively was transferred to the general post-surgical unit. He had a right sided chest tube placed intra-operatively that drained an effusion which was present pre-operatively. The tube was removed post op day 1. The remainder of his course was essentially uncomplicated.  Prior to discharge, his pain was controlled well, he was able to perform ADLs and ambulate independently without difficulty, and had full return of bowel and bladder function.  On 2/18, he was discharged to home in stable condition.    ASSESSMENT           Discharge Assessment  How are you doing now that you are home?: \"I'm doing okay, pain controlled with scheduled medications.\"  Pt asked if he could move up his scheduled oncology follow up appt on 3/17/23, he will send a [employer] message to care team to inquire on this as well as some questions he has regarding traveling after surgery.  How are your symptoms? (Red Flag symptoms escalate to triage hotline per guidelines): Improved  Do you feel your condition is stable enough to be safe at home until your provider visit?: Yes  Does the patient have their discharge instructions? : Yes  Does the patient have questions regarding their discharge instructions? : No  Were you started on any new medications or were there changes to any of your previous medications? : Yes  Does the patient have all of their medications?: " "Yes  Do you have questions regarding any of your medications? : No  Do you have all of your needed medical supplies or equipment (DME)?  (i.e. oxygen tank, CPAP, cane, etc.): Yes  Discharge follow-up appointment scheduled within 14 calendar days? : No  Is patient agreeable to assistance with scheduling? : No (Pt scheduled for oncology follow up appt on 3/17/23 per AVS)         Post-op (Clinicians Only)  Did the patient have surgery or a procedure: Yes (s/p Right anterior thoracotomy, biopsy mediastinal mass)  Incision: closed (bandage still in place)  Drainage: No  Bleeding: light (small amount of blood/drainage from chest tube site on dressing.)  Fever: No  Chills: No  Redness: No  Warmth: No  Swelling: No  Incision site pain: No (More generalized surgical pain off/on, \"later in the day yesterday had a very sharp pain that went away right away, weird feeling again and went away, happened maybe 3x in a row but not super major, and hasn't happened since.\")  Eating & Drinking: eating and drinking without complaints/concerns  PO Intake: regular diet  Additional Symptoms:  (Pt denies)  Bowel Function: normal  Date of last BM: 02/20/23  Urinary Status: voiding without complaint/concerns        PLAN                                                      Outpatient Plan:      You were hospitalized for surgery to biopsy a mass in your mediastinum (chest). You had a portion of one rib  removed to do so  You will be called with the results of the biopsy in 3-5 days    Follow up on March 17th with a chest xray as follows:  XR CHEST 2 VIEWS  12:45 PM  (20 min.)  [id number] [facility]  Xray [place]    RETURN CCSL  2:00 PM  (45 min.)  Jazzmine Murphy APRN CNS  [facility]  Cancer Clinic    Future Appointments   Date Time Provider Department Center   3/17/2023  1:00 PM [id number]   3/17/2023  2:15 PM Jazzmine Murphy APRN CNS [facility]         For any " urgent concerns, please contact our 24 hour nurse triage line: [phone] ([phone])         Essence Noyola RN

## 2023-03-02 LAB
LAB DIRECTOR COMMENTS: NORMAL
LAB DIRECTOR DISCLAIMER: NORMAL
LAB DIRECTOR INTERPRETATION: NORMAL
LAB DIRECTOR METHODOLOGY: NORMAL
LAB DIRECTOR RESULTS: NORMAL
SPECIMEN DESCRIPTION: NORMAL

## 2023-03-03 ENCOUNTER — LAB (OUTPATIENT)
Dept: LAB | Facility: CLINIC | Age: 28
End: 2023-03-03
Payer: COMMERCIAL

## 2023-03-03 DIAGNOSIS — J98.59 MEDIASTINAL MASS: Primary | ICD-10-CM

## 2023-03-03 PROCEDURE — 81342 TRG GENE REARRANGEMENT ANAL: CPT | Performed by: THORACIC SURGERY (CARDIOTHORACIC VASCULAR SURGERY)

## 2023-03-03 PROCEDURE — G0452 MOLECULAR PATHOLOGY INTERPR: HCPCS | Mod: 26 | Performed by: PATHOLOGY

## 2023-03-06 ENCOUNTER — LAB (OUTPATIENT)
Dept: LAB | Facility: CLINIC | Age: 28
End: 2023-03-06
Payer: COMMERCIAL

## 2023-03-06 DIAGNOSIS — J98.59 MEDIASTINAL MASS: Primary | ICD-10-CM

## 2023-03-06 PROCEDURE — G0452 MOLECULAR PATHOLOGY INTERPR: HCPCS | Mod: 26 | Performed by: PATHOLOGY

## 2023-03-06 PROCEDURE — 81342 TRG GENE REARRANGEMENT ANAL: CPT | Performed by: THORACIC SURGERY (CARDIOTHORACIC VASCULAR SURGERY)

## 2023-03-06 NOTE — PROGRESS NOTES
THORACIC SURGERY FOLLOW UP VISIT     I saw Guero Collins in follow-up today. The clinical summary follows:      PREOP DIAGNOSIS   Thymic mass     PROCEDURE   Right anterior thoracotomy, biopsy mediastinal mass     DATE OF PROCEDURE  2/17/2023     HISTOPATHOLOGY   Atypical aggregates of T-lineage cells in a background of extensive fibrosis and necrosis    This biopsy contains fragments of viable lymphoid tissue, which is composed of T-lineage cells with a maturing T-cell/thymocyte immunophenotype and shows an overall lobular architecture, including epithelial meshworks. These features, in conjunction with the flow cytometry findings, could be compatible with benign thymic tissue.     However, the presence of extensive necrosis and fibrosis raises concern for a neoplastic process such as T-lymphoblastic lymphoma (or another malignancy). Necrotic tissue cannot be reliably evaluated morphologically or by immunostains or flow cytometry. Thus, although this biopsy is not diagnostic of malignancy, the overall findings are worrisome and additional workup is recommended.     To further assess this biopsy, T-cell gene rearrangement studies will be attempted to evaluate for a clonal process. Note that testing may be limited by poor biopsy viability. The results will be reported separately and integrated into an addendum to this report.     To further evaluate the patient, recommend CBC with differential, peripheral blood flow cytometry, and (if indicated) bone marrow biopsy. If the patient's pleural effusion is re-sampled, please send for flow cytometry and cell count and differential. Should the above workup prove inconclusive, resection of the mass may be helpful. Referral to hematology/oncology may be beneficial for follow-up and coordination      CXR (3/7/2023):     Pleural effusion likely postop change     CT-guided biopsy 1/9/2023:  Scant necrotic tissue with clusters of lymphoid and fibrous tissue -  "nondiagnostic        MR Brain 1/7/2023:   Normal     Testicular ultrasound 1/7/2023:  Normal     CT chest 1/6/2023:    9.7 x 6.8 x 6.5 cm     Labs 1/6/2023:  Beta-HCG:  Normal  AFP:  Normal  LDH: 663    /75 (BP Location: Right arm, Patient Position: Chair, Cuff Size: Adult Large)   Pulse 70   Temp 98  F (36.7  C)   Resp 16   Ht 1.727 m (5' 7.99\")   Wt 99.9 kg (220 lb 3.2 oz)   SpO2 99%   BMI 33.49 kg/m         SUBJECTIVE   Guero Collins has no pain and is recovering well.     From a personal perspective, he is here with his wife. 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      IMPRESSION   (J98.59) Mediastinal mass  (primary encounter diagnosis)    Guero Collins is a 27 year old man who has recovered well after undergoing a  right anterior thoracotomy, biopsy mediastinal mass on 2/17/2023. The differential includes lymphoma or thymoma.     PLAN  I spent 15 min on the date of the encounter in chart review, patient visit, review of tests, documentation and/or discussion with other providers about the issues documented above. I reviewed the plan as follows:    Based on today's tumor board discussion, Mr. Collins will be evaluated tomorrow by the heme oncologists for further workup of possible lymphoma. If the workup favors thymoma, I will plan on resection.        All questions were answered and Guero Collins and present family were in agreement with the plan.    I appreciate the opportunity to participate in the care of your patient and will keep you updated.      Alexander Campoverde MD   "

## 2023-03-07 ENCOUNTER — ANCILLARY PROCEDURE (OUTPATIENT)
Dept: GENERAL RADIOLOGY | Facility: CLINIC | Age: 28
End: 2023-03-07
Attending: THORACIC SURGERY (CARDIOTHORACIC VASCULAR SURGERY)
Payer: COMMERCIAL

## 2023-03-07 ENCOUNTER — ONCOLOGY VISIT (OUTPATIENT)
Dept: SURGERY | Facility: CLINIC | Age: 28
End: 2023-03-07
Attending: THORACIC SURGERY (CARDIOTHORACIC VASCULAR SURGERY)
Payer: COMMERCIAL

## 2023-03-07 VITALS
RESPIRATION RATE: 16 BRPM | HEIGHT: 68 IN | BODY MASS INDEX: 33.37 KG/M2 | OXYGEN SATURATION: 99 % | SYSTOLIC BLOOD PRESSURE: 127 MMHG | WEIGHT: 220.2 LBS | TEMPERATURE: 98 F | HEART RATE: 70 BPM | DIASTOLIC BLOOD PRESSURE: 75 MMHG

## 2023-03-07 DIAGNOSIS — J98.59 MEDIASTINAL MASS: Primary | ICD-10-CM

## 2023-03-07 DIAGNOSIS — J98.59 MEDIASTINAL MASS: ICD-10-CM

## 2023-03-07 PROCEDURE — 71046 X-RAY EXAM CHEST 2 VIEWS: CPT | Performed by: RADIOLOGY

## 2023-03-07 PROCEDURE — 99024 POSTOP FOLLOW-UP VISIT: CPT | Performed by: THORACIC SURGERY (CARDIOTHORACIC VASCULAR SURGERY)

## 2023-03-07 PROCEDURE — G0463 HOSPITAL OUTPT CLINIC VISIT: HCPCS | Performed by: THORACIC SURGERY (CARDIOTHORACIC VASCULAR SURGERY)

## 2023-03-07 RX ORDER — MULTIPLE VITAMINS W/ MINERALS TAB 9MG-400MCG
1 TAB ORAL DAILY
COMMUNITY
End: 2024-09-11

## 2023-03-07 ASSESSMENT — PAIN SCALES - GENERAL: PAINLEVEL: NO PAIN (0)

## 2023-03-07 NOTE — LETTER
Date:March 8, 2023      Provider requested that no letter be sent. Do not send.       [facility]

## 2023-03-07 NOTE — LETTER
3/7/2023         RE: Guero Collins  [address] [facility] [address]        Dear Colleague,    Thank you for referring your patient, Guero Collins, to the [facility]. Please see a copy of my visit note below.    THORACIC SURGERY FOLLOW UP VISIT     I saw Guero Collins in follow-up today. The clinical summary follows:      PREOP DIAGNOSIS   Thymic mass     PROCEDURE   Right anterior thoracotomy, biopsy mediastinal mass     DATE OF PROCEDURE  2/17/2023     HISTOPATHOLOGY   Atypical aggregates of T-lineage cells in a background of extensive fibrosis and necrosis    This biopsy contains fragments of viable lymphoid tissue, which is composed of T-lineage cells with a maturing T-cell/thymocyte immunophenotype and shows an overall lobular architecture, including epithelial meshworks. These features, in conjunction with the flow cytometry findings, could be compatible with benign thymic tissue.     However, the presence of extensive necrosis and fibrosis raises concern for a neoplastic process such as T-lymphoblastic lymphoma (or another malignancy). Necrotic tissue cannot be reliably evaluated morphologically or by immunostains or flow cytometry. Thus, although this biopsy is not diagnostic of malignancy, the overall findings are worrisome and additional workup is recommended.     To further assess this biopsy, T-cell gene rearrangement studies will be attempted to evaluate for a clonal process. Note that testing may be limited by poor biopsy viability. The results will be reported separately and integrated into an addendum to this report.     To further evaluate the patient, recommend CBC with differential, peripheral blood flow cytometry, and (if indicated) bone marrow biopsy. If the patient's pleural effusion is re-sampled, please send for flow cytometry and cell count and differential. Should the above workup prove inconclusive, resection of the mass may be helpful. Referral to  "hematology/oncology may be beneficial for follow-up and coordination      CXR (3/7/2023):     Pleural effusion likely postop change     CT-guided biopsy 1/9/2023:  Scant necrotic tissue with clusters of lymphoid and fibrous tissue - nondiagnostic        MR Brain 1/7/2023:   Normal     Testicular ultrasound 1/7/2023:  Normal     CT chest 1/6/2023:    9.7 x 6.8 x 6.5 cm     Labs 1/6/2023:  Beta-HCG:  Normal  AFP:  Normal  LDH: 663    /75 (BP Location: Right arm, Patient Position: Chair, Cuff Size: Adult Large)   Pulse 70   Temp 98  F (36.7  C)   Resp 16   Ht 1.727 m (5' 7.99\")   Wt 99.9 kg (220 lb 3.2 oz)   SpO2 99%   BMI 33.49 kg/m         SUBJECTIVE   Guero Collins has no pain and is recovering well.     From a personal perspective, he is here with his wife. 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      IMPRESSION   (J98.59) Mediastinal mass  (primary encounter diagnosis)    Guero Collins is a 27 year old man who has recovered well after undergoing a  right anterior thoracotomy, biopsy mediastinal mass on 2/17/2023. The differential includes lymphoma or thymoma.     PLAN  I spent 15 min on the date of the encounter in chart review, patient visit, review of tests, documentation and/or discussion with other providers about the issues documented above. I reviewed the plan as follows:    Based on today's tumor board discussion, Mr. Collins will be evaluated tomorrow by the heme oncologists for further workup of possible lymphoma. If the workup favors thymoma, I will plan on resection.        All questions were answered and Guero Collins and present family were in agreement with the plan.    I appreciate the opportunity to participate in the care of your patient and will keep you updated.      Alexander Campoverde MD       Again, thank you for allowing me to participate in the care of your patient.        Sincerely,        Alexander Campoverde MD    "

## 2023-03-07 NOTE — NURSING NOTE
"Oncology Rooming Note    March 7, 2023 2:48 PM   Guero Collins is a 27 year old male who presents for:    Chief Complaint   Patient presents with     Oncology Clinic Visit     [facility] RETURN - MEDIASTINAL MASS     Initial Vitals: /75 (BP Location: Right arm, Patient Position: Chair, Cuff Size: Adult Large)   Pulse 70   Temp 98  F (36.7  C)   Resp 16   Ht 1.727 m (5' 7.99\")   Wt 99.9 kg (220 lb 3.2 oz)   SpO2 99%   BMI 33.49 kg/m   Estimated body mass index is 33.49 kg/m  as calculated from the following:    Height as of this encounter: 1.727 m (5' 7.99\").    Weight as of this encounter: 99.9 kg (220 lb 3.2 oz). Body surface area is 2.19 meters squared.  No Pain (0) Comment: Data Unavailable   No LMP for male patient.  Allergies reviewed: Yes  Medications reviewed: Yes    Medications: Medication refills not needed today.  Pharmacy name entered into [employer]:    [id number] - [place], [place] - [address] [facility]  [facility] [id number] [place] [facility] [address]            "

## 2023-03-08 ENCOUNTER — PATIENT OUTREACH (OUTPATIENT)
Dept: ONCOLOGY | Facility: CLINIC | Age: 28
End: 2023-03-08
Payer: COMMERCIAL

## 2023-03-08 ENCOUNTER — TELEPHONE (OUTPATIENT)
Dept: ONCOLOGY | Facility: CLINIC | Age: 28
End: 2023-03-08
Payer: COMMERCIAL

## 2023-03-08 DIAGNOSIS — J98.59 MEDIASTINAL MASS: Primary | ICD-10-CM

## 2023-03-08 DIAGNOSIS — C85.92 LYMPHOMA OF INTRATHORACIC LYMPH NODES, UNSPECIFIED LYMPHOMA TYPE (H): ICD-10-CM

## 2023-03-08 DIAGNOSIS — Z11.59 SCREENING FOR VIRAL DISEASE: ICD-10-CM

## 2023-03-08 NOTE — PROGRESS NOTES
Notified pt that we are working on the appts for consult with [facility] lymphoma team, discussed with Dr Campoverde by phone yesterday. Will arrange pre-consult tests to include lab draw and possible bmbx if MD orders it pre-consult. Active listening and emotional support / explanantion of path report provided to pt as he voiced frustration re: no dx and multiple tests over months. He feels well, no sx. Path recport released to [place] via [employer] for his review.Pt voiced understanding of above instructions and information and denied further questions, has my direct callback number for interval questions.

## 2023-03-09 DIAGNOSIS — J98.59 MEDIASTINAL MASS: Primary | ICD-10-CM

## 2023-03-09 DIAGNOSIS — C85.92 LYMPHOMA OF INTRATHORACIC LYMPH NODES, UNSPECIFIED LYMPHOMA TYPE (H): ICD-10-CM

## 2023-03-10 ENCOUNTER — PATIENT OUTREACH (OUTPATIENT)
Dept: ONCOLOGY | Facility: CLINIC | Age: 28
End: 2023-03-10

## 2023-03-10 ENCOUNTER — ANCILLARY PROCEDURE (OUTPATIENT)
Dept: PET IMAGING | Facility: CLINIC | Age: 28
End: 2023-03-10
Attending: INTERNAL MEDICINE
Payer: COMMERCIAL

## 2023-03-10 ENCOUNTER — APPOINTMENT (OUTPATIENT)
Dept: LAB | Facility: CLINIC | Age: 28
End: 2023-03-10
Attending: INTERNAL MEDICINE
Payer: COMMERCIAL

## 2023-03-10 ENCOUNTER — ONCOLOGY VISIT (OUTPATIENT)
Dept: ONCOLOGY | Facility: CLINIC | Age: 28
End: 2023-03-10
Attending: STUDENT IN AN ORGANIZED HEALTH CARE EDUCATION/TRAINING PROGRAM
Payer: COMMERCIAL

## 2023-03-10 VITALS
WEIGHT: 223.2 LBS | SYSTOLIC BLOOD PRESSURE: 117 MMHG | BODY MASS INDEX: 33.95 KG/M2 | TEMPERATURE: 98.3 F | DIASTOLIC BLOOD PRESSURE: 73 MMHG | OXYGEN SATURATION: 98 % | RESPIRATION RATE: 16 BRPM | HEART RATE: 64 BPM

## 2023-03-10 DIAGNOSIS — J98.59 MEDIASTINAL MASS: ICD-10-CM

## 2023-03-10 DIAGNOSIS — J98.59 MEDIASTINAL MASS: Primary | ICD-10-CM

## 2023-03-10 DIAGNOSIS — C85.92 LYMPHOMA OF INTRATHORACIC LYMPH NODES, UNSPECIFIED LYMPHOMA TYPE (H): ICD-10-CM

## 2023-03-10 DIAGNOSIS — Z11.59 SCREENING FOR VIRAL DISEASE: ICD-10-CM

## 2023-03-10 LAB
ALBUMIN SERPL BCG-MCNC: 4.8 G/DL (ref 3.5–5.2)
ALP SERPL-CCNC: 67 U/L (ref 40–129)
ALT SERPL W P-5'-P-CCNC: 18 U/L (ref 10–50)
ANION GAP SERPL CALCULATED.3IONS-SCNC: 10 MMOL/L (ref 7–15)
APTT PPP: 32 SECONDS (ref 22–38)
AST SERPL W P-5'-P-CCNC: 13 U/L (ref 10–50)
BASOPHILS # BLD AUTO: 0.1 10E3/UL (ref 0–0.2)
BASOPHILS NFR BLD AUTO: 1 %
BILIRUB SERPL-MCNC: 0.2 MG/DL
BUN SERPL-MCNC: 13.2 MG/DL (ref 6–20)
CALCIUM SERPL-MCNC: 9.7 MG/DL (ref 8.6–10)
CHLORIDE SERPL-SCNC: 103 MMOL/L (ref 98–107)
CREAT SERPL-MCNC: 0.73 MG/DL (ref 0.67–1.17)
DEPRECATED HCO3 PLAS-SCNC: 25 MMOL/L (ref 22–29)
EOSINOPHIL # BLD AUTO: 0.3 10E3/UL (ref 0–0.7)
EOSINOPHIL NFR BLD AUTO: 3 %
ERYTHROCYTE [DISTWIDTH] IN BLOOD BY AUTOMATED COUNT: 13 % (ref 10–15)
FIBRINOGEN PPP-MCNC: 321 MG/DL (ref 170–490)
GFR SERPL CREATININE-BSD FRML MDRD: >90 ML/MIN/1.73M2
GLUCOSE SERPL-MCNC: 104 MG/DL (ref 70–99)
GLUCOSE SERPL-MCNC: 93 MG/DL (ref 70–99)
HBV CORE AB SERPL QL IA: NONREACTIVE
HBV SURFACE AB SERPL IA-ACNC: 1.45 M[IU]/ML
HBV SURFACE AB SERPL IA-ACNC: NONREACTIVE M[IU]/ML
HBV SURFACE AG SERPL QL IA: NONREACTIVE
HCT VFR BLD AUTO: 43.8 % (ref 40–53)
HCV AB SERPL QL IA: NONREACTIVE
HGB BLD-MCNC: 14.6 G/DL (ref 13.3–17.7)
HIV 1+2 AB+HIV1 P24 AG SERPL QL IA: NONREACTIVE
IMM GRANULOCYTES # BLD: 0 10E3/UL
IMM GRANULOCYTES NFR BLD: 0 %
INR PPP: 0.96 (ref 0.85–1.15)
LDH SERPL L TO P-CCNC: 227 U/L (ref 0–250)
LYMPHOCYTES # BLD AUTO: 2.1 10E3/UL (ref 0.8–5.3)
LYMPHOCYTES NFR BLD AUTO: 26 %
MCH RBC QN AUTO: 28.6 PG (ref 26.5–33)
MCHC RBC AUTO-ENTMCNC: 33.3 G/DL (ref 31.5–36.5)
MCV RBC AUTO: 86 FL (ref 78–100)
MONOCYTES # BLD AUTO: 0.7 10E3/UL (ref 0–1.3)
MONOCYTES NFR BLD AUTO: 9 %
NEUTROPHILS # BLD AUTO: 5 10E3/UL (ref 1.6–8.3)
NEUTROPHILS NFR BLD AUTO: 61 %
NRBC # BLD AUTO: 0 10E3/UL
NRBC BLD AUTO-RTO: 0 /100
PHOSPHATE SERPL-MCNC: 2.7 MG/DL (ref 2.5–4.5)
PLATELET # BLD AUTO: 352 10E3/UL (ref 150–450)
POTASSIUM SERPL-SCNC: 4.1 MMOL/L (ref 3.4–5.3)
PROT SERPL-MCNC: 7.3 G/DL (ref 6.4–8.3)
RBC # BLD AUTO: 5.1 10E6/UL (ref 4.4–5.9)
SODIUM SERPL-SCNC: 138 MMOL/L (ref 136–145)
URATE SERPL-MCNC: 6 MG/DL (ref 3.4–7)
WBC # BLD AUTO: 8.1 10E3/UL (ref 4–11)

## 2023-03-10 PROCEDURE — 86706 HEP B SURFACE ANTIBODY: CPT | Performed by: INTERNAL MEDICINE

## 2023-03-10 PROCEDURE — 85014 HEMATOCRIT: CPT | Performed by: INTERNAL MEDICINE

## 2023-03-10 PROCEDURE — 80053 COMPREHEN METABOLIC PANEL: CPT | Performed by: INTERNAL MEDICINE

## 2023-03-10 PROCEDURE — 85730 THROMBOPLASTIN TIME PARTIAL: CPT | Performed by: INTERNAL MEDICINE

## 2023-03-10 PROCEDURE — 86803 HEPATITIS C AB TEST: CPT | Performed by: INTERNAL MEDICINE

## 2023-03-10 PROCEDURE — 88189 FLOWCYTOMETRY/READ 16 & >: CPT | Performed by: STUDENT IN AN ORGANIZED HEALTH CARE EDUCATION/TRAINING PROGRAM

## 2023-03-10 PROCEDURE — 88184 FLOWCYTOMETRY/ TC 1 MARKER: CPT | Performed by: STUDENT IN AN ORGANIZED HEALTH CARE EDUCATION/TRAINING PROGRAM

## 2023-03-10 PROCEDURE — G0463 HOSPITAL OUTPT CLINIC VISIT: HCPCS | Performed by: INTERNAL MEDICINE

## 2023-03-10 PROCEDURE — 88184 FLOWCYTOMETRY/ TC 1 MARKER: CPT | Performed by: INTERNAL MEDICINE

## 2023-03-10 PROCEDURE — 87389 HIV-1 AG W/HIV-1&-2 AB AG IA: CPT | Performed by: INTERNAL MEDICINE

## 2023-03-10 PROCEDURE — 86704 HEP B CORE ANTIBODY TOTAL: CPT | Performed by: INTERNAL MEDICINE

## 2023-03-10 PROCEDURE — 83615 LACTATE (LD) (LDH) ENZYME: CPT | Performed by: INTERNAL MEDICINE

## 2023-03-10 PROCEDURE — 87340 HEPATITIS B SURFACE AG IA: CPT | Performed by: INTERNAL MEDICINE

## 2023-03-10 PROCEDURE — 85610 PROTHROMBIN TIME: CPT | Performed by: INTERNAL MEDICINE

## 2023-03-10 PROCEDURE — 99205 OFFICE O/P NEW HI 60 MIN: CPT | Mod: 24 | Performed by: INTERNAL MEDICINE

## 2023-03-10 PROCEDURE — 85384 FIBRINOGEN ACTIVITY: CPT | Performed by: INTERNAL MEDICINE

## 2023-03-10 PROCEDURE — 36415 COLL VENOUS BLD VENIPUNCTURE: CPT | Performed by: INTERNAL MEDICINE

## 2023-03-10 PROCEDURE — 84100 ASSAY OF PHOSPHORUS: CPT | Performed by: INTERNAL MEDICINE

## 2023-03-10 PROCEDURE — 84550 ASSAY OF BLOOD/URIC ACID: CPT | Performed by: INTERNAL MEDICINE

## 2023-03-10 RX ORDER — IOPAMIDOL 755 MG/ML
50-135 INJECTION, SOLUTION INTRAVASCULAR ONCE
Status: COMPLETED | OUTPATIENT
Start: 2023-03-10 | End: 2023-03-10

## 2023-03-10 RX ADMIN — IOPAMIDOL 119 ML: 755 INJECTION, SOLUTION INTRAVASCULAR at 09:13

## 2023-03-10 ASSESSMENT — PAIN SCALES - GENERAL: PAINLEVEL: NO PAIN (0)

## 2023-03-10 NOTE — LETTER
Date:March 13, 2023      Provider requested that no letter be sent. Do not send.       [facility]

## 2023-03-10 NOTE — LETTER
3/10/2023         RE: Guero Collins  [address]  [facility] [address]        Dear Colleague,    Thank you for referring your patient, Guero Collins, to the [facility]. Please see a copy of my visit note below.                                     [facility]               NEW PATIENT REFERRAL        Mar 10, 2023   Subjective    REFERRAL SOURCE: Alexander Campoverde MD    REASON FOR VISIT: Mediastinal mass    HISTORY OF PRESENT ILLNESS:  Mr. Guero Collins is a 27 year old male who presents for consultation regarding mediastinal mass with concern for lymphoma.     He initially presented to [facility] 1/6/23 with several days of right shoulder and chest pain with activity. Some night sweats but no fevers, unintentional weight loss, lumps/bumps. CTA chest was negative for PE but showed a 9.7 x 6.8 x 6.5 cm anterior mediastinal mass and small right pleural effusion. He was transferred to [facility] and underwent IR-guided core biopsy of the mass on 1/9/23; pathology showed scant fragments of predominantly necrotic tissue with clusters of lymphoid and fibrous tissue.  but AFP and bHCG normal; CT A/P, MRI brain, and testicular U/S also unremarkable. He was subsequently referred to Thoracic Surgery and underwent repeat biopsy via anterior thoracotomy on 2/17/23. Pathology returned showing atypical aggregates of T-lineage cells in a background of extensive fibrosis and necrosis raising concern for possible T-lymphoblastic lymphoma. Flow cytometry just showed maturing precursor T-cells with normal phenotype and TCR rearrangement testing was negative (athough may be limited by poor viability).     He is here today with his partner Madelyn. He feels well overall and has not had any recurrent right shoulder or chest pain since January. He sometimes feels a little short of breath with laying flat. Otherwise no fever/chills, lumps/bumps, N/V, abdominal pain, diarrhea, bleeding, swelling, or  numbness/tingling. Energy and appetite are good, doing all normal daily activities.     PAST MEDICAL HISTORY:  Obesity    FAMILY HISTORY:  No history of malignancies that he is aware of.     SOCIAL HISTORY:  Former smoker, quit in 2020 (smoked 1/2 pack/day x 5 years and vaped for 2-3 years). Used marijuana daily from ages [age] but quit 6 months ago.  Lives in [place], [place] with his significant other and a roommate.  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      No Known Allergies    Current Outpatient Medications:      multivitamin w/minerals (THERA-VIT-M) tablet, Take 1 tablet by mouth daily, Disp: , Rfl:      acetaminophen (TYLENOL) 325 MG tablet, Take 3 tablets (975 mg) by mouth every 8 hours as needed for mild pain (Patient not taking: Reported on 3/7/2023), Disp: 60 tablet, Rfl: 0     methocarbamol (ROBAXIN) 750 MG tablet, Take 1 tablet (750 mg) by mouth every 6 hours as needed for muscle spasms (Patient not taking: Reported on 3/7/2023), Disp: 20 tablet, Rfl: 0     oxyCODONE (ROXICODONE) 5 MG tablet, Take 0.5 tablets (2.5 mg) by mouth every 4 hours as needed for moderate pain (4-6) (Patient not taking: Reported on 3/7/2023), Disp: 10 tablet, Rfl: 0     polyethylene glycol (MIRALAX) 17 GM/Dose powder, Take 17 g by mouth daily (Patient not taking: Reported on 3/7/2023), Disp: 510 g, Rfl: 0     senna-docusate (SENOKOT-S/PERICOLACE) 8.6-50 MG tablet, Take 1 tablet by mouth 2 times daily (Patient not taking: Reported on 3/7/2023), Disp: 60 tablet, Rfl: 0     REVIEW OF SYSTEMS:  12-point ROS reviewed and negative other than that mentioned in HPI.     Objective    VITAL SIGNS:  /73 (BP Location: Right arm)   Pulse 64   Temp 98.3  F (36.8  C) (Oral)   Resp 16   Wt 101.2 kg (223 lb 3.2 oz)   SpO2 98%   BMI 33.95 kg/m      ECOG PS: 0     PHYSICAL EXAM:  General: Awake, alert, in no acute distress. Oriented x 3.  HEENT: Normocephalic, atraumatic. No scleral icterus.  Mucous membranes moist.   Lymph: No cervical, supraclavicular, or axillary lymphadenopathy appreciated.   CV: Regular rate and rhythm. No murmurs, rubs, or gallops appreciated.  Resp: Good inspiratory effort, lungs clear to auscultation bilaterally.  GI: Abdomen soft, nontender, nondistended. Normal bowel sounds. No masses or organomegaly appreciated.   Ext: No peripheral edema bilaterally.  Neuro: CN II-XII grossly intact. Moves all extremities spontaneously.   Skin: No rash, unusual bruising or prominent lesions.  Psych: Pleasant, normal affect.    LABS:  Lab Results   Component Value Date    WBC 8.1 03/10/2023    HGB 14.6 03/10/2023    HCT 43.8 03/10/2023    MCV 86 03/10/2023     03/10/2023    INR 0.96 03/10/2023    PTT 32 03/10/2023     Lab Results   Component Value Date     03/10/2023    POTASSIUM 4.1 03/10/2023    CR 0.73 03/10/2023    BUN 13.2 03/10/2023    CHLORIDE 103 03/10/2023    SHIMA 9.7 03/10/2023     (H) 03/10/2023      Lab Results   Component Value Date    ALT 18 03/10/2023    AST 13 03/10/2023    ALKPHOS 67 03/10/2023    BILITOTAL 0.2 03/10/2023      Lab Results   Component Value Date     03/10/2023    URIC 6.0 03/10/2023     IMAGIN23 CT CAP:  1. Redemonstration of large anterior mediastinal mass measuring up to  7.3 cm with displacement of adjacent structures suspicious for  malignancy. Differential includes lymphoma, thymoma, germ cell tumor.  With a single mildly prominent right hilar lymph node.    2. Small associated pleural effusion.   4. No evidence of distant metastatic disease within the chest abdomen  or pelvis.  5. Slight density within the right lateral aspect of the bladder may  represent hemorrhage or debris, suggest correlation with urinalysis.    3/10/23 PET-CT:  1. Interval reduction in size of mixed cystic and solid anterior  mediastinal mass, measuring up to 6.2 cm, previously 10.5 cm. The mass  demonstrates scattered areas of hypermetabolic FDG  uptake  predominantly centered within the solid components, with intensity  markedly above the liver (SUV max 6.08). No other definite sites of  disease identified.  2. Mild diffusely increased uptake in the axial bone marrow in a  heterogenous pattern without CT correlate is indeterminate, probably  reactive. Attention on follow-up/correlation with bone marrow biopsy.  3. Please refer to same-day high resolution neuroradiology PET/CT for  findings regarding the head and neck.    CT Neck:  1. No evidence of hypermetabolic cervical adenopathy. Relatively  symmetric mild uptake in prominent bilateral cervical level II nodes  is favored reactive.    PATHOLOGY:  1/9/23 Mediastinal biopsy:  MEDIASTINUM, BIOPSY:  - Scant detached fragments of predominantly necrotic tissue with clusters of lymphoid and fibrous tissue  - See comment    Tissue sections show necrotic tissue surrounded by clusters of small lymphocytes and fibrous tissue with non specific chronic inflammation. Immunohistochemical stains performed with adequate controls show that the lymphocytes are positive for CD45, TdT and CD3. Cytokeratin AE1/AE3 and P63 are positive in scattered cells. CD20 and CD5, synaptophysin and SALL4 are negative. Overall these findings are not specific and it could represent  fragments of benign thymic tissue with an abscess formation. Other possibilities that cannot be excluded are a thymoma type AB and a Hodgkin lymphoma lymphocyte rich. If clinically indicated a rebiopsy is recommended.    2/17/23 Mediastinal biopsy:  A. Portion of Anterior Mediastinal Mass:  - Atypical aggregates of T-lineage cells in a background of extensive fibrosis and necrosis  - Negative for fungal and acid fast organisms on special stains  - See comment     B. Additional Portion of Mediastinal Mass:  - Atypical aggregates of T-lineage cells in a background of extensive fibrosis and necrosis  - See comment    This biopsy contains fragments of viable  lymphoid tissue, which is composed of T-lineage cells with a maturing T-cell/thymocyte immunophenotype and shows an overall lobular architecture, including epithelial meshworks. These features, in conjunction with the flow cytometry findings, could be compatible with benign thymic tissue.     However, the presence of extensive necrosis and fibrosis raises concern for a neoplastic process such as T-lymphoblastic lymphoma (or another malignancy). Necrotic tissue cannot be reliably evaluated morphologically or by immunostains or flow cytometry. Thus, although this biopsy is not diagnostic of malignancy, the overall findings are worrisome and additional workup is recommended.    Assessment & Plan   1. Large anterior mediastinal mass    Pleasant 27 year old male initially found to have 9.7 x 6.8 x 6.5 cm anterior mediastinal mass in January. He has had 2 biopsies without definitive diagnosis; however 2nd surgical biopsy did show atypical aggregates of T-lineage cells in a background of extensive fibrosis and necrosis. Flow cytometry and T-cell rearrangement testing was negative; however there was concern for T-lymphoblastic lymphoma due to the extensive necrosis. LDH was 667 in January but now normalized.     We obtained PET-CT earlier today to look for additional lymph nodes that could potentially be biopsied; however there were none and the mediastinal mass itself is smaller following the surgical biopsy with SUVmax of 6 in the solid components and internal necrosis. The low SUV and lack of growth over 2 months does not seem quite consistent with an aggressive T-cell lymphoma/leukemia; however will complete the work-up with peripheral flow cytometry and bone marrow biopsy (axial skeleton was mildly FDG-avid). If negative, may need to discuss full resection with Dr. Campoverde. Differential also includes atypical thymic tissue or thymoma/thymic carcinoma although it is not clear why the mass would be so necrotic with  these etiologies. Very perplexing case overall. Fortunately the patient remains quite asymptomatic.     PLAN:  - Bone marrow biopsy next week  - Plan to discuss case with Thoracic colleagues if BmBx returns negative    Total of 60 minutes on patient visit, reviewing records, interpreting test results, placing orders, and documentation on the day of service.    Elle Bernal MD  Attending Physician, [facility]      Again, thank you for allowing me to participate in the care of your patient.        Sincerely,        Elle Bernal MD

## 2023-03-10 NOTE — PROGRESS NOTES
[facility]: Cancer Care Initial Note                                    Discussion with Patient:                                                      I met with the patient and his partner during his consultation with Dr. Bernal. Please refer to Dr. Bernal's note for full clinical history and plan of care.    Assessment:                                                      Initial  Current living arrangement:: I live in a private home with family  Informal Support system:: Partner  Bed or wheelchair confined:: No  Mobility Status: Independent  Transportation means:: Regular car  Medication adherence problem (GOAL):: No  Knowledgeable about how to use meds:: Yes  Medication side effects suspected:: No  Referrals Placed: None    No assessment indicated    Intervention/Education provided during outreach:                                                       PLAN    Obtain bone marrow biopsy (scheduled for 3/15/2023)    Follow up pending results - Dr. Bernal will discuss results once the have come back    Patient to follow up as scheduled at next appt  Patient to call/[employer] message with updates  Route to provider to further advise    Signature:  Sathya Harry DNP, RN, OCN  RN Care Coordinator   Dr. Elle Bernal and Dr. Carlie Reilly  [facility]

## 2023-03-10 NOTE — PROGRESS NOTES
[facility]               NEW PATIENT REFERRAL        Mar 10, 2023   Subjective   REFERRAL SOURCE: Alexander Campoverde MD    REASON FOR VISIT: Mediastinal mass    HISTORY OF PRESENT ILLNESS:  Mr. Guero Collins is a 27 year old male who presents for consultation regarding mediastinal mass with concern for lymphoma.     He initially presented to [facility] 1/6/23 with several days of right shoulder and chest pain with activity. Some night sweats but no fevers, unintentional weight loss, lumps/bumps. CTA chest was negative for PE but showed a 9.7 x 6.8 x 6.5 cm anterior mediastinal mass and small right pleural effusion. He was transferred to [facility] and underwent IR-guided core biopsy of the mass on 1/9/23; pathology showed scant fragments of predominantly necrotic tissue with clusters of lymphoid and fibrous tissue.  but AFP and bHCG normal; CT A/P, MRI brain, and testicular U/S also unremarkable. He was subsequently referred to Thoracic Surgery and underwent repeat biopsy via anterior thoracotomy on 2/17/23. Pathology returned showing atypical aggregates of T-lineage cells in a background of extensive fibrosis and necrosis raising concern for possible T-lymphoblastic lymphoma. Flow cytometry just showed maturing precursor T-cells with normal phenotype and TCR rearrangement testing was negative (athough may be limited by poor viability).     He is here today with his partner Madelyn. He feels well overall and has not had any recurrent right shoulder or chest pain since January. He sometimes feels a little short of breath with laying flat. Otherwise no fever/chills, lumps/bumps, N/V, abdominal pain, diarrhea, bleeding, swelling, or numbness/tingling. Energy and appetite are good, doing all normal daily activities.     PAST MEDICAL HISTORY:  Obesity    FAMILY HISTORY:  No history of malignancies that he is aware of.     SOCIAL HISTORY:  Former smoker, quit in 2020 (smoked 1/2  pack/day x 5 years and vaped for 2-3 years). Used marijuana daily from ages [age] but quit 6 months ago.  Lives in [place], [place] with his significant other and a roommate.  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      No Known Allergies    Current Outpatient Medications:      multivitamin w/minerals (THERA-VIT-M) tablet, Take 1 tablet by mouth daily, Disp: , Rfl:      acetaminophen (TYLENOL) 325 MG tablet, Take 3 tablets (975 mg) by mouth every 8 hours as needed for mild pain (Patient not taking: Reported on 3/7/2023), Disp: 60 tablet, Rfl: 0     methocarbamol (ROBAXIN) 750 MG tablet, Take 1 tablet (750 mg) by mouth every 6 hours as needed for muscle spasms (Patient not taking: Reported on 3/7/2023), Disp: 20 tablet, Rfl: 0     oxyCODONE (ROXICODONE) 5 MG tablet, Take 0.5 tablets (2.5 mg) by mouth every 4 hours as needed for moderate pain (4-6) (Patient not taking: Reported on 3/7/2023), Disp: 10 tablet, Rfl: 0     polyethylene glycol (MIRALAX) 17 GM/Dose powder, Take 17 g by mouth daily (Patient not taking: Reported on 3/7/2023), Disp: 510 g, Rfl: 0     senna-docusate (SENOKOT-S/PERICOLACE) 8.6-50 MG tablet, Take 1 tablet by mouth 2 times daily (Patient not taking: Reported on 3/7/2023), Disp: 60 tablet, Rfl: 0     REVIEW OF SYSTEMS:  12-point ROS reviewed and negative other than that mentioned in HPI.     Objective   VITAL SIGNS:  /73 (BP Location: Right arm)   Pulse 64   Temp 98.3  F (36.8  C) (Oral)   Resp 16   Wt 101.2 kg (223 lb 3.2 oz)   SpO2 98%   BMI 33.95 kg/m      ECOG PS: 0     PHYSICAL EXAM:  General: Awake, alert, in no acute distress. Oriented x 3.  HEENT: Normocephalic, atraumatic. No scleral icterus. Mucous membranes moist.   Lymph: No cervical, supraclavicular, or axillary lymphadenopathy appreciated.   CV: Regular rate and rhythm. No murmurs, rubs, or gallops appreciated.  Resp: Good inspiratory effort, lungs clear to auscultation  bilaterally.  GI: Abdomen soft, nontender, nondistended. Normal bowel sounds. No masses or organomegaly appreciated.   Ext: No peripheral edema bilaterally.  Neuro: CN II-XII grossly intact. Moves all extremities spontaneously.   Skin: No rash, unusual bruising or prominent lesions.  Psych: Pleasant, normal affect.    LABS:  Lab Results   Component Value Date    WBC 8.1 03/10/2023    HGB 14.6 03/10/2023    HCT 43.8 03/10/2023    MCV 86 03/10/2023     03/10/2023    INR 0.96 03/10/2023    PTT 32 03/10/2023     Lab Results   Component Value Date     03/10/2023    POTASSIUM 4.1 03/10/2023    CR 0.73 03/10/2023    BUN 13.2 03/10/2023    CHLORIDE 103 03/10/2023    SHIMA 9.7 03/10/2023     (H) 03/10/2023      Lab Results   Component Value Date    ALT 18 03/10/2023    AST 13 03/10/2023    ALKPHOS 67 03/10/2023    BILITOTAL 0.2 03/10/2023      Lab Results   Component Value Date     03/10/2023    URIC 6.0 03/10/2023     IMAGIN23 CT CAP:  1. Redemonstration of large anterior mediastinal mass measuring up to  7.3 cm with displacement of adjacent structures suspicious for  malignancy. Differential includes lymphoma, thymoma, germ cell tumor.  With a single mildly prominent right hilar lymph node.    2. Small associated pleural effusion.   4. No evidence of distant metastatic disease within the chest abdomen  or pelvis.  5. Slight density within the right lateral aspect of the bladder may  represent hemorrhage or debris, suggest correlation with urinalysis.    3/10/23 PET-CT:  1. Interval reduction in size of mixed cystic and solid anterior  mediastinal mass, measuring up to 6.2 cm, previously 10.5 cm. The mass  demonstrates scattered areas of hypermetabolic FDG uptake  predominantly centered within the solid components, with intensity  markedly above the liver (SUV max 6.08). No other definite sites of  disease identified.  2. Mild diffusely increased uptake in the axial bone marrow in  a  heterogenous pattern without CT correlate is indeterminate, probably  reactive. Attention on follow-up/correlation with bone marrow biopsy.  3. Please refer to same-day high resolution neuroradiology PET/CT for  findings regarding the head and neck.    CT Neck:  1. No evidence of hypermetabolic cervical adenopathy. Relatively  symmetric mild uptake in prominent bilateral cervical level II nodes  is favored reactive.    PATHOLOGY:  1/9/23 Mediastinal biopsy:  MEDIASTINUM, BIOPSY:  - Scant detached fragments of predominantly necrotic tissue with clusters of lymphoid and fibrous tissue  - See comment    Tissue sections show necrotic tissue surrounded by clusters of small lymphocytes and fibrous tissue with non specific chronic inflammation. Immunohistochemical stains performed with adequate controls show that the lymphocytes are positive for CD45, TdT and CD3. Cytokeratin AE1/AE3 and P63 are positive in scattered cells. CD20 and CD5, synaptophysin and SALL4 are negative. Overall these findings are not specific and it could represent  fragments of benign thymic tissue with an abscess formation. Other possibilities that cannot be excluded are a thymoma type AB and a Hodgkin lymphoma lymphocyte rich. If clinically indicated a rebiopsy is recommended.    2/17/23 Mediastinal biopsy:  A. Portion of Anterior Mediastinal Mass:  - Atypical aggregates of T-lineage cells in a background of extensive fibrosis and necrosis  - Negative for fungal and acid fast organisms on special stains  - See comment     B. Additional Portion of Mediastinal Mass:  - Atypical aggregates of T-lineage cells in a background of extensive fibrosis and necrosis  - See comment    This biopsy contains fragments of viable lymphoid tissue, which is composed of T-lineage cells with a maturing T-cell/thymocyte immunophenotype and shows an overall lobular architecture, including epithelial meshworks. These features, in conjunction with the flow cytometry  findings, could be compatible with benign thymic tissue.     However, the presence of extensive necrosis and fibrosis raises concern for a neoplastic process such as T-lymphoblastic lymphoma (or another malignancy). Necrotic tissue cannot be reliably evaluated morphologically or by immunostains or flow cytometry. Thus, although this biopsy is not diagnostic of malignancy, the overall findings are worrisome and additional workup is recommended.    Assessment & Plan   1. Large anterior mediastinal mass    Pleasant 27 year old male initially found to have 9.7 x 6.8 x 6.5 cm anterior mediastinal mass in January. He has had 2 biopsies without definitive diagnosis; however 2nd surgical biopsy did show atypical aggregates of T-lineage cells in a background of extensive fibrosis and necrosis. Flow cytometry and T-cell rearrangement testing was negative; however there was concern for T-lymphoblastic lymphoma due to the extensive necrosis. LDH was 667 in January but now normalized.     We obtained PET-CT earlier today to look for additional lymph nodes that could potentially be biopsied; however there were none and the mediastinal mass itself is smaller following the surgical biopsy with SUVmax of 6 in the solid components and internal necrosis. The low SUV and lack of growth over 2 months does not seem quite consistent with an aggressive T-cell lymphoma/leukemia; however will complete the work-up with peripheral flow cytometry and bone marrow biopsy (axial skeleton was mildly FDG-avid). If negative, may need to discuss full resection with Dr. Campoverde. Differential also includes atypical thymic tissue or thymoma/thymic carcinoma although it is not clear why the mass would be so necrotic with these etiologies. Very perplexing case overall. Fortunately the patient remains quite asymptomatic.     PLAN:  - Bone marrow biopsy next week  - Plan to discuss case with Thoracic colleagues if BmBx returns negative    Total of 60  minutes on patient visit, reviewing records, interpreting test results, placing orders, and documentation on the day of service.    Elle Bernal MD  Attending Physician, [facility]

## 2023-03-10 NOTE — NURSING NOTE
"Oncology Rooming Note    March 10, 2023 11:07 AM   Guero Collins is a 27 year old male who presents for:    Chief Complaint   Patient presents with     Blood Draw     Labs drawn via piv by RN. Vitals taken.     RECHECK     Mediastinal mass here for provider visit     Initial Vitals: /73 (BP Location: Right arm)   Pulse 64   Temp 98.3  F (36.8  C) (Oral)   Resp 16   Wt 101.2 kg (223 lb 3.2 oz)   SpO2 98%   BMI 33.95 kg/m   Estimated body mass index is 33.95 kg/m  as calculated from the following:    Height as of 3/7/23: 1.727 m (5' 7.99\").    Weight as of this encounter: 101.2 kg (223 lb 3.2 oz). Body surface area is 2.2 meters squared.  No Pain (0) Comment: Data Unavailable   No LMP for male patient.  Allergies reviewed: Yes  Medications reviewed: Yes    Medications: Medication refills not needed today.  Pharmacy name entered into [employer]:    [id number] - [place], [place] - [address]  [facility] [address] [place] [address] [address] [facility]    Clinical concerns:None    José Keita RN              "

## 2023-03-10 NOTE — DISCHARGE INSTRUCTIONS

## 2023-03-10 NOTE — NURSING NOTE
Chief Complaint   Patient presents with     Blood Draw     Labs drawn via piv by RN. Vitals taken.     Labs drawn from PIV by RN. Line flushed with saline. Vitals taken. Pt checked in for appointment(s).    Mallika Stack RN

## 2023-03-13 LAB
PATH REPORT.ADDENDUM SPEC: ABNORMAL
PATH REPORT.COMMENTS IMP SPEC: ABNORMAL
PATH REPORT.COMMENTS IMP SPEC: YES
PATH REPORT.FINAL DX SPEC: ABNORMAL
PATH REPORT.GROSS SPEC: ABNORMAL
PATH REPORT.INTRAOP OBS SPEC DOC: ABNORMAL
PATH REPORT.MICROSCOPIC SPEC OTHER STN: ABNORMAL
PATH REPORT.RELEVANT HX SPEC: ABNORMAL
PHOTO IMAGE: ABNORMAL

## 2023-03-15 ENCOUNTER — APPOINTMENT (OUTPATIENT)
Dept: LAB | Facility: CLINIC | Age: 28
End: 2023-03-15
Attending: INTERNAL MEDICINE
Payer: COMMERCIAL

## 2023-03-15 ENCOUNTER — OFFICE VISIT (OUTPATIENT)
Dept: ONCOLOGY | Facility: CLINIC | Age: 28
End: 2023-03-15
Attending: INTERNAL MEDICINE
Payer: COMMERCIAL

## 2023-03-15 VITALS
SYSTOLIC BLOOD PRESSURE: 106 MMHG | RESPIRATION RATE: 16 BRPM | HEART RATE: 73 BPM | OXYGEN SATURATION: 98 % | BODY MASS INDEX: 34.22 KG/M2 | DIASTOLIC BLOOD PRESSURE: 58 MMHG | WEIGHT: 225 LBS | TEMPERATURE: 98.5 F

## 2023-03-15 DIAGNOSIS — J98.59 MEDIASTINAL MASS: Primary | ICD-10-CM

## 2023-03-15 DIAGNOSIS — C85.92 LYMPHOMA OF INTRATHORACIC LYMPH NODES, UNSPECIFIED LYMPHOMA TYPE (H): ICD-10-CM

## 2023-03-15 LAB
BASOPHILS # BLD AUTO: 0.1 10E3/UL (ref 0–0.2)
BASOPHILS NFR BLD AUTO: 1 %
EOSINOPHIL # BLD AUTO: 0.2 10E3/UL (ref 0–0.7)
EOSINOPHIL NFR BLD AUTO: 3 %
ERYTHROCYTE [DISTWIDTH] IN BLOOD BY AUTOMATED COUNT: 13.2 % (ref 10–15)
HCT VFR BLD AUTO: 44.5 % (ref 40–53)
HGB BLD-MCNC: 14.6 G/DL (ref 13.3–17.7)
IMM GRANULOCYTES # BLD: 0 10E3/UL
IMM GRANULOCYTES NFR BLD: 1 %
LYMPHOCYTES # BLD AUTO: 2.2 10E3/UL (ref 0.8–5.3)
LYMPHOCYTES NFR BLD AUTO: 34 %
MCH RBC QN AUTO: 29 PG (ref 26.5–33)
MCHC RBC AUTO-ENTMCNC: 32.8 G/DL (ref 31.5–36.5)
MCV RBC AUTO: 88 FL (ref 78–100)
MONOCYTES # BLD AUTO: 0.5 10E3/UL (ref 0–1.3)
MONOCYTES NFR BLD AUTO: 8 %
NEUTROPHILS # BLD AUTO: 3.4 10E3/UL (ref 1.6–8.3)
NEUTROPHILS NFR BLD AUTO: 53 %
NRBC # BLD AUTO: 0 10E3/UL
NRBC BLD AUTO-RTO: 0 /100
PLATELET # BLD AUTO: 317 10E3/UL (ref 150–450)
RBC # BLD AUTO: 5.04 10E6/UL (ref 4.4–5.9)
WBC # BLD AUTO: 6.3 10E3/UL (ref 4–11)

## 2023-03-15 PROCEDURE — 36415 COLL VENOUS BLD VENIPUNCTURE: CPT | Performed by: STUDENT IN AN ORGANIZED HEALTH CARE EDUCATION/TRAINING PROGRAM

## 2023-03-15 PROCEDURE — 96374 THER/PROPH/DIAG INJ IV PUSH: CPT | Mod: 59 | Performed by: STUDENT IN AN ORGANIZED HEALTH CARE EDUCATION/TRAINING PROGRAM

## 2023-03-15 PROCEDURE — 85025 COMPLETE CBC W/AUTO DIFF WBC: CPT | Performed by: STUDENT IN AN ORGANIZED HEALTH CARE EDUCATION/TRAINING PROGRAM

## 2023-03-15 PROCEDURE — 88264 CHROMOSOME ANALYSIS 20-25: CPT | Performed by: STUDENT IN AN ORGANIZED HEALTH CARE EDUCATION/TRAINING PROGRAM

## 2023-03-15 PROCEDURE — 38222 DX BONE MARROW BX & ASPIR: CPT | Performed by: STUDENT IN AN ORGANIZED HEALTH CARE EDUCATION/TRAINING PROGRAM

## 2023-03-15 PROCEDURE — 88313 SPECIAL STAINS GROUP 2: CPT | Mod: 26 | Performed by: STUDENT IN AN ORGANIZED HEALTH CARE EDUCATION/TRAINING PROGRAM

## 2023-03-15 PROCEDURE — 88342 IMHCHEM/IMCYTCHM 1ST ANTB: CPT | Mod: 26 | Performed by: STUDENT IN AN ORGANIZED HEALTH CARE EDUCATION/TRAINING PROGRAM

## 2023-03-15 PROCEDURE — 88184 FLOWCYTOMETRY/ TC 1 MARKER: CPT | Performed by: STUDENT IN AN ORGANIZED HEALTH CARE EDUCATION/TRAINING PROGRAM

## 2023-03-15 PROCEDURE — 88189 FLOWCYTOMETRY/READ 16 & >: CPT | Mod: GC | Performed by: STUDENT IN AN ORGANIZED HEALTH CARE EDUCATION/TRAINING PROGRAM

## 2023-03-15 PROCEDURE — 85097 BONE MARROW INTERPRETATION: CPT | Performed by: STUDENT IN AN ORGANIZED HEALTH CARE EDUCATION/TRAINING PROGRAM

## 2023-03-15 PROCEDURE — 85060 BLOOD SMEAR INTERPRETATION: CPT | Performed by: STUDENT IN AN ORGANIZED HEALTH CARE EDUCATION/TRAINING PROGRAM

## 2023-03-15 PROCEDURE — 88312 SPECIAL STAINS GROUP 1: CPT | Mod: 26 | Performed by: STUDENT IN AN ORGANIZED HEALTH CARE EDUCATION/TRAINING PROGRAM

## 2023-03-15 PROCEDURE — 250N000011 HC RX IP 250 OP 636: Performed by: STUDENT IN AN ORGANIZED HEALTH CARE EDUCATION/TRAINING PROGRAM

## 2023-03-15 PROCEDURE — 88365 INSITU HYBRIDIZATION (FISH): CPT | Mod: 26 | Performed by: STUDENT IN AN ORGANIZED HEALTH CARE EDUCATION/TRAINING PROGRAM

## 2023-03-15 PROCEDURE — 88341 IMHCHEM/IMCYTCHM EA ADD ANTB: CPT | Mod: 26 | Performed by: STUDENT IN AN ORGANIZED HEALTH CARE EDUCATION/TRAINING PROGRAM

## 2023-03-15 PROCEDURE — 88305 TISSUE EXAM BY PATHOLOGIST: CPT | Mod: 26 | Performed by: STUDENT IN AN ORGANIZED HEALTH CARE EDUCATION/TRAINING PROGRAM

## 2023-03-15 PROCEDURE — 88311 DECALCIFY TISSUE: CPT | Mod: 26 | Performed by: STUDENT IN AN ORGANIZED HEALTH CARE EDUCATION/TRAINING PROGRAM

## 2023-03-15 PROCEDURE — 88305 TISSUE EXAM BY PATHOLOGIST: CPT | Mod: TC | Performed by: STUDENT IN AN ORGANIZED HEALTH CARE EDUCATION/TRAINING PROGRAM

## 2023-03-15 RX ADMIN — MIDAZOLAM HYDROCHLORIDE 1 MG: 1 INJECTION, SOLUTION INTRAMUSCULAR; INTRAVENOUS at 10:25

## 2023-03-15 ASSESSMENT — PAIN SCALES - GENERAL: PAINLEVEL: NO PAIN (0)

## 2023-03-15 NOTE — NURSING NOTE
BMBX Teaching and Assessment       Teaching concerns addressed: Bone marrow biopsy and infection prevention.     Person(s) involved in teaching: Patient  Motivation Level  Asks Questions: Yes  Eager to Learn: Yes  Cooperative: Yes  Receptive (willing/able to accept information): Yes    Patient demonstrates understanding of the following:     Reason for the appointment, diagnosis and treatment plan: Yes  Knowledge of proper use of medications and conditions for which they are ordered (with special attention to potential side effects or drug interactions): Yes  Which situations necessitate calling provider and whom to contact: Yes    Teaching concerns addressed:   Reviewed activity restrictions if received premeds, potential for bleeding and actions to take if develops any of the issues below    Pain management techniques: Yes  Patient instructed on hand hygiene: Yes  How and/when to access community resources: Yes    Infection Control:  Patient demonstrates understanding of the following:   Bone marrow procedure site care taught: Yes  Signs and symptoms of infection taught: Yes       Instructional Materials Used/Given: Pt instructed to keep bmbx site clean and dry for 24hrs. Pt educated to monitor site for signs of infection such as redness, rash, oozing, puss, bleeding, pain, and elevated temp. Pt instructed to go to call the [facility] triage line or go to the ER if any signs of infection should occur. Pt educated to not operate machinery if receiving versed. Pt and wife verbalize understanding.     Pre-procedure labs drawn via PIV placed by RN. Post procedure: Patient vital signs stable, ambulating, site is clean, dry and intact prior to discharge. PIV removed vs port heparin locked and de-accessed vs CVC heparin locked. Pt discharged with wife as .      Time spent with patient: 10 minutes.  Pt requests IV versed pre med, small dose      Post procedure VSS drsg dry and intact  Pt states next time he will ask for 2  mg versed

## 2023-03-15 NOTE — PROGRESS NOTES
BMT ONC Adult Bone Marrow Biopsy Procedure Note  March 15, 2023  /65   Pulse 73   Temp 98.5  F (36.9  C)   Resp 16   Wt 102.1 kg (225 lb)   SpO2 98%   BMI 34.22 kg/m       Learning needs assessment complete within 12 months? YES    DIAGNOSIS: Mediastinal mass, assessing for lymphoma    PROCEDURE: Unilateral Bone Marrow Biopsy    LOCATION: [facility] 2nd [facility]    Patient s identification was positively verified by verbal identification and invasive procedure safety checklist was completed. Informed consent was obtained. Following the administration of 1 mg  Midazolam as pre-medication, patient was placed in the prone position and prepped and draped in a sterile manner. Approximately 10 cc of 1% Lidocaine was initially used over the left posterior iliac spine, followed by an additional 5cc of 1% lidocaine and subsequent additional 5cc 1% lidocaine (total of 20cc) based on patient tolerance throughout procedure. Following initial 10cc of lidocaine a 3 mm incision was made. Trephine bone marrow core(s) was (were) obtained from the [facility]. Bone marrow aspirates were obtained from the [facility]. Aspirates were sent for morphology, immunophenotyping, cytogenetics and molecular diagnostics -process and hold. A total of approximately 17 ml of marrow was aspirated. Following this procedure a sterile dressing was applied to the bone marrow biopsy site(s). The patient was placed in the supine position to maintain pressure on the biopsy site. Post-procedure wound care instructions were given.     Complications: Some pain in left buttock/thigh with initial attempt at core retrieval. Additional lidocaine administered as noted above in new site and subsequently tolerated well.     Post-procedural pain assessment: No pain while resting post-procedure.     Interventions: NO    Length of procedure:21 minutes to 45 minutes    Procedure performed by: Amber Scheierl, CNP assisted by Leola Leal CNP

## 2023-03-15 NOTE — LETTER
3/15/2023         RE: Guero Collins  [address] [facility] [address]        Dear Colleague,    Thank you for referring your patient, Guero Collins, to the [facility]. Please see a copy of my visit note below.      BMT ONC Adult Bone Marrow Biopsy Procedure Note  March 15, 2023  /65   Pulse 73   Temp 98.5  F (36.9  C)   Resp 16   Wt 102.1 kg (225 lb)   SpO2 98%   BMI 34.22 kg/m       Learning needs assessment complete within 12 months? YES    DIAGNOSIS: Mediastinal mass, assessing for lymphoma    PROCEDURE: Unilateral Bone Marrow Biopsy    [place] [facility]    Patient s identification was positively verified by verbal identification and invasive procedure safety checklist was completed. Informed consent was obtained. Following the administration of 1 mg  Midazolam as pre-medication, patient was placed in the prone position and prepped and draped in a sterile manner. Approximately 10 cc of 1% Lidocaine was initially used over the left posterior iliac spine, followed by an additional 5cc of 1% lidocaine and subsequent additional 5cc 1% lidocaine (total of 20cc) based on patient tolerance throughout procedure. Following initial 10cc of lidocaine a 3 mm incision was made. Trephine bone marrow core(s) was (were) obtained from the IC. Bone marrow aspirates were obtained from the IC. Aspirates were sent for morphology, immunophenotyping, cytogenetics and molecular diagnostics -process and hold. A total of approximately 17 ml of marrow was aspirated. Following this procedure a sterile dressing was applied to the bone marrow biopsy site(s). The patient was placed in the supine position to maintain pressure on the biopsy site. Post-procedure wound care instructions were given.     Complications: Some pain in left buttock/thigh with initial attempt at core retrieval. Additional lidocaine administered as noted above in new site and subsequently tolerated well.      Post-procedural pain assessment: No pain while resting post-procedure.     Interventions: NO    Length of procedure:21 minutes to 45 minutes    Procedure performed by: Amber Scheierl, CNP assisted by Leola Leal CNP        Again, thank you for allowing me to participate in the care of your patient.        Sincerely,        Amber J. Scheierl, APRN CNP

## 2023-03-15 NOTE — LETTER
Date:March 16, 2023      Provider requested that no letter be sent. Do not send.       [facility]

## 2023-03-17 ENCOUNTER — PATIENT OUTREACH (OUTPATIENT)
Dept: ONCOLOGY | Facility: CLINIC | Age: 28
End: 2023-03-17

## 2023-03-17 NOTE — PROGRESS NOTES
"[facility]: Cancer Care                                                                                            RNCC called patient per the request of Dr. Bernal to discuss plan for follow up after his bmbx. Per Dr. Bernal \"If negative, I may just give him a phone call. If positive, I can add on him for a video visit sometime next week\". Patient was updated and stated an understanding. Patient will await a decision after his bmbx results.     Karen Sumner, RN, BSN  RN Care Coordinator   [phone]    "

## 2023-03-20 DIAGNOSIS — D76.1: Primary | ICD-10-CM

## 2023-03-20 DIAGNOSIS — J98.59 MEDIASTINAL MASS: ICD-10-CM

## 2023-03-21 ENCOUNTER — TELEPHONE (OUTPATIENT)
Dept: ONCOLOGY | Facility: CLINIC | Age: 28
End: 2023-03-21
Payer: COMMERCIAL

## 2023-03-21 LAB — INTERPRETATION: NORMAL

## 2023-03-21 NOTE — TELEPHONE ENCOUNTER
Called Guero to review results of bone marrow biopsy from last week. Overall there is no evidence of leukemia/lymphoma or other malignancy by morphology or flow. There were incidentally some rare histiocytes containing nucleated cells (hemophagocytosis), although clinical picture is not consistent with HLH given normal CBC, LFTs, fibrinogen, and lack of fever/splenomegaly. However will obtain some additional labs including ferritin, triglycerides, and EBV level.     Given 2 nondiagnostic biopsies of the mediastinal mass and lack of other sites to biopsy, discussed case with Thoracic colleagues who recommended full resection of the mass. Will refer patient back to Dr. Campoverde for discussion of this.

## 2023-03-22 LAB
PATH REPORT.ADDENDUM SPEC: NORMAL
PATH REPORT.COMMENTS IMP SPEC: NORMAL
PATH REPORT.COMMENTS IMP SPEC: NORMAL
PATH REPORT.FINAL DX SPEC: NORMAL
PATH REPORT.GROSS SPEC: NORMAL
PATH REPORT.MICROSCOPIC SPEC OTHER STN: NORMAL
PATH REPORT.MICROSCOPIC SPEC OTHER STN: NORMAL
PATH REPORT.RELEVANT HX SPEC: NORMAL

## 2023-04-04 ENCOUNTER — ONCOLOGY VISIT (OUTPATIENT)
Dept: SURGERY | Facility: CLINIC | Age: 28
End: 2023-04-04
Attending: THORACIC SURGERY (CARDIOTHORACIC VASCULAR SURGERY)
Payer: COMMERCIAL

## 2023-04-04 ENCOUNTER — APPOINTMENT (OUTPATIENT)
Dept: LAB | Facility: CLINIC | Age: 28
End: 2023-04-04
Attending: THORACIC SURGERY (CARDIOTHORACIC VASCULAR SURGERY)
Payer: COMMERCIAL

## 2023-04-04 ENCOUNTER — PREP FOR PROCEDURE (OUTPATIENT)
Dept: SURGERY | Facility: CLINIC | Age: 28
End: 2023-04-04

## 2023-04-04 VITALS
TEMPERATURE: 98.6 F | RESPIRATION RATE: 16 BRPM | OXYGEN SATURATION: 100 % | SYSTOLIC BLOOD PRESSURE: 140 MMHG | BODY MASS INDEX: 34.52 KG/M2 | DIASTOLIC BLOOD PRESSURE: 67 MMHG | HEART RATE: 65 BPM | WEIGHT: 227 LBS

## 2023-04-04 DIAGNOSIS — J98.59 MEDIASTINAL MASS: Primary | ICD-10-CM

## 2023-04-04 LAB
APTT PPP: 29 SECONDS (ref 22–38)
HOLD SPECIMEN: NORMAL

## 2023-04-04 PROCEDURE — 85730 THROMBOPLASTIN TIME PARTIAL: CPT | Performed by: THORACIC SURGERY (CARDIOTHORACIC VASCULAR SURGERY)

## 2023-04-04 PROCEDURE — G0463 HOSPITAL OUTPT CLINIC VISIT: HCPCS | Performed by: THORACIC SURGERY (CARDIOTHORACIC VASCULAR SURGERY)

## 2023-04-04 PROCEDURE — 99024 POSTOP FOLLOW-UP VISIT: CPT | Performed by: THORACIC SURGERY (CARDIOTHORACIC VASCULAR SURGERY)

## 2023-04-04 PROCEDURE — 36415 COLL VENOUS BLD VENIPUNCTURE: CPT | Performed by: THORACIC SURGERY (CARDIOTHORACIC VASCULAR SURGERY)

## 2023-04-04 RX ORDER — CEFAZOLIN SODIUM 2 G/50ML
2 SOLUTION INTRAVENOUS SEE ADMIN INSTRUCTIONS
Status: CANCELLED | OUTPATIENT
Start: 2023-04-04

## 2023-04-04 RX ORDER — CEFAZOLIN SODIUM 2 G/50ML
2 SOLUTION INTRAVENOUS
Status: CANCELLED | OUTPATIENT
Start: 2023-04-04

## 2023-04-04 RX ORDER — ENOXAPARIN SODIUM 100 MG/ML
40 INJECTION SUBCUTANEOUS
Status: CANCELLED | OUTPATIENT
Start: 2023-04-04

## 2023-04-04 ASSESSMENT — PAIN SCALES - GENERAL: PAINLEVEL: NO PAIN (0)

## 2023-04-04 NOTE — LETTER
4/4/2023         RE: Guero Collins  [address] [facility] [address]        Dear Colleague,    Thank you for referring your patient, Guero Collins, to the [facility]. Please see a copy of my visit note below.    THORACIC SURGERY FOLLOW UP VISIT     I saw Guero Collins in follow-up today. The clinical summary follows:      PREOP DIAGNOSIS   Thymic mass     PROCEDURE   Right anterior thoracotomy, biopsy mediastinal mass     DATE OF PROCEDURE  2/17/2023     HISTOPATHOLOGY   Atypical aggregates of T-lineage cells in a background of extensive fibrosis and necrosis     This biopsy contains fragments of viable lymphoid tissue, which is composed of T-lineage cells with a maturing T-cell/thymocyte immunophenotype and shows an overall lobular architecture, including epithelial meshworks. These features, in conjunction with the flow cytometry findings, could be compatible with benign thymic tissue.     However, the presence of extensive necrosis and fibrosis raises concern for a neoplastic process such as T-lymphoblastic lymphoma (or another malignancy). Necrotic tissue cannot be reliably evaluated morphologically or by immunostains or flow cytometry. Thus, although this biopsy is not diagnostic of malignancy, the overall findings are worrisome and additional workup is recommended.    Bone marrow biopsy 3/15/2023:  No evidence of cancer. Flow cytometry no evidence of non-Hodgkin lymphoma or leukemia     PET scan 3/10/2023:  Anterior mediastinal mass 3.7 x 6.2 x 6 cm, SUVmax 6.08        CT-guided biopsy 1/9/2023:  Scant necrotic tissue with clusters of lymphoid and fibrous tissue - nondiagnostic        MR Brain 1/7/2023:   Normal     Testicular ultrasound 1/7/2023:  Normal     CT chest 1/6/2023:    9.7 x 6.8 x 6.5 cm     Labs 1/6/2023:  Beta-HCG:  Normal  AFP:  Normal  LDH: 663    ETOH:  Occasional  TOB:  Ages [age] 1/2 pack per day, then vaping for 2-3 years. Quit about 2 years ago.     Daily marijuana use ages 17 - 27. Quit 6 months     BP (!) 140/67   Pulse 65   Temp 98.6  F (37  C)   Resp 16   Wt 103 kg (227 lb)   SpO2 100%   BMI 34.52 kg/m           SUBJECTIVE   Mr. Collins is doing well. The tumor board recommends to proceed with resection.     From a personal perspective, he is here with his wife. 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      IMPRESSION   (J98.59) Mediastinal mass  (primary encounter diagnosis)     Guero Collins is a 27 year old man who has recovered well after undergoing a  right anterior thoracotomy, biopsy mediastinal mass on 2/17/2023. The differential includes lymphoma or thymoma.     PLAN  I spent22 min on the date of the encounter in chart review, patient visit, review of tests, documentation and/or discussion with other providers about the issues documented above. I reviewed the plan as follows:    Procedure planned: Robot-assisted thoracoscopic mediastinal mass resection    I discussed the risks and benefits of the procedure.  The risks include bleeding requiring transfusion and/or sternotomy, injury to the phrenic nerve, need for diaphragm plication, injury to intraabdominal organs, prolonged pain, prolonged intubation and death.  The benefits could include improved control of the myasthenia, though this is not guaranteed.       Necessary Preop Tests & Appointments: Preoperative assessment clinic    Regional Anesthesia Plan: Intraoperative intercostal nerve block    Anticoagulation Plan: Prophylactic Lovenox     All questions were answered and Guero Collins and present family were in agreement with the plan.     I appreciate the opportunity to participate in the care of your patient and will keep you updated.        Alexander Campoverde MD

## 2023-04-04 NOTE — PROGRESS NOTES
THORACIC SURGERY FOLLOW UP VISIT     I saw Guero Collins in follow-up today. The clinical summary follows:      PREOP DIAGNOSIS   Thymic mass     PROCEDURE   Right anterior thoracotomy, biopsy mediastinal mass     DATE OF PROCEDURE  2/17/2023     HISTOPATHOLOGY   Atypical aggregates of T-lineage cells in a background of extensive fibrosis and necrosis     This biopsy contains fragments of viable lymphoid tissue, which is composed of T-lineage cells with a maturing T-cell/thymocyte immunophenotype and shows an overall lobular architecture, including epithelial meshworks. These features, in conjunction with the flow cytometry findings, could be compatible with benign thymic tissue.     However, the presence of extensive necrosis and fibrosis raises concern for a neoplastic process such as T-lymphoblastic lymphoma (or another malignancy). Necrotic tissue cannot be reliably evaluated morphologically or by immunostains or flow cytometry. Thus, although this biopsy is not diagnostic of malignancy, the overall findings are worrisome and additional workup is recommended.    Bone marrow biopsy 3/15/2023:  No evidence of cancer. Flow cytometry no evidence of non-Hodgkin lymphoma or leukemia     PET scan 3/10/2023:  Anterior mediastinal mass 3.7 x 6.2 x 6 cm, SUVmax 6.08        CT-guided biopsy 1/9/2023:  Scant necrotic tissue with clusters of lymphoid and fibrous tissue - nondiagnostic        MR Brain 1/7/2023:   Normal     Testicular ultrasound 1/7/2023:  Normal     CT chest 1/6/2023:    9.7 x 6.8 x 6.5 cm     Labs 1/6/2023:  Beta-HCG:  Normal  AFP:  Normal  LDH: 663    ETOH:  Occasional  TOB:  Ages [age] 1/2 pack per day, then vaping for 2-3 years. Quit about 2 years ago.    Daily marijuana use ages 17 - 27. Quit 6 months     BP (!) 140/67   Pulse 65   Temp 98.6  F (37  C)   Resp 16   Wt 103 kg (227 lb)   SpO2 100%   BMI 34.52 kg/m           SUBJECTIVE   Mr. Collins is doing well. The tumor board recommends to proceed  with resection.     From a personal perspective, he is here with his wife. He is self-employed. 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      IMPRESSION   (J98.59) Mediastinal mass  (primary encounter diagnosis)     Guero Collins is a 27 year old man who has recovered well after undergoing a  right anterior thoracotomy, biopsy mediastinal mass on 2/17/2023. The differential includes lymphoma or thymoma.     PLAN  I spent22 min on the date of the encounter in chart review, patient visit, review of tests, documentation and/or discussion with other providers about the issues documented above. I reviewed the plan as follows:    Procedure planned: Robot-assisted thoracoscopic mediastinal mass resection    I discussed the risks and benefits of the procedure.  The risks include bleeding requiring transfusion and/or sternotomy, injury to the phrenic nerve, need for diaphragm plication, injury to intraabdominal organs, prolonged pain, prolonged intubation and death.  The benefits could include improved control of the myasthenia, though this is not guaranteed.       Necessary Preop Tests & Appointments: Preoperative assessment clinic    Regional Anesthesia Plan: Intraoperative intercostal nerve block    Anticoagulation Plan: Prophylactic Lovenox     All questions were answered and Guero Collins and present family were in agreement with the plan.     I appreciate the opportunity to participate in the care of your patient and will keep you updated.        Alexander Campoverde MD

## 2023-04-04 NOTE — NURSING NOTE
"Oncology Rooming Note    April 4, 2023 2:10 PM   Guero Collins is a 27 year old male who presents for:    Chief Complaint   Patient presents with     Blood Draw     Labs drawn via  by rn in lab. VS taken.     Oncology Clinic Visit     RETURN - MEDIASTINAL MASS     Initial Vitals: BP (!) 140/67   Pulse 65   Temp 98.6  F (37  C)   Resp 16   Wt 103 kg (227 lb)   SpO2 100%   BMI 34.52 kg/m   Estimated body mass index is 34.52 kg/m  as calculated from the following:    Height as of 3/7/23: 1.727 m (5' 7.99\").    Weight as of this encounter: 103 kg (227 lb). Body surface area is 2.22 meters squared.  No Pain (0) Comment: Data Unavailable   No LMP for male patient.  Allergies reviewed: Yes  Medications reviewed: Yes    Medications: Medication refills not needed today.  Pharmacy name entered into [employer]:    [id number] - [place], [place] - [address] [address] [address] [employer] - [place] [address]    Clinical concerns: No new clinical concerns other than reason for visit today.        Nicole Higgins CMA            "

## 2023-04-04 NOTE — NURSING NOTE
Chief Complaint   Patient presents with     Blood Draw     Labs drawn via  by rn in lab. VS taken.     No labs ordered. Blue, Green, and purple tubes sent to the lab. Labs collected from venipuncture by RN. Vitals taken. Checked in for appointment(s).    Darin Rose RN

## 2023-04-12 ENCOUNTER — LAB (OUTPATIENT)
Dept: LAB | Facility: CLINIC | Age: 28
End: 2023-04-12
Attending: INTERNAL MEDICINE
Payer: COMMERCIAL

## 2023-04-12 DIAGNOSIS — D76.1: ICD-10-CM

## 2023-04-12 DIAGNOSIS — J98.59 MEDIASTINAL MASS: ICD-10-CM

## 2023-04-12 LAB
FASTING STATUS PATIENT QL REPORTED: NO
FERRITIN SERPL-MCNC: 31 NG/ML (ref 31–409)
TRIGL SERPL-MCNC: 352 MG/DL

## 2023-04-12 PROCEDURE — 82728 ASSAY OF FERRITIN: CPT

## 2023-04-12 PROCEDURE — 84478 ASSAY OF TRIGLYCERIDES: CPT

## 2023-04-12 PROCEDURE — 87799 DETECT AGENT NOS DNA QUANT: CPT

## 2023-04-12 PROCEDURE — 36415 COLL VENOUS BLD VENIPUNCTURE: CPT

## 2023-04-12 NOTE — NURSING NOTE
Chief Complaint   Patient presents with     Lab Only     Labs drawn via  by RN.      Dora Bowser, RN

## 2023-04-13 ENCOUNTER — TELEPHONE (OUTPATIENT)
Dept: SURGERY | Facility: CLINIC | Age: 28
End: 2023-04-13
Payer: COMMERCIAL

## 2023-04-13 LAB — EBV DNA # SPEC NAA+PROBE: NOT DETECTED COPIES/ML

## 2023-04-13 NOTE — TELEPHONE ENCOUNTER
Called pt to offer 4/20 as POSSIBLE surgery date (waiting on approval) with Dr. Campoverde and got no answer. Left voicemail message with direct call back number [phone].    Radha Michele on 4/13/2023 at 8:58 AM

## 2023-04-14 NOTE — TELEPHONE ENCOUNTER
Patient Name: Yin Law  : 1937    MRN: [id number]                              Today's Date: 3/3/2022       Admit Date: 2022    Visit Dx:     ICD-10-CM ICD-9-CM   1. Flash pulmonary edema (HCC)  J81.0 518.4   2. Acute respiratory failure with hypoxia and hypercapnia (HCC)  J96.01 518.81    J96.02    3. Acute on chronic congestive heart failure, unspecified heart failure type (HCC)  I50.9 428.0   4. Other iron deficiency anemia  D50.8 280.8     Patient Active Problem List   Diagnosis   • Paroxysmal atrial fibrillation (HCC)   • COPD on home oxygen (CMS/HCC)   • Primary hypertension   • Rheumatoid arthritis (HCC)   • Anxiety and depression   • Wound of right leg   • Anemia   • Severe malnutrition (CMS/HCC)   • Hip fracture (HCC)   • Mixed hyperlipidemia   • Heart valve disease   • Flash pulmonary edema (HCC)   • Bilateral pleural effusion   • Elevated troponin   • Leukocytosis   • Atrial fibrillation with rapid ventricular response (HCC)   • Acute on chronic respiratory failure with hypoxia and hypercapnia (HCC)   • Hypothyroidism (acquired)     Past Medical History:   Diagnosis Date   • Accelerated hypertension 2021   • Acute diastolic CHF (CMS/HCC) 2021   • Anxiety and depression    • Arrhythmia     A-FIB   • Asthma    • Chicken pox    • COPD (chronic obstructive pulmonary disease) (HCC)    • Elevated troponin 2021   • Hyperlipidemia    • Hypertension    • Measles    • Menopause    • Multifocal pneumonia 2021   • Osteoporosis    • Pleural effusion, bilateral 2021   • Pneumonia due to infectious organism 2021   • Rheumatoid arthritis (HCC)    • Rheumatoid arthritis (HCC)    • Sepsis (HCC) 2021   • Tobacco abuse      Past Surgical History:   Procedure Laterality Date   • APPENDECTOMY     • CARPAL TUNNEL RELEASE Left    • CATARACT EXTRACTION, BILATERAL     • COLON SURGERY     • COLONOSCOPY     • ENDOSCOPY N/A 3/2/2022    Procedure: ESOPHAGOGASTRODUODENOSCOPY;   Spoke with patient to schedule procedure with Dr. Campoverde   Procedure was scheduled on 4/20/23 at [facility] OR  Patient will have H&P with PAC video    Patient is aware a COVID-19 test is needed before their procedure.   (Patient is aware IP/Thoracic are still requiring COVID-19 test)     Patient will complete a PCR COVID-19 test due to inpatient status, patient will schedule at: TBD if this is still needed?    Patient is aware a / is needed day of surgery.   Surgery Letter was sent via [employer],     Patient has my direct contact information for any further questions.    Surgeon: Sammy Lyles MD;  Location: [facility] ENDOSCOPY;  Service: Gastroenterology;  Laterality: N/A;   • GALLBLADDER SURGERY     • HIP HEMIARTHROPLASTY Left 2/7/2022    Procedure: HIP HEMIARTHROPLASTY LEFT;  Surgeon: Napoleon Powell Jr., MD;  Location: [facility] OR;  Service: Orthopedics;  Laterality: Left;   • HYSTERECTOMY  1971   • TONSILLECTOMY        General Information     Row Name 03/03/22 1428          OT Time and Intention    Document Type therapy note (daily note)  -     Mode of Treatment occupational therapy  -     Row Name 03/03/22 1428          General Information    Patient Profile Reviewed yes  -SHIVAM     Existing Precautions/Restrictions fall; cardiac; left; hip, posterior; oxygen therapy device and L/min; other (see comments)  LLE WBAT, posterior hip precautions  -SHIVAM     Barriers to Rehab medically complex  -     Row Name 03/03/22 1428          Cognition    Orientation Status (Cognition) oriented x 3  -     Row Name 03/03/22 1428          Safety Issues, Functional Mobility    Safety Issues Affecting Function (Mobility) insight into deficits/self-awareness; judgment; problem-solving; safety precaution awareness; safety precautions follow-through/compliance; sequencing abilities  -SHIVAM     Impairments Affecting Function (Mobility) balance; endurance/activity tolerance; postural/trunk control; range of motion (ROM); shortness of breath; strength  -           User Key  (r) = Recorded By, (t) = Taken By, (c) = Cosigned By    Initials Name Provider Type    Katelynn Park OT Occupational Therapist                 Mobility/ADL's     Row Name 03/03/22 1430          Bed Mobility    Comment (Bed Mobility) Pt declined transfer to EOB or OOB activity d/t fatigue  -     Row Name 03/03/22 1430          Activities of Daily Living    BADL Assessment/Intervention grooming  -     Row Name 03/03/22 1430          Grooming Assessment/Training    [place] (Grooming) wash face, hands; set up  -SHIVAM      Position (Grooming) sitting up in bed  -           User Key  (r) = Recorded By, (t) = Taken By, (c) = Cosigned By    Initials Name Provider Type    Katelynn Park OT Occupational Therapist               Obj/Interventions     [facility] Name 03/03/22 [facility]          Shoulder (Therapeutic Exercise)    Shoulder (Therapeutic Exercise) AROM (active range of motion)  -     Shoulder AROM (Therapeutic Exercise) bilateral; flexion; extension; horizontal aBduction/aDduction; scapular protraction; scapular retraction; sitting; 10 repetitions  -     Row Name 03/03/22 1431          Elbow/Forearm (Therapeutic Exercise)    Elbow/Forearm (Therapeutic Exercise) AROM (active range of motion)  -     Elbow/Forearm AROM (Therapeutic Exercise) bilateral; flexion; extension; sitting; 10 repetitions  -     Row Name 03/03/22 [facility]          Motor Skills    Motor Skills therapeutic exercise  -[facility] Name 03/03/22 [facility]          Therapeutic Exercise    Therapeutic Exercise shoulder; elbow/forearm  -           User Key  (r) = Recorded By, (t) = Taken By, (c) = Cosigned By    Initials Name Provider Type    Katelynn Park OT Occupational Therapist               Goals/Plan    No documentation.                Clinical Impression     [facility] Name 03/03/22 [facility]          Pain Assessment    Additional Documentation Pain Scale: FACES Pre/Post-Treatment (Group)  -[facility] Name 03/03/22 [facility]          Pain Scale: FACES Pre/Post-Treatment    Pain: FACES Scale, Pretreatment 0-->no hurt  -     Posttreatment Pain Rating 0-->no hurt  -[facility] Name 03/03/22 [facility]          Plan of Care Review    Plan of Care Reviewed With patient  -SHIVAM     Progress no change  -     Outcome Summary Pt limited by increased fatigue this date, unable to sleep well last night. Pt declined OOB activity, performed face/hand washing with CHURCH and gave good effort for UE TE at bed level. OT encouraged incentive spirometer use.Continue per POC.  -     Row Name 03/03/22  1432          Therapy Plan Review/Discharge Plan (OT)    Anticipated Discharge Disposition (OT) inpatient rehabilitation facility  -     Row Name 03/03/22 1432          Vital Signs    O2 Delivery Pre Treatment nasal cannula  -SHIVAM     O2 Delivery Intra Treatment nasal cannula  -SHIVAM     O2 Delivery Post Treatment nasal cannula  -SHIVAM     Pre Patient Position Supine  -SHIVAM     Intra Patient Position Sitting  sitting up in bed  -SHIVAM     Post Patient Position Supine  -SHIVAM     Row Name 03/03/22 1432          Positioning and Restraints    Pre-Treatment Position in bed  -SHIVAM     Post Treatment Position bed  -SHIVAM     In Bed notified nsg; fowlers; call light within reach; encouraged to call for assist; exit alarm on  -SHIVAM           User Key  (r) = Recorded By, (t) = Taken By, (c) = Cosigned By    Initials Name Provider Type    Katelynn Park OT Occupational Therapist               Outcome Measures     Row Name 03/03/22 1435          How much help from another is currently needed...    Putting on and taking off regular lower body clothing? 1  -SHIVAM     Bathing (including washing, rinsing, and drying) 2  -SHIVAM     Toileting (which includes using toilet bed pan or urinal) 2  -SHIVAM     Putting on and taking off regular upper body clothing 3  -SHIVAM     Taking care of personal grooming (such as brushing teeth) 3  -SHIVAM     Eating meals 3  -SHIVAM     AM-PAC 6 Clicks Score (OT) 14  -SHIVAM     Row Name 03/03/22 0800          How much help from another person do you currently need...    Turning from your back to your side while in flat bed without using bedrails? 3  -HG     Moving from lying on back to sitting on the side of a flat bed without bedrails? 3  -HG     Moving to and from a bed to a chair (including a wheelchair)? 3  -HG     Standing up from a chair using your arms (e.g., wheelchair, bedside chair)? 3  -HG     Climbing 3-5 steps with a railing? 2  -HG     To walk in hospital room? 3  -HG     AM-PAC 6 Clicks Score (PT) 17  -HG     Row Name  03/03/22 1435          Functional Assessment    Outcome Measure Options AM-PAC 6 Clicks Daily Activity (OT)  -SHIVAM           User Key  (r) = Recorded By, (t) = Taken By, (c) = Cosigned By    Initials Name Provider Type    Mellisa Mendoza, RN Registered Nurse    Katelynn Park OT Occupational Therapist                Occupational Therapy Education                 Title: PT OT SLP Therapies (Done)     Topic: Occupational Therapy (Done)     Point: ADL training (Done)     Description:   Instruct learner(s) on proper safety adaptation and remediation techniques during self care or transfers.   Instruct in proper use of assistive devices.              Learning Progress Summary           Patient Acceptance, E,TB,D, VU,NR by  at 3/1/2022 0834                   Point: Home exercise program (Done)     Description:   Instruct learner(s) on appropriate technique for monitoring, assisting and/or progressing therapeutic exercises/activities.              Learning Progress Summary           Patient Acceptance, E,D, DU by SHIVAM at 3/3/2022 1436    Comment: UE HEP; incentive spirometer use/goal setting                   Point: Precautions (Done)     Description:   Instruct learner(s) on prescribed precautions during self-care and functional transfers.              Learning Progress Summary           Patient Acceptance, E,TB,D, VU,NR by  at 3/1/2022 0834                   Point: Body mechanics (Done)     Description:   Instruct learner(s) on proper positioning and spine alignment during self-care, functional mobility activities and/or exercises.              Learning Progress Summary           Patient Acceptance, E,TB,D, VU,NR by  at 3/1/2022 0834                               User Key     Initials Effective Dates Name Provider Type Discipline     06/16/21 -  Salud Felton OT Occupational Therapist OT    SHIVAM 06/16/21 -  Katelynn Moreno OT Occupational Therapist OT              OT Recommendation and Plan     Plan of Care  Review  Plan of Care Reviewed With: patient  Progress: no change  Outcome Summary: Pt limited by increased fatigue this date, unable to sleep well last night. Pt declined OOB activity, performed face/hand washing with CHURCH and gave good effort for UE TE at bed level. OT encouraged incentive spirometer use.Continue per POC.     Time Calculation:    Time Calculation- OT     Row Name 03/03/22 1400             Time Calculation- OT    OT Start Time 1400  -SHIVAM      OT Received On 03/03/22  -SHIVAM              Timed Charges    70132 - OT Therapeutic Exercise Minutes 20  -SHIVAM      38378 - OT Self Care/Mgmt Minutes 5  -SHIVAM              Total Minutes    Timed Charges Total Minutes 25  -SHIVAM       Total Minutes 25  -SHIVAM            User Key  (r) = Recorded By, (t) = Taken By, (c) = Cosigned By    Initials Name Provider Type    Katelynn Park OT Occupational Therapist              Therapy Charges for Today     Code Description Service Date Service Provider Modifiers Qty    26951288188 HC OT THER PROC EA 15 MIN 3/3/2022 Katelynn Moreno OT GO 2               Katelynn Moreno OT  3/3/2022

## 2023-04-16 NOTE — TELEPHONE ENCOUNTER
FUTURE VISIT INFORMATION      SURGERY INFORMATION:    Date: 23    Location: [facility] or    Surgeon:  Alexander Campoverde MD    Anesthesia Type:  General    Procedure: Robot-assisted bilateral thoracoscopic anterior mediastinal mass resection    Consult: ov 23    RECORDS REQUESTED FROM:       Primary Care Provider: Bharti    Most recent EKG+ Tracin23    Most recent ECHO: 23    Most recent PFT's: 23

## 2023-04-19 ENCOUNTER — PRE VISIT (OUTPATIENT)
Dept: SURGERY | Facility: CLINIC | Age: 28
End: 2023-04-19

## 2023-04-19 ENCOUNTER — VIRTUAL VISIT (OUTPATIENT)
Dept: SURGERY | Facility: CLINIC | Age: 28
End: 2023-04-19
Payer: COMMERCIAL

## 2023-04-19 ENCOUNTER — ANESTHESIA EVENT (OUTPATIENT)
Dept: SURGERY | Facility: CLINIC | Age: 28
DRG: 828 | End: 2023-04-19
Payer: COMMERCIAL

## 2023-04-19 DIAGNOSIS — Z01.818 PREOP EXAMINATION: Primary | ICD-10-CM

## 2023-04-19 DIAGNOSIS — J98.59 MEDIASTINAL MASS: ICD-10-CM

## 2023-04-19 LAB
CULTURE HARVEST COMPLETE DATE: NORMAL

## 2023-04-19 PROCEDURE — 99215 OFFICE O/P EST HI 40 MIN: CPT | Mod: 24 | Performed by: CLINICAL NURSE SPECIALIST

## 2023-04-19 RX ORDER — SODIUM CHLORIDE, SODIUM LACTATE, POTASSIUM CHLORIDE, CALCIUM CHLORIDE 600; 310; 30; 20 MG/100ML; MG/100ML; MG/100ML; MG/100ML
INJECTION, SOLUTION INTRAVENOUS CONTINUOUS
Status: CANCELLED | OUTPATIENT
Start: 2023-04-19

## 2023-04-19 RX ORDER — CELECOXIB 200 MG/1
200 CAPSULE ORAL ONCE
Status: CANCELLED | OUTPATIENT
Start: 2023-04-19 | End: 2023-04-19

## 2023-04-19 RX ORDER — CHLORHEXIDINE GLUCONATE ORAL RINSE 1.2 MG/ML
15 SOLUTION DENTAL ONCE
Status: CANCELLED | OUTPATIENT
Start: 2023-04-19 | End: 2023-04-19

## 2023-04-19 RX ORDER — LIDOCAINE 40 MG/G
CREAM TOPICAL
Status: CANCELLED | OUTPATIENT
Start: 2023-04-19

## 2023-04-19 RX ORDER — GABAPENTIN 300 MG/1
300 CAPSULE ORAL
Status: CANCELLED | OUTPATIENT
Start: 2023-04-19

## 2023-04-19 RX ORDER — ACETAMINOPHEN 325 MG/1
975 TABLET ORAL ONCE
Status: CANCELLED | OUTPATIENT
Start: 2023-04-19 | End: 2023-04-19

## 2023-04-19 ASSESSMENT — ENCOUNTER SYMPTOMS
DYSRHYTHMIAS: 0
SEIZURES: 0

## 2023-04-19 ASSESSMENT — LIFESTYLE VARIABLES: TOBACCO_USE: 1

## 2023-04-19 NOTE — H&P
Pre-Operative H & P     CC:  Preoperative exam to assess for increased cardiopulmonary risk while undergoing surgery and anesthesia.    Date of Encounter: 4/19/2023  Primary Care Physician:  No Ref-Primary, Physician     Reason for visit:   Encounter Diagnoses   Name Primary?     Preop examination Yes     Mediastinal mass        YOBANY Collins is a 27 year old male who presents for pre-operative H & P in preparation for  Procedure Information     Case: [id number] Date/Time: 04/20/23 1310    Procedure: Robot-assisted bilateral thoracoscopic anterior mediastinal mass resection (Bilateral: Chest)    Anesthesia type: General    Diagnosis: Mediastinal mass [J98.59]    Pre-op diagnosis: Mediastinal mass [J98.59]    Location:  OR  /  OR    Providers: Alexander Campoverde MD        History is obtained from the patient and chart review    Patient who has been recently followed by medical oncology and Dr. Campoverde. He was found to have a mediastinal mass and is s/p right anterior thoracotomy, biopsy mediastinal mass on 2/17/2023. Per notes pathology revealed atypical aggregates of T-lineage cells in a background of extensive fibrosis and necrosis. This remains concerning for malignancy and further work up continues. He has been counseled for above surgical procedure by Dr. Campoverde     Patient's history is otherwise significant for obesity, previous smoking, snoring, history of COVID, and anxiety.      Hx of abnormal bleeding or anti-platelet use: Denies      Past Medical History  Past Medical History:   Diagnosis Date     Anxiety      History of COVID-19      Mediastinal mass      Obesity      Pneumonia of right lower lobe due to infectious organism        Past Surgical History  Past Surgical History:   Procedure Laterality Date     BIOPSY  01/09/2023    lung     THORACOTOMY ANTERIOR Right 2/17/2023    Procedure: Right anterior thoracotomy, biopsy mediastinal mass;  Surgeon: Alexander Campoverde MD;  Location: [facility]      wisdom teeth extraction         Prior to Admission Medications  Current Outpatient Medications   Medication Sig Dispense Refill     multivitamin w/minerals (THERA-VIT-M) tablet Take 1 tablet by mouth daily (Patient not taking: Reported on 2023)         Allergies  No Known Allergies    Social History  Social History     Socioeconomic History     Marital status: Single     Spouse name: Not on file     Number of children: Not on file     Years of education: Not on file     Highest education level: Not on file   Occupational History     Not on file   Tobacco Use     Smoking status: Former     Types: Cigarettes, Vaping Device     Quit date: 2021     Years since quittin.3     Passive exposure: Past     Smokeless tobacco: Never     Tobacco comments:     Quit marijuana about 6 mo ago. 23 PO   Vaping Use     Vaping status: Not on file   Substance and Sexual Activity     Alcohol use: Not Currently     Drug use: Not Currently     Types: Marijuana     Comment: None in the last 6 months     Sexual activity: Not on file   Other Topics Concern     Not on file   Social History Narrative     Not on file     Social Determinants of Health     Financial Resource Strain: Not on file   Food Insecurity: Not on file   Transportation Needs: Not on file   Physical Activity: Not on file   Stress: Not on file   Social Connections: Not on file   Intimate Partner Violence: Not on file   Housing Stability: Not on file       Family History  Family History   Problem Relation Age of Onset     Anesthesia Reaction No family hx of      Deep Vein Thrombosis (DVT) No family hx of        Review of Systems  The complete review of systems is negative other than noted in the HPI or here.   Anesthesia Evaluation   Pt has had prior anesthetic. Type: General.    No history of anesthetic complications       ROS/MED HX  ENT/Pulmonary: Comment: Mediastinal mass    (+) SAMUEL risk factors, snores loudly, tobacco use, Past use,  (-) sleep apnea and  recent URI   Neurologic:  - neg neurologic ROS  (-) no seizures and no CVA   Cardiovascular:     (+) -----Previous cardiac testing   Echo: Date: 1/8/23 Results:    Stress Test: Date: Results:    ECG Reviewed: Date: 1/6/23 Results:  Sinus rhythm with sinus arrhythmia, incomplete RBBB  Cath: Date: Results:   (-) taking anticoagulants/antiplatelets, NOEL and arrhythmias   METS/Exercise Tolerance: >4 METS    Hematologic:  - neg hematologic  ROS     Musculoskeletal:  - neg musculoskeletal ROS     GI/Hepatic:  - neg GI/hepatic ROS  (-) GERD   Renal/Genitourinary:  - neg Renal ROS     Endo:     (+) Obesity,     Psychiatric/Substance Use:     (+) psychiatric history anxiety     Infectious Disease:     (+) MRSA,     Malignancy:  - neg malignancy ROS     Other:  - neg other ROS          Virtual visit -  No vitals were obtained    Physical Exam  Constitutional: Awake, alert, no apparent distress, and appears stated age.  HENT: Normocephalic  Respiratory: non labored breathing; no cough   Neurologic: Oriented to name, place and time.   Neuropsychiatric: Calm, cooperative. Normal affect.   SKIN: Patient showed me virtually an area on his left upper thigh with 2 red linear marks, dry, non-irritated, does not appear raised, which he thinks might be a rash versus stretch marks. Denies itching. Encouraged him to monitor for changes.     Prior Labs/Diagnostic Studies   All labs and imaging personally reviewed   Lab Results   Component Value Date    WBC 6.3 03/15/2023     Lab Results   Component Value Date    RBC 5.04 03/15/2023     Lab Results   Component Value Date    HGB 14.6 03/15/2023     Lab Results   Component Value Date    HCT 44.5 03/15/2023     Lab Results   Component Value Date    MCV 88 03/15/2023     Lab Results   Component Value Date    MCH 29.0 03/15/2023     Lab Results   Component Value Date    MCHC 32.8 03/15/2023     Lab Results   Component Value Date    RDW 13.2 03/15/2023     Lab Results   Component Value Date      03/15/2023     Last Comprehensive Metabolic Panel:  Sodium   Date Value Ref Range Status   03/10/2023 138 136 - 145 mmol/L Final     Potassium   Date Value Ref Range Status   03/10/2023 4.1 3.4 - 5.3 mmol/L Final   2023 4.2 3.5 - 5.0 mmol/L Final     Chloride   Date Value Ref Range Status   03/10/2023 103 98 - 107 mmol/L Final   2023 104 98 - 107 mmol/L Final     Carbon Dioxide (CO2)   Date Value Ref Range Status   03/10/2023 25 22 - 29 mmol/L Final   2023 26 22 - 31 mmol/L Final     Anion Gap   Date Value Ref Range Status   03/10/2023 10 7 - 15 mmol/L Final   2023 9 5 - 18 mmol/L Final     Glucose   Date Value Ref Range Status   03/10/2023 104 (H) 70 - 99 mg/dL Final   03/10/2023 93 70 - 99 mg/dL Final     Urea Nitrogen   Date Value Ref Range Status   03/10/2023 13.2 6.0 - 20.0 mg/dL Final   2023 12 8 - 22 mg/dL Final     Creatinine   Date Value Ref Range Status   03/10/2023 0.73 0.67 - 1.17 mg/dL Final     GFR Estimate   Date Value Ref Range Status   03/10/2023 >90 >60 mL/min/1.73m2 Final     Comment:     eGFR calculated using  CKD-EPI equation.     Calcium   Date Value Ref Range Status   03/10/2023 9.7 8.6 - 10.0 mg/dL Final     Bilirubin Total   Date Value Ref Range Status   03/10/2023 0.2 <=1.2 mg/dL Final     Alkaline Phosphatase   Date Value Ref Range Status   03/10/2023 67 40 - 129 U/L Final     ALT   Date Value Ref Range Status   03/10/2023 18 10 - 50 U/L Final     AST   Date Value Ref Range Status   03/10/2023 13 10 - 50 U/L Final   3/10/23   INR 0.96  3/15/23  PTT 29  23  Ferritin 31    EK23 Sinus rhythm with sinus arrrhythmia, incomplete RBBB    Echocardiogram 23  Interpretation Summary  Global and regional left ventricular function is normal with an EF of 60-65%.  Global right ventricular function is normal. The right ventricle is normal  size.  No significant valvular abnormalities.  No pericardial effusion is present.  There is no prior study  for direct comparison.    CT Soft tissue 3/10/23                                                        Impression:      1. No evidence of hypermetabolic cervical adenopathy. Relatively  symmetric mild uptake in prominent bilateral cervical level II nodes  is favored reactive.     2. Please refer to the whole body PET CT performed as a separate  report, for the findings of the remainder of the body.    PET Oncology 3/10/23                                             IMPRESSION:      1. Interval reduction in size of mixed cystic and solid anterior  mediastinal mass, measuring up to 6.2 cm, previously 10.5 cm. The mass  demonstrates scattered areas of hypermetabolic FDG uptake  predominantly centered within the solid components, with intensity  markedly above the liver (SUV max 6.08). No other definite sites of  disease identified.     2. Mild diffusely increased uptake in the axial bone marrow in a  heterogenous pattern without CT correlate is indeterminate, probably  reactive. Attention on follow-up/correlation with bone marrow biopsy.     3. Please refer to same-day high resolution neuroradiology PET/CT for  findings regarding the head and neck.        Latest Ref Rng & Units 1/12/2023     1:59 PM   PFT   FVC L 2.68     FEV1 L 2.18     FVC% % 50     FEV1% % 48           The patient's records and results personally reviewed by this provider.     Outside records reviewed from: Care Everywhere      Assessment      Guero Collins is a 27 year old male seen as a PAC referral for risk assessment and optimization for anesthesia.    Plan/Recommendations  Pt will be optimized for the proposed procedure.  See below for details on the assessment, risk, and preoperative recommendations    NEUROLOGY  - No history of TIA, CVA or seizure    -Post Op delirium risk factors:  No risk identified    ENT  - No current airway concerns.  Will need to be reassessed day of surgery. Last airway notes 2/17/23  Mallampati: Unable to  "assess  TM: Unable to assess    CARDIAC  - No history of CAD, Hypertension and Afib.   - METS (Metabolic Equivalents)>4  Patient performs 4 or more METS exercise without symptoms      RCRI: 0.9% risk of serious cardiac events    PULMONARY  - Intermediate risk for Obstructive Sleep Apnea  Snores    - Anterior mediastinal mass - procedure as above  Right chest incision reported well healed. Some soreness when sneezing  Denies cough or shortness of breath    - Tobacco History      History   Smoking Status     Former     Types: Cigarettes, Vaping Device     Quit date: 12/2021   Smokeless Tobacco     Never       GI: Rare use of TUMS for heartburn   PONV Medium Risk  Total Score: 2           1 AN PONV: Patient is not a current smoker    1 AN PONV: Intended Post Op Opioids        /RENAL  - Baseline Creatinine 0.73    ENDOCRINE    - BMI: Estimated body mass index is 34.52 kg/m  as calculated from the following:    Height as of 3/7/23: 1.727 m (5' 7.99\").    Weight as of 4/4/23: 103 kg (227 lb).  Obesity (BMI >30)  - No history of Diabetes Mellitus Last random blood glucose      HEME  VTE Low Risk 0.5%            Total Score: 2    VTE: Male      Denies history of blood clots  Denies history of blood transfusion   Last Hgb: 14.6  Last type and screen drawn on 1/23/23-antibody screen negative     MSK: Patient is not frail    PSYCH  - Anxiety. No meds    Enhanced Recovery Pathway initiated    Final plan will be decided by the assigned anesthesia provider on the date of service.        The patient is optimized for their procedure. AVS with information on surgery time/arrival time, meds and NPO status given by nursing staff. No further diagnostic testing indicated.    Please refer to the physical examination documented by the anesthesiologist in the anesthesia record on the day of surgery.    Video-Visit Details    Type of service:  Video Visit    Provider received verbal consent for a Video Visit from the patient? Yes "   Video Start Time: 7:42am   Video End Time: 7:54am     Originating Location (pt. Location): Home    Distant Location (provider location):  Off-site  Mode of Communication:  Video Conference via [employer]  On the day of service:     Prep time: 13 minutes  Visit time: 12 minutes  Documentation time: 18 minutes  ------------------------------------------  Total time: 43 minutes      MEENU Aly CNS  Preoperative Assessment Center  [facility]  Clinic and [facility] Center  Phone: [phone]  Fax: [phone]

## 2023-04-19 NOTE — PATIENT INSTRUCTIONS
Preparing for Your Surgery      Name:  Guero Collins   MRN:  [id number]   :  1995   Today's Date:  2023       Arriving for surgery:  Surgery date:  23  Arrival time:  11:10AM     Surgeries and procedures: Adult patients can have 2 visitors all through the surgery process.     Visiting hours: 8 a.m. to 8:30 p.m.     Hospital: Adult patients and children under age 18 can have 4 visitor at a time     No visitors under the age of 5 are allowed for hospital patients.  Double occupancy rooms: Patients can have only two visitors at a time.     Patients with disabilities: Can have a support person with them (family member, service provider     Or someone well informed about their needs) plus the allowed number of visitors     Patients confirmed or suspected to have symptoms of COVID 19 or flu:     No visitors allowed for adult patients.   Children (under age 18) can have 1 named visitor.     People who are sick or showing symptoms of COVID 19 or flu:    Are not allowed to visit patients--we can only make exceptions in special situations.       Please follow these guidelines for your visit:   Arrive wearing a mask over your mouth and nose; we will give you a medical mask to wear    If you arrive wearing a cloth mask.   Keep it on during your entire visit, even when in patient's room.   If you don't wear a mask we'll ask you to leave.     Clean your hands with alcohol hand . Do this when you arrive at and leave the building and patient room,    And again after you touch your mask or anything in the room.     You can t visit if you have a fever, cough, shortness of breath, muscle aches, headaches, sore throat    Or diarrhea      Stay 6 feet away from others during your visit and between visits     Go directly to and from the room you are visiting.     Stay in the patient s room during your visit. Limit going to other places in the hospital as much as possible     Leave bags and jackets at home or in  the car.     For everyone s health, please don t come and go during your visit. That includes for smoking   during your visit.     Please come to:     [facility] [address]  [address] [place]  [address]    - ? parking is available in front of the hospital    -   Parking is available in the Patient Visitor Ramp on [address] and [facility].     -   When entering the hospital stop at Registration, from there you will be directed to the 3rd floor Surgery waiting room.     -   Please ask if you need an escort or a wheelchair to the Surgery Waiting Room.  Preop number- [phone]      What can I eat or drink?  -  You may eat and drink normally up to 8 hours prior to arrival time. (Until 4/20/23, 3:00AM)  -  You may have clear liquids until 2 hours prior to arrival time. (Until 4/20/23, 9:00AM)      OPTIMAL RECOVERY AFTER SURGERY        Begin hydrating yourself by drinking at least 8-10 glasses of clear liquids for 24 hours before surgery:      Suggested clear liquids:   Water    Clear Juices   Clear Broth   Non- carbonated beverages    (Crystal Light or Torrey Aid)   Sodas    (Sprite, 7 up, ginger ale, seltzer)   Gatorade              Drink clear liquids up until 2 hours prior to arrival for surgery.      We would like you to purchase a drink such as Gatorade or Ensure Clear (not the milkshake type).  Drink this before bedtime and the morning of surgery drink between 8-10 ounces or until you feel hydrated.        Keeping well hydrated leads to your veins being plump, you wake up faster, and you are less likely to be nauseated. Start drinking water as soon as you can after surgery and advance to clear liquids and food as tolerated.  IV fluids contain salt, drinking fluids will minimize the amount of IV fluids you need and decrease the amount of salt you get.                 The most common reason for the patient to be readmitted is dehydration.  Staying hydrated after you go home from the hospital is very important.  Ensure or Ensure Clear are good options to keep you hydrated.       -  No Alcohol for at least 24 hours before surgery.     Which medicines can I take?    Hold Aspirin for 7 days before surgery.   Hold Multivitamins for 7 days before surgery.  Hold Supplements for 7 days before surgery.  Hold Ibuprofen (Advil, Motrin) for 1 day(s) before surgery--unless otherwise directed by surgeon.  Hold Naproxen (Aleve) for 4 days before surgery.      How do I prepare myself?  - Please take 2 showers (one the night prior to surgery and one the morning of surgery) using Scrubcare or Hibiclens soap.    Use this soap only from the neck to your toes.     Leave the soap on your skin for one minute--then rinse thoroughly.      You may use your own shampoo and conditioner. No other hair products.   - Please remove all jewelry and body piercings.  - No lotions, deodorants or fragrance.  - Bring your ID and insurance card.    -If you have a Deep Brain Stimulator, Spinal Cord Stimulator, or any Neuro Stimulator device---you must bring the remote control to the hospital.        Covid testing policy as of 12/06/2022  Your surgeon will notify and schedule you for a COVID test if one is needed before surgery--please direct any questions or COVID symptoms to your surgeon      Questions or Concerns:    - For any questions regarding the day of surgery or your hospital stay, please contact the Pre Admission Nursing Office at [phone].       - If you have health changes between today and your surgery, please call your surgeon.       - For questions after surgery, please call your surgeons office.

## 2023-04-19 NOTE — PROGRESS NOTES
Guero is a 27 year old who is being evaluated via a billable video visit.      How would you like to obtain your AVS? Lisahart      Subjective   Guero is a 27 year old, presenting for the following health issues:  Pre-Op Exam (/)    HPI           Review of Systems       Physical Exam       GRACE Carpenter LPN

## 2023-04-20 ENCOUNTER — APPOINTMENT (OUTPATIENT)
Dept: GENERAL RADIOLOGY | Facility: CLINIC | Age: 28
DRG: 828 | End: 2023-04-20
Attending: THORACIC SURGERY (CARDIOTHORACIC VASCULAR SURGERY)
Payer: COMMERCIAL

## 2023-04-20 ENCOUNTER — ANESTHESIA (OUTPATIENT)
Dept: SURGERY | Facility: CLINIC | Age: 28
DRG: 828 | End: 2023-04-20
Payer: COMMERCIAL

## 2023-04-20 ENCOUNTER — HOSPITAL ENCOUNTER (INPATIENT)
Facility: CLINIC | Age: 28
LOS: 3 days | Discharge: HOME | DRG: 828 | End: 2023-04-23
Attending: THORACIC SURGERY (CARDIOTHORACIC VASCULAR SURGERY) | Admitting: THORACIC SURGERY (CARDIOTHORACIC VASCULAR SURGERY)
Payer: COMMERCIAL

## 2023-04-20 DIAGNOSIS — J98.59 MASS OF MEDIASTINUM: Primary | ICD-10-CM

## 2023-04-20 LAB
ABO/RH(D): NORMAL
ANTIBODY SCREEN: NEGATIVE
BASE EXCESS BLDA CALC-SCNC: 0 MMOL/L (ref -9.6–2)
BLD PROD TYP BPU: NORMAL
BLD PROD TYP BPU: NORMAL
BLOOD COMPONENT TYPE: NORMAL
BLOOD COMPONENT TYPE: NORMAL
CA-I BLD-MCNC: 4.6 MG/DL (ref 4.4–5.2)
CODING SYSTEM: NORMAL
CODING SYSTEM: NORMAL
CROSSMATCH: NORMAL
CROSSMATCH: NORMAL
GLUCOSE BLD-MCNC: 104 MG/DL (ref 70–99)
HCO3 BLDA-SCNC: 25 MMOL/L (ref 21–28)
HGB BLD-MCNC: 14.1 G/DL (ref 13.3–17.7)
INTERPRETATION: NORMAL
ISCN: NORMAL
ISSUE DATE AND TIME: NORMAL
ISSUE DATE AND TIME: NORMAL
LACTATE BLD-SCNC: 0.8 MMOL/L
METHODS: NORMAL
O2/TOTAL GAS SETTING VFR VENT: 60 %
PCO2 BLDA: 38 MM HG (ref 35–45)
PH BLDA: 7.41 [PH] (ref 7.35–7.45)
PO2 BLDA: 254 MM HG (ref 80–105)
POTASSIUM BLD-SCNC: 3.9 MMOL/L (ref 3.5–5)
SODIUM BLD-SCNC: 139 MMOL/L (ref 133–144)
SPECIMEN EXPIRATION DATE: NORMAL
UNIT ABO/RH: NORMAL
UNIT ABO/RH: NORMAL
UNIT NUMBER: NORMAL
UNIT NUMBER: NORMAL
UNIT STATUS: NORMAL
UNIT STATUS: NORMAL
UNIT TYPE ISBT: 5100
UNIT TYPE ISBT: 5100

## 2023-04-20 PROCEDURE — 250N000011 HC RX IP 250 OP 636: Performed by: ANESTHESIOLOGY

## 2023-04-20 PROCEDURE — 250N000011 HC RX IP 250 OP 636: Performed by: THORACIC SURGERY (CARDIOTHORACIC VASCULAR SURGERY)

## 2023-04-20 PROCEDURE — 120N000002 HC R&B MED SURG/OB UMMC

## 2023-04-20 PROCEDURE — 250N000013 HC RX MED GY IP 250 OP 250 PS 637: Performed by: SURGERY

## 2023-04-20 PROCEDURE — 272N000001 HC OR GENERAL SUPPLY STERILE: Performed by: THORACIC SURGERY (CARDIOTHORACIC VASCULAR SURGERY)

## 2023-04-20 PROCEDURE — 88307 TISSUE EXAM BY PATHOLOGIST: CPT | Mod: 26 | Performed by: PATHOLOGY

## 2023-04-20 PROCEDURE — 88307 TISSUE EXAM BY PATHOLOGIST: CPT | Mod: TC | Performed by: THORACIC SURGERY (CARDIOTHORACIC VASCULAR SURGERY)

## 2023-04-20 PROCEDURE — 258N000003 HC RX IP 258 OP 636: Performed by: SURGERY

## 2023-04-20 PROCEDURE — 88185 FLOWCYTOMETRY/TC ADD-ON: CPT | Performed by: THORACIC SURGERY (CARDIOTHORACIC VASCULAR SURGERY)

## 2023-04-20 PROCEDURE — P9016 RBC LEUKOCYTES REDUCED: HCPCS

## 2023-04-20 PROCEDURE — 250N000011 HC RX IP 250 OP 636: Performed by: SURGERY

## 2023-04-20 PROCEDURE — 88189 FLOWCYTOMETRY/READ 16 & >: CPT | Performed by: STUDENT IN AN ORGANIZED HEALTH CARE EDUCATION/TRAINING PROGRAM

## 2023-04-20 PROCEDURE — 88331 PATH CONSLTJ SURG 1 BLK 1SPC: CPT | Mod: 26 | Performed by: PATHOLOGY

## 2023-04-20 PROCEDURE — 999N000065 XR CHEST PORT 1 VIEW

## 2023-04-20 PROCEDURE — 86850 RBC ANTIBODY SCREEN: CPT | Performed by: CLINICAL NURSE SPECIALIST

## 2023-04-20 PROCEDURE — 83605 ASSAY OF LACTIC ACID: CPT

## 2023-04-20 PROCEDURE — 71045 X-RAY EXAM CHEST 1 VIEW: CPT | Mod: 26 | Performed by: RADIOLOGY

## 2023-04-20 PROCEDURE — 88341 IMHCHEM/IMCYTCHM EA ADD ANTB: CPT | Mod: 26 | Performed by: PATHOLOGY

## 2023-04-20 PROCEDURE — 999N000141 HC STATISTIC PRE-PROCEDURE NURSING ASSESSMENT: Performed by: THORACIC SURGERY (CARDIOTHORACIC VASCULAR SURGERY)

## 2023-04-20 PROCEDURE — 710N000010 HC RECOVERY PHASE 1, LEVEL 2, PER MIN: Performed by: THORACIC SURGERY (CARDIOTHORACIC VASCULAR SURGERY)

## 2023-04-20 PROCEDURE — 36415 COLL VENOUS BLD VENIPUNCTURE: CPT | Performed by: CLINICAL NURSE SPECIALIST

## 2023-04-20 PROCEDURE — 86923 COMPATIBILITY TEST ELECTRIC: CPT

## 2023-04-20 PROCEDURE — 250N000011 HC RX IP 250 OP 636: Performed by: NURSE ANESTHETIST, CERTIFIED REGISTERED

## 2023-04-20 PROCEDURE — 88331 PATH CONSLTJ SURG 1 BLK 1SPC: CPT | Mod: TC | Performed by: THORACIC SURGERY (CARDIOTHORACIC VASCULAR SURGERY)

## 2023-04-20 PROCEDURE — 258N000003 HC RX IP 258 OP 636: Performed by: NURSE ANESTHETIST, CERTIFIED REGISTERED

## 2023-04-20 PROCEDURE — 250N000009 HC RX 250: Performed by: NURSE ANESTHETIST, CERTIFIED REGISTERED

## 2023-04-20 PROCEDURE — 07TM0ZZ RESECTION OF THYMUS, OPEN APPROACH: ICD-10-PCS | Performed by: THORACIC SURGERY (CARDIOTHORACIC VASCULAR SURGERY)

## 2023-04-20 PROCEDURE — 8E0W4CZ ROBOTIC ASSISTED PROCEDURE OF TRUNK REGION, PERCUTANEOUS ENDOSCOPIC APPROACH: ICD-10-PCS | Performed by: THORACIC SURGERY (CARDIOTHORACIC VASCULAR SURGERY)

## 2023-04-20 PROCEDURE — 60521 REMOVAL OF THYMUS GLAND: CPT | Mod: 58 | Performed by: THORACIC SURGERY (CARDIOTHORACIC VASCULAR SURGERY)

## 2023-04-20 PROCEDURE — 84132 ASSAY OF SERUM POTASSIUM: CPT

## 2023-04-20 PROCEDURE — 250N000025 HC SEVOFLURANE, PER MIN: Performed by: THORACIC SURGERY (CARDIOTHORACIC VASCULAR SURGERY)

## 2023-04-20 PROCEDURE — 88342 IMHCHEM/IMCYTCHM 1ST ANTB: CPT | Mod: 26 | Performed by: PATHOLOGY

## 2023-04-20 PROCEDURE — 360N000080 HC SURGERY LEVEL 7, PER MIN: Performed by: THORACIC SURGERY (CARDIOTHORACIC VASCULAR SURGERY)

## 2023-04-20 PROCEDURE — 250N000013 HC RX MED GY IP 250 OP 250 PS 637: Performed by: CLINICAL NURSE SPECIALIST

## 2023-04-20 PROCEDURE — 370N000017 HC ANESTHESIA TECHNICAL FEE, PER MIN: Performed by: THORACIC SURGERY (CARDIOTHORACIC VASCULAR SURGERY)

## 2023-04-20 RX ORDER — ACETAMINOPHEN 325 MG/1
650 TABLET ORAL EVERY 4 HOURS PRN
Status: DISCONTINUED | OUTPATIENT
Start: 2023-04-23 | End: 2023-04-23 | Stop reason: HOSPADM

## 2023-04-20 RX ORDER — LIDOCAINE HYDROCHLORIDE 20 MG/ML
INJECTION, SOLUTION INFILTRATION; PERINEURAL PRN
Status: DISCONTINUED | OUTPATIENT
Start: 2023-04-20 | End: 2023-04-20

## 2023-04-20 RX ORDER — PROPOFOL 10 MG/ML
INJECTION, EMULSION INTRAVENOUS PRN
Status: DISCONTINUED | OUTPATIENT
Start: 2023-04-20 | End: 2023-04-20

## 2023-04-20 RX ORDER — SODIUM CHLORIDE 9 MG/ML
INJECTION, SOLUTION INTRAVENOUS CONTINUOUS
Status: DISCONTINUED | OUTPATIENT
Start: 2023-04-20 | End: 2023-04-23 | Stop reason: HOSPADM

## 2023-04-20 RX ORDER — ENOXAPARIN SODIUM 100 MG/ML
40 INJECTION SUBCUTANEOUS
Status: COMPLETED | OUTPATIENT
Start: 2023-04-20 | End: 2023-04-20

## 2023-04-20 RX ORDER — CEFAZOLIN SODIUM/WATER 2 G/20 ML
2 SYRINGE (ML) INTRAVENOUS SEE ADMIN INSTRUCTIONS
Status: DISCONTINUED | OUTPATIENT
Start: 2023-04-20 | End: 2023-04-20 | Stop reason: HOSPADM

## 2023-04-20 RX ORDER — LIDOCAINE 40 MG/G
CREAM TOPICAL
Status: DISCONTINUED | OUTPATIENT
Start: 2023-04-20 | End: 2023-04-20 | Stop reason: HOSPADM

## 2023-04-20 RX ORDER — CELECOXIB 200 MG/1
200 CAPSULE ORAL ONCE
Status: COMPLETED | OUTPATIENT
Start: 2023-04-20 | End: 2023-04-20

## 2023-04-20 RX ORDER — ALBUTEROL SULFATE 0.83 MG/ML
2.5 SOLUTION RESPIRATORY (INHALATION) EVERY 4 HOURS PRN
Status: DISCONTINUED | OUTPATIENT
Start: 2023-04-20 | End: 2023-04-21

## 2023-04-20 RX ORDER — ONDANSETRON 2 MG/ML
4 INJECTION INTRAMUSCULAR; INTRAVENOUS EVERY 30 MIN PRN
Status: DISCONTINUED | OUTPATIENT
Start: 2023-04-20 | End: 2023-04-21

## 2023-04-20 RX ORDER — FENTANYL CITRATE 50 UG/ML
25 INJECTION, SOLUTION INTRAMUSCULAR; INTRAVENOUS EVERY 5 MIN PRN
Status: DISCONTINUED | OUTPATIENT
Start: 2023-04-20 | End: 2023-04-21

## 2023-04-20 RX ORDER — CEFAZOLIN SODIUM/WATER 2 G/20 ML
2 SYRINGE (ML) INTRAVENOUS
Status: COMPLETED | OUTPATIENT
Start: 2023-04-20 | End: 2023-04-20

## 2023-04-20 RX ORDER — GINSENG 100 MG
CAPSULE ORAL
Status: DISCONTINUED | OUTPATIENT
Start: 2023-04-20 | End: 2023-04-23

## 2023-04-20 RX ORDER — FAMOTIDINE 20 MG/1
20 TABLET, FILM COATED ORAL 2 TIMES DAILY
Status: DISCONTINUED | OUTPATIENT
Start: 2023-04-20 | End: 2023-04-21

## 2023-04-20 RX ORDER — CALCIUM CARBONATE 500 MG/1
500 TABLET, CHEWABLE ORAL 4 TIMES DAILY PRN
Status: DISCONTINUED | OUTPATIENT
Start: 2023-04-20 | End: 2023-04-23 | Stop reason: HOSPADM

## 2023-04-20 RX ORDER — ACETAMINOPHEN 325 MG/1
975 TABLET ORAL EVERY 8 HOURS
Status: COMPLETED | OUTPATIENT
Start: 2023-04-20 | End: 2023-04-23

## 2023-04-20 RX ORDER — BUPIVACAINE HYDROCHLORIDE 2.5 MG/ML
INJECTION, SOLUTION INFILTRATION; PERINEURAL PRN
Status: DISCONTINUED | OUTPATIENT
Start: 2023-04-20 | End: 2023-04-20 | Stop reason: HOSPADM

## 2023-04-20 RX ORDER — BISACODYL 10 MG
10 SUPPOSITORY, RECTAL RECTAL DAILY PRN
Status: DISCONTINUED | OUTPATIENT
Start: 2023-04-20 | End: 2023-04-23 | Stop reason: HOSPADM

## 2023-04-20 RX ORDER — ONDANSETRON 2 MG/ML
INJECTION INTRAMUSCULAR; INTRAVENOUS PRN
Status: DISCONTINUED | OUTPATIENT
Start: 2023-04-20 | End: 2023-04-20

## 2023-04-20 RX ORDER — HYDROMORPHONE HYDROCHLORIDE 1 MG/ML
0.2 INJECTION, SOLUTION INTRAMUSCULAR; INTRAVENOUS; SUBCUTANEOUS EVERY 5 MIN PRN
Status: DISCONTINUED | OUTPATIENT
Start: 2023-04-20 | End: 2023-04-21

## 2023-04-20 RX ORDER — DEXAMETHASONE SODIUM PHOSPHATE 4 MG/ML
INJECTION, SOLUTION INTRA-ARTICULAR; INTRALESIONAL; INTRAMUSCULAR; INTRAVENOUS; SOFT TISSUE PRN
Status: DISCONTINUED | OUTPATIENT
Start: 2023-04-20 | End: 2023-04-20

## 2023-04-20 RX ORDER — MEPERIDINE HYDROCHLORIDE 25 MG/ML
12.5 INJECTION INTRAMUSCULAR; INTRAVENOUS; SUBCUTANEOUS EVERY 5 MIN PRN
Status: DISCONTINUED | OUTPATIENT
Start: 2023-04-20 | End: 2023-04-21

## 2023-04-20 RX ORDER — GABAPENTIN 300 MG/1
300 CAPSULE ORAL
Status: COMPLETED | OUTPATIENT
Start: 2023-04-20 | End: 2023-04-20

## 2023-04-20 RX ORDER — ONDANSETRON 4 MG/1
4 TABLET, ORALLY DISINTEGRATING ORAL EVERY 30 MIN PRN
Status: DISCONTINUED | OUTPATIENT
Start: 2023-04-20 | End: 2023-04-21

## 2023-04-20 RX ORDER — ACETAMINOPHEN 325 MG/1
975 TABLET ORAL ONCE
Status: COMPLETED | OUTPATIENT
Start: 2023-04-20 | End: 2023-04-20

## 2023-04-20 RX ORDER — HYDROMORPHONE HCL IN WATER/PF 6 MG/30 ML
0.4 PATIENT CONTROLLED ANALGESIA SYRINGE INTRAVENOUS
Status: DISCONTINUED | OUTPATIENT
Start: 2023-04-20 | End: 2023-04-23 | Stop reason: HOSPADM

## 2023-04-20 RX ORDER — SODIUM CHLORIDE, SODIUM LACTATE, POTASSIUM CHLORIDE, CALCIUM CHLORIDE 600; 310; 30; 20 MG/100ML; MG/100ML; MG/100ML; MG/100ML
INJECTION, SOLUTION INTRAVENOUS CONTINUOUS
Status: DISCONTINUED | OUTPATIENT
Start: 2023-04-20 | End: 2023-04-20 | Stop reason: HOSPADM

## 2023-04-20 RX ORDER — OXYCODONE HYDROCHLORIDE 10 MG/1
10 TABLET ORAL EVERY 4 HOURS PRN
Status: DISCONTINUED | OUTPATIENT
Start: 2023-04-20 | End: 2023-04-21

## 2023-04-20 RX ORDER — FENTANYL CITRATE 50 UG/ML
INJECTION, SOLUTION INTRAMUSCULAR; INTRAVENOUS PRN
Status: DISCONTINUED | OUTPATIENT
Start: 2023-04-20 | End: 2023-04-20

## 2023-04-20 RX ORDER — ENOXAPARIN SODIUM 100 MG/ML
40 INJECTION SUBCUTANEOUS EVERY 24 HOURS
Status: DISCONTINUED | OUTPATIENT
Start: 2023-04-21 | End: 2023-04-23 | Stop reason: HOSPADM

## 2023-04-20 RX ORDER — FAMOTIDINE 20 MG/1
20 TABLET, FILM COATED ORAL 2 TIMES DAILY
Status: DISCONTINUED | OUTPATIENT
Start: 2023-04-20 | End: 2023-04-23 | Stop reason: HOSPADM

## 2023-04-20 RX ORDER — HYDROMORPHONE HYDROCHLORIDE 1 MG/ML
0.4 INJECTION, SOLUTION INTRAMUSCULAR; INTRAVENOUS; SUBCUTANEOUS EVERY 5 MIN PRN
Status: DISCONTINUED | OUTPATIENT
Start: 2023-04-20 | End: 2023-04-21

## 2023-04-20 RX ORDER — METHOCARBAMOL 500 MG/1
750 TABLET ORAL EVERY 6 HOURS PRN
Status: DISCONTINUED | OUTPATIENT
Start: 2023-04-20 | End: 2023-04-23 | Stop reason: HOSPADM

## 2023-04-20 RX ORDER — HYDROMORPHONE HCL IN WATER/PF 6 MG/30 ML
0.2 PATIENT CONTROLLED ANALGESIA SYRINGE INTRAVENOUS
Status: DISCONTINUED | OUTPATIENT
Start: 2023-04-20 | End: 2023-04-23 | Stop reason: HOSPADM

## 2023-04-20 RX ORDER — LIDOCAINE 40 MG/G
CREAM TOPICAL
Status: DISCONTINUED | OUTPATIENT
Start: 2023-04-20 | End: 2023-04-23 | Stop reason: HOSPADM

## 2023-04-20 RX ORDER — PROCHLORPERAZINE MALEATE 5 MG
10 TABLET ORAL EVERY 6 HOURS PRN
Status: DISCONTINUED | OUTPATIENT
Start: 2023-04-20 | End: 2023-04-23 | Stop reason: HOSPADM

## 2023-04-20 RX ORDER — ONDANSETRON 4 MG/1
4 TABLET, ORALLY DISINTEGRATING ORAL EVERY 6 HOURS PRN
Status: DISCONTINUED | OUTPATIENT
Start: 2023-04-20 | End: 2023-04-23 | Stop reason: HOSPADM

## 2023-04-20 RX ORDER — POLYETHYLENE GLYCOL 3350 17 G/17G
17 POWDER, FOR SOLUTION ORAL DAILY
Status: DISCONTINUED | OUTPATIENT
Start: 2023-04-21 | End: 2023-04-22

## 2023-04-20 RX ORDER — AMOXICILLIN 250 MG
1 CAPSULE ORAL 2 TIMES DAILY
Status: DISCONTINUED | OUTPATIENT
Start: 2023-04-20 | End: 2023-04-22

## 2023-04-20 RX ORDER — ONDANSETRON 2 MG/ML
4 INJECTION INTRAMUSCULAR; INTRAVENOUS EVERY 6 HOURS PRN
Status: DISCONTINUED | OUTPATIENT
Start: 2023-04-20 | End: 2023-04-23 | Stop reason: HOSPADM

## 2023-04-20 RX ORDER — SODIUM CHLORIDE, SODIUM LACTATE, POTASSIUM CHLORIDE, CALCIUM CHLORIDE 600; 310; 30; 20 MG/100ML; MG/100ML; MG/100ML; MG/100ML
INJECTION, SOLUTION INTRAVENOUS CONTINUOUS PRN
Status: DISCONTINUED | OUTPATIENT
Start: 2023-04-20 | End: 2023-04-20

## 2023-04-20 RX ORDER — SODIUM CHLORIDE, SODIUM LACTATE, POTASSIUM CHLORIDE, CALCIUM CHLORIDE 600; 310; 30; 20 MG/100ML; MG/100ML; MG/100ML; MG/100ML
INJECTION, SOLUTION INTRAVENOUS CONTINUOUS
Status: DISCONTINUED | OUTPATIENT
Start: 2023-04-20 | End: 2023-04-20

## 2023-04-20 RX ORDER — CHLORHEXIDINE GLUCONATE ORAL RINSE 1.2 MG/ML
15 SOLUTION DENTAL ONCE
Status: COMPLETED | OUTPATIENT
Start: 2023-04-20 | End: 2023-04-20

## 2023-04-20 RX ORDER — SODIUM CHLORIDE, SODIUM GLUCONATE, SODIUM ACETATE, POTASSIUM CHLORIDE AND MAGNESIUM CHLORIDE 526; 502; 368; 37; 30 MG/100ML; MG/100ML; MG/100ML; MG/100ML; MG/100ML
INJECTION, SOLUTION INTRAVENOUS CONTINUOUS PRN
Status: DISCONTINUED | OUTPATIENT
Start: 2023-04-20 | End: 2023-04-20

## 2023-04-20 RX ORDER — DEXMEDETOMIDINE HYDROCHLORIDE 4 UG/ML
INJECTION, SOLUTION INTRAVENOUS PRN
Status: DISCONTINUED | OUTPATIENT
Start: 2023-04-20 | End: 2023-04-20

## 2023-04-20 RX ORDER — FENTANYL CITRATE 50 UG/ML
50 INJECTION, SOLUTION INTRAMUSCULAR; INTRAVENOUS EVERY 5 MIN PRN
Status: DISCONTINUED | OUTPATIENT
Start: 2023-04-20 | End: 2023-04-21

## 2023-04-20 RX ADMIN — Medication 70 MG: at 14:28

## 2023-04-20 RX ADMIN — Medication 20 MCG: at 15:15

## 2023-04-20 RX ADMIN — LIDOCAINE HYDROCHLORIDE 100 MG: 20 INJECTION, SOLUTION INFILTRATION; PERINEURAL at 14:28

## 2023-04-20 RX ADMIN — Medication 10 MG: at 16:52

## 2023-04-20 RX ADMIN — SENNOSIDES AND DOCUSATE SODIUM 1 TABLET: 8.6; 5 TABLET ORAL at 22:26

## 2023-04-20 RX ADMIN — ACETAMINOPHEN 975 MG: 325 TABLET ORAL at 12:40

## 2023-04-20 RX ADMIN — FAMOTIDINE 20 MG: 10 INJECTION, SOLUTION INTRAVENOUS at 20:20

## 2023-04-20 RX ADMIN — SODIUM CHLORIDE, SODIUM GLUCONATE, SODIUM ACETATE, POTASSIUM CHLORIDE AND MAGNESIUM CHLORIDE: 526; 502; 368; 37; 30 INJECTION, SOLUTION INTRAVENOUS at 15:11

## 2023-04-20 RX ADMIN — SODIUM CHLORIDE, SODIUM GLUCONATE, SODIUM ACETATE, POTASSIUM CHLORIDE AND MAGNESIUM CHLORIDE: 526; 502; 368; 37; 30 INJECTION, SOLUTION INTRAVENOUS at 14:36

## 2023-04-20 RX ADMIN — FENTANYL CITRATE 100 MCG: 50 INJECTION, SOLUTION INTRAMUSCULAR; INTRAVENOUS at 15:48

## 2023-04-20 RX ADMIN — PHENYLEPHRINE HYDROCHLORIDE 100 MCG: 10 INJECTION INTRAVENOUS at 16:57

## 2023-04-20 RX ADMIN — PHENYLEPHRINE HYDROCHLORIDE 100 MCG: 10 INJECTION INTRAVENOUS at 14:53

## 2023-04-20 RX ADMIN — Medication 20 MCG: at 14:35

## 2023-04-20 RX ADMIN — PHENYLEPHRINE HYDROCHLORIDE 100 MCG: 10 INJECTION INTRAVENOUS at 16:01

## 2023-04-20 RX ADMIN — FENTANYL CITRATE 100 MCG: 50 INJECTION, SOLUTION INTRAMUSCULAR; INTRAVENOUS at 14:52

## 2023-04-20 RX ADMIN — PHENYLEPHRINE HYDROCHLORIDE 100 MCG: 10 INJECTION INTRAVENOUS at 14:30

## 2023-04-20 RX ADMIN — ACETAMINOPHEN 975 MG: 325 TABLET, FILM COATED ORAL at 22:26

## 2023-04-20 RX ADMIN — CHLORHEXIDINE GLUCONATE 15 ML: 1.2 SOLUTION ORAL at 12:41

## 2023-04-20 RX ADMIN — PROPOFOL 200 MG: 10 INJECTION, EMULSION INTRAVENOUS at 14:28

## 2023-04-20 RX ADMIN — DEXMEDETOMIDINE HYDROCHLORIDE 0.5 MCG/KG/HR: 100 INJECTION, SOLUTION INTRAVENOUS at 15:36

## 2023-04-20 RX ADMIN — FENTANYL CITRATE 50 MCG: 50 INJECTION, SOLUTION INTRAMUSCULAR; INTRAVENOUS at 17:00

## 2023-04-20 RX ADMIN — Medication 10 MG: at 15:51

## 2023-04-20 RX ADMIN — FENTANYL CITRATE 100 MCG: 50 INJECTION, SOLUTION INTRAMUSCULAR; INTRAVENOUS at 14:28

## 2023-04-20 RX ADMIN — PHENYLEPHRINE HYDROCHLORIDE 100 MCG: 10 INJECTION INTRAVENOUS at 14:57

## 2023-04-20 RX ADMIN — PHENYLEPHRINE HYDROCHLORIDE 200 MCG: 10 INJECTION INTRAVENOUS at 16:24

## 2023-04-20 RX ADMIN — FENTANYL CITRATE 150 MCG: 50 INJECTION, SOLUTION INTRAMUSCULAR; INTRAVENOUS at 15:26

## 2023-04-20 RX ADMIN — FENTANYL CITRATE 400 MCG: 50 INJECTION, SOLUTION INTRAMUSCULAR; INTRAVENOUS at 15:57

## 2023-04-20 RX ADMIN — HYDROMORPHONE HYDROCHLORIDE 0.4 MG: 1 INJECTION, SOLUTION INTRAMUSCULAR; INTRAVENOUS; SUBCUTANEOUS at 17:30

## 2023-04-20 RX ADMIN — ENOXAPARIN SODIUM 40 MG: 40 INJECTION SUBCUTANEOUS at 12:40

## 2023-04-20 RX ADMIN — FENTANYL CITRATE 100 MCG: 50 INJECTION, SOLUTION INTRAMUSCULAR; INTRAVENOUS at 15:16

## 2023-04-20 RX ADMIN — PHENYLEPHRINE HYDROCHLORIDE 100 MCG: 10 INJECTION INTRAVENOUS at 15:43

## 2023-04-20 RX ADMIN — PHENYLEPHRINE HYDROCHLORIDE 100 MCG: 10 INJECTION INTRAVENOUS at 14:50

## 2023-04-20 RX ADMIN — HYDROMORPHONE HYDROCHLORIDE 0.4 MG: 0.2 INJECTION, SOLUTION INTRAMUSCULAR; INTRAVENOUS; SUBCUTANEOUS at 21:31

## 2023-04-20 RX ADMIN — SODIUM CHLORIDE: 9 INJECTION, SOLUTION INTRAVENOUS at 19:08

## 2023-04-20 RX ADMIN — HYDROMORPHONE HYDROCHLORIDE 0.5 MG: 1 INJECTION, SOLUTION INTRAMUSCULAR; INTRAVENOUS; SUBCUTANEOUS at 17:03

## 2023-04-20 RX ADMIN — PHENYLEPHRINE HYDROCHLORIDE 100 MCG: 10 INJECTION INTRAVENOUS at 15:08

## 2023-04-20 RX ADMIN — Medication 2 G: at 14:40

## 2023-04-20 RX ADMIN — MIDAZOLAM 2 MG: 1 INJECTION INTRAMUSCULAR; INTRAVENOUS at 14:18

## 2023-04-20 RX ADMIN — DEXAMETHASONE SODIUM PHOSPHATE 10 MG: 4 INJECTION, SOLUTION INTRA-ARTICULAR; INTRALESIONAL; INTRAMUSCULAR; INTRAVENOUS; SOFT TISSUE at 14:35

## 2023-04-20 RX ADMIN — Medication 750 MG: at 21:04

## 2023-04-20 RX ADMIN — Medication 20 MG: at 16:14

## 2023-04-20 RX ADMIN — SUGAMMADEX 200 MG: 100 INJECTION, SOLUTION INTRAVENOUS at 18:09

## 2023-04-20 RX ADMIN — Medication 20 MG: at 15:07

## 2023-04-20 RX ADMIN — PHENYLEPHRINE HYDROCHLORIDE 100 MCG: 10 INJECTION INTRAVENOUS at 16:45

## 2023-04-20 RX ADMIN — FENTANYL CITRATE 50 MCG: 50 INJECTION, SOLUTION INTRAMUSCULAR; INTRAVENOUS at 14:35

## 2023-04-20 RX ADMIN — ONDANSETRON 4 MG: 2 INJECTION INTRAMUSCULAR; INTRAVENOUS at 17:04

## 2023-04-20 RX ADMIN — FENTANYL CITRATE 50 MCG: 50 INJECTION, SOLUTION INTRAMUSCULAR; INTRAVENOUS at 18:36

## 2023-04-20 RX ADMIN — SODIUM CHLORIDE, POTASSIUM CHLORIDE, SODIUM LACTATE AND CALCIUM CHLORIDE: 600; 310; 30; 20 INJECTION, SOLUTION INTRAVENOUS at 14:18

## 2023-04-20 RX ADMIN — PHENYLEPHRINE HYDROCHLORIDE 0.2 MCG/KG/MIN: 10 INJECTION INTRAVENOUS at 15:42

## 2023-04-20 RX ADMIN — CELECOXIB 200 MG: 200 CAPSULE ORAL at 12:40

## 2023-04-20 RX ADMIN — GABAPENTIN 300 MG: 300 CAPSULE ORAL at 12:40

## 2023-04-20 RX ADMIN — PHENYLEPHRINE HYDROCHLORIDE 100 MCG: 10 INJECTION INTRAVENOUS at 15:03

## 2023-04-20 RX ADMIN — PHENYLEPHRINE HYDROCHLORIDE 100 MCG: 10 INJECTION INTRAVENOUS at 14:45

## 2023-04-20 ASSESSMENT — ACTIVITIES OF DAILY LIVING (ADL)
ADLS_ACUITY_SCORE: 20

## 2023-04-20 ASSESSMENT — ENCOUNTER SYMPTOMS
DYSRHYTHMIAS: 0
SEIZURES: 0

## 2023-04-20 ASSESSMENT — LIFESTYLE VARIABLES: TOBACCO_USE: 1

## 2023-04-20 NOTE — ANESTHESIA PROCEDURE NOTES
Airway       Patient location during procedure: OR       Procedure Start/Stop Times: 4/20/2023 2:32 PM  Staff -        CRNA: Jazzmine Adair APRN CRNA       Performed By: CRNA  Consent for Airway        Urgency: elective  Indications and Patient Condition       Indications for airway management: hitesh-procedural       Induction type:intravenous       Mask difficulty assessment: 1 - vent by mask    Final Airway Details       Final airway type: endotracheal airway       Successful airway: ETT - single  Endotracheal Airway Details        ETT size (mm): 8.0       Cuffed: yes       Successful intubation technique: direct laryngoscopy       DL Blade Type: MAC 4       Grade View of Cords: 1       Adjucts: stylet       Position: Right       Measured from: gums/teeth       Secured at (cm): 23       Bite block used: None    Post intubation assessment        Placement verified by: capnometry        Number of attempts at approach: 1       Number of other approaches attempted: 0       Secured with: pink tape       Ease of procedure: easy       Dentition: Intact and Unchanged    Medication(s) Administered   Medication Administration Time: 4/20/2023 2:32 PM

## 2023-04-20 NOTE — ANESTHESIA CARE TRANSFER NOTE
Patient: Guero Collins    Procedure: Procedure(s):  Median Sternotomy and thymectomy       Diagnosis: Mediastinal mass [J98.59]  Diagnosis Additional Information: No value filed.    Anesthesia Type:   General     Note:    Oropharynx: oropharynx clear of all foreign objects  Level of Consciousness: drowsy      Independent Airway: airway patency satisfactory and stable  Dentition: dentition unchanged      Patient transferred to: PACU    Handoff Report: Identifed the Patient, Identified the Reponsible Provider, Reviewed the pertinent medical history, Discussed the surgical course, Reviewed Intra-OP anesthesia mangement and issues during anesthesia, Set expectations for post-procedure period and Allowed opportunity for questions and acknowledgement of understanding      Vitals:  Vitals Value Taken Time   /58 04/20/23 1830   Temp     Pulse 67 04/20/23 1832   Resp 3 04/20/23 1832   SpO2 96 % 04/20/23 1832   Vitals shown include unvalidated device data.    Electronically Signed By: MEENU Centeno CRNA  April 20, 2023  6:33 PM

## 2023-04-20 NOTE — ANESTHESIA PROCEDURE NOTES
Arterial Line Procedure Note    Pre-Procedure   Staff -        Anesthesiologist:  Rajeev Hines MD       Performed By: anesthesiologist       Location: OR       Pre-Anesthestic Checklist: patient identified, IV checked, risks and benefits discussed, informed consent, monitors and equipment checked, pre-op evaluation and at physician/surgeon's request  Timeout:       Correct Patient: Yes        Correct Procedure: Yes        Correct Site: Yes        Correct Position: Yes   Line Placement:   This line was placed Post Induction starting at 4/20/2023 2:39 PM and ending at 4/20/2023 2:44 PM  Procedure   Procedure: arterial line       Diagnosis: Anterior mediastiinal mass       Laterality: right       Insertion Site: radial.  Sterile Prep        Standard elements of sterile barrier followed       Skin prep: Chloraprep  Insertion/Injection        Technique: Seldinger Technique and Audie's test completed        Catheter Type/Size: 20 G, 1.75 in/4.5 cm quick cath (integral wire)  Narrative        Tegaderm dressing used.       Complications: None apparent,        Arterial waveform: Yes        IBP within 10% of NIBP: Yes

## 2023-04-20 NOTE — BRIEF OP NOTE
[facility]    Brief Operative Note    Pre-operative diagnosis: Mediastinal mass [J98.59]  Post-operative diagnosis Same as pre-operative diagnosis    Procedure: Procedure(s):  Median Sternotomy and thymectomy  Surgeon: Surgeon(s) and Role:     * Alexander Campoverde MD - Primary     * Antony Eisenberg PA-C - Assisting     * Andreas Beth MD - Resident - Assisting  Anesthesia: General   Estimated Blood Loss:  50 ml    Drains:  left and right oneil pleural drains   Specimens:   ID Type Source Tests Collected by Time Destination   1 : Anterior medistinal mass Tissue Other SURGICAL PATHOLOGY EXAM Alexander Campoverde MD 4/20/2023  4:44 PM      Findings:   Extensive adhesion in the right chest. Unable to proceed with robotic procedure. Converted to sternotomy  Complications: None.  Implants: * No implants in log *

## 2023-04-20 NOTE — ANESTHESIA PREPROCEDURE EVALUATION
Pre-Operative H & P     CC:  Preoperative exam to assess for increased cardiopulmonary risk while undergoing surgery and anesthesia.    Date of Encounter: 4/19/2023  Primary Care Physician:  No Ref-Primary, Physician     Reason for visit:   No diagnosis found.    YOBANY Collins is a 27 year old male who presents for pre-operative H & P in preparation for  Procedure Information     Case: [id number] Date/Time: 04/20/23 1310    Procedure: Robot-assisted bilateral thoracoscopic anterior mediastinal mass resection (Bilateral: Chest)    Anesthesia type: General    Diagnosis: Mediastinal mass [J98.59]    Pre-op diagnosis: Mediastinal mass [J98.59]    Location:  [facility]    Providers: Alexander Campoverde MD        History is obtained from the patient and chart review    Patient who has been recently followed by medical oncology and Dr. Campoverde. He was found to have a mediastinal mass and is s/p right anterior thoracotomy, biopsy mediastinal mass on 2/17/2023. Per notes pathology revealed atypical aggregates of T-lineage cells in a background of extensive fibrosis and necrosis. This remains concerning for malignancy and further work up continues. He has been counseled for above surgical procedure by Dr. Campoverde     Patient's history is otherwise significant for obesity, previous smoking, snoring, history of COVID, and anxiety.      Hx of abnormal bleeding or anti-platelet use: Denies      Past Medical History  Past Medical History:   Diagnosis Date     Anxiety      History of COVID-19      Mediastinal mass      Obesity      Pneumonia of right lower lobe due to infectious organism        Past Surgical History  Past Surgical History:   Procedure Laterality Date     BIOPSY  01/09/2023    lung     THORACOTOMY ANTERIOR Right 2/17/2023    Procedure: Right anterior thoracotomy, biopsy mediastinal mass;  Surgeon: Alexander Campoverde MD;  Location: [facility]     wisdom teeth extraction         Prior to Admission Medications  No  current outpatient medications on file.       Allergies  No Known Allergies    Social History  Social History     Socioeconomic History     Marital status: Single     Spouse name: Not on file     Number of children: Not on file     Years of education: Not on file     Highest education level: Not on file   Occupational History     Not on file   Tobacco Use     Smoking status: Former     Types: Cigarettes, Vaping Device     Quit date: 2021     Years since quittin.3     Passive exposure: Past     Smokeless tobacco: Never     Tobacco comments:     Quit marijuana about 6 mo ago. 23 PO   Vaping Use     Vaping status: Not on file   Substance and Sexual Activity     Alcohol use: Not Currently     Drug use: Not Currently     Types: Marijuana     Comment: None in the last 6 months     Sexual activity: Not on file   Other Topics Concern     Not on file   Social History Narrative     Not on file     Social Determinants of Health     Financial Resource Strain: Not on file   Food Insecurity: Not on file   Transportation Needs: Not on file   Physical Activity: Not on file   Stress: Not on file   Social Connections: Not on file   Intimate Partner Violence: Not on file   Housing Stability: Not on file       Family History  Family History   Problem Relation Age of Onset     Anesthesia Reaction No family hx of      Deep Vein Thrombosis (DVT) No family hx of        Review of Systems  The complete review of systems is negative other than noted in the HPI or here.   Anesthesia Evaluation   Pt has had prior anesthetic. Type: General.    No history of anesthetic complications       ROS/MED HX  ENT/Pulmonary: Comment: Mediastinal mass    (+) SAMUEL risk factors, snores loudly, tobacco use, Past use,  (-) sleep apnea and recent URI   Neurologic:  - neg neurologic ROS  (-) no seizures and no CVA   Cardiovascular:     (+) -----Previous cardiac testing   Echo: Date: 23 Results:    Stress Test: Date: Results:    ECG Reviewed: Date:  1/6/23 Results:  Sinus rhythm with sinus arrhythmia, incomplete RBBB  Cath: Date: Results:   (-) taking anticoagulants/antiplatelets, NOEL, arrhythmias and murmur   METS/Exercise Tolerance: >4 METS    Hematologic:  - neg hematologic  ROS     Musculoskeletal:  - neg musculoskeletal ROS     GI/Hepatic:  - neg GI/hepatic ROS  (-) GERD   Renal/Genitourinary:  - neg Renal ROS     Endo:     (+) Obesity,     Psychiatric/Substance Use:     (+) psychiatric history anxiety     Infectious Disease:     (+) MRSA,     Malignancy:  - neg malignancy ROS     Other:  - neg other ROS          Virtual visit -  No vitals were obtained    Physical Exam  Constitutional: Awake, alert, no apparent distress, and appears stated age.  HENT: Normocephalic  Respiratory: non labored breathing; no cough   Neurologic: Oriented to name, place and time.   Neuropsychiatric: Calm, cooperative. Normal affect.   SKIN: Patient showed me virtually an area on his left upper thigh with 2 red linear marks, dry, non-irritated, does not appear raised, which he thinks might be a rash versus stretch marks. Denies itching. Encouraged him to monitor for changes.     Prior Labs/Diagnostic Studies   All labs and imaging personally reviewed   Lab Results   Component Value Date    WBC 6.3 03/15/2023     Lab Results   Component Value Date    RBC 5.04 03/15/2023     Lab Results   Component Value Date    HGB 14.6 03/15/2023     Lab Results   Component Value Date    HCT 44.5 03/15/2023     Lab Results   Component Value Date    MCV 88 03/15/2023     Lab Results   Component Value Date    MCH 29.0 03/15/2023     Lab Results   Component Value Date    MCHC 32.8 03/15/2023     Lab Results   Component Value Date    RDW 13.2 03/15/2023     Lab Results   Component Value Date     03/15/2023     Last Comprehensive Metabolic Panel:  Sodium   Date Value Ref Range Status   03/10/2023 138 136 - 145 mmol/L Final     Potassium   Date Value Ref Range Status   03/10/2023 4.1 3.4 - 5.3  mmol/L Final   2023 4.2 3.5 - 5.0 mmol/L Final     Chloride   Date Value Ref Range Status   03/10/2023 103 98 - 107 mmol/L Final   2023 104 98 - 107 mmol/L Final     Carbon Dioxide (CO2)   Date Value Ref Range Status   03/10/2023 25 22 - 29 mmol/L Final   2023 26 22 - 31 mmol/L Final     Anion Gap   Date Value Ref Range Status   03/10/2023 10 7 - 15 mmol/L Final   2023 9 5 - 18 mmol/L Final     Glucose   Date Value Ref Range Status   03/10/2023 104 (H) 70 - 99 mg/dL Final   03/10/2023 93 70 - 99 mg/dL Final     Urea Nitrogen   Date Value Ref Range Status   03/10/2023 13.2 6.0 - 20.0 mg/dL Final   2023 12 8 - 22 mg/dL Final     Creatinine   Date Value Ref Range Status   03/10/2023 0.73 0.67 - 1.17 mg/dL Final     GFR Estimate   Date Value Ref Range Status   03/10/2023 >90 >60 mL/min/1.73m2 Final     Comment:     eGFR calculated using  CKD-EPI equation.     Calcium   Date Value Ref Range Status   03/10/2023 9.7 8.6 - 10.0 mg/dL Final     Bilirubin Total   Date Value Ref Range Status   03/10/2023 0.2 <=1.2 mg/dL Final     Alkaline Phosphatase   Date Value Ref Range Status   03/10/2023 67 40 - 129 U/L Final     ALT   Date Value Ref Range Status   03/10/2023 18 10 - 50 U/L Final     AST   Date Value Ref Range Status   03/10/2023 13 10 - 50 U/L Final   3/10/23   INR 0.96  3/15/23  PTT 29  23  Ferritin 31    EK23 Sinus rhythm with sinus arrrhythmia, incomplete RBBB    Echocardiogram 23  Interpretation Summary  Global and regional left ventricular function is normal with an EF of 60-65%.  Global right ventricular function is normal. The right ventricle is normal  size.  No significant valvular abnormalities.  No pericardial effusion is present.  There is no prior study for direct comparison.    CT Soft tissue 3/10/23                                                        Impression:      1. No evidence of hypermetabolic cervical adenopathy. Relatively  symmetric mild uptake  in prominent bilateral cervical level II nodes  is favored reactive.     2. Please refer to the whole body PET CT performed as a separate  report, for the findings of the remainder of the body.    PET Oncology 3/10/23                                             IMPRESSION:      1. Interval reduction in size of mixed cystic and solid anterior  mediastinal mass, measuring up to 6.2 cm, previously 10.5 cm. The mass  demonstrates scattered areas of hypermetabolic FDG uptake  predominantly centered within the solid components, with intensity  markedly above the liver (SUV max 6.08). No other definite sites of  disease identified.     2. Mild diffusely increased uptake in the axial bone marrow in a  heterogenous pattern without CT correlate is indeterminate, probably  reactive. Attention on follow-up/correlation with bone marrow biopsy.     3. Please refer to same-day high resolution neuroradiology PET/CT for  findings regarding the head and neck.        Latest Ref Rng & Units 1/12/2023     1:59 PM   PFT   FVC L 2.68     FEV1 L 2.18     FVC% % 50     FEV1% % 48           The patient's records and results personally reviewed by this provider.     Outside records reviewed from: Care Everywhere      Assessment      Guero Collins is a 27 year old male seen as a PAC referral for risk assessment and optimization for anesthesia.    Plan/Recommendations  Pt will be optimized for the proposed procedure.  See below for details on the assessment, risk, and preoperative recommendations    NEUROLOGY  - No history of TIA, CVA or seizure    -Post Op delirium risk factors:  No risk identified    ENT  - No current airway concerns.  Will need to be reassessed day of surgery. Last airway notes 2/17/23  Mallampati: Unable to assess  TM: Unable to assess    CARDIAC  - No history of CAD, Hypertension and Afib.   - METS (Metabolic Equivalents)>4  Patient performs 4 or more METS exercise without symptoms      RCRI: 0.9% risk of serious cardiac  "events    PULMONARY  - Intermediate risk for Obstructive Sleep Apnea  Snores    - Anterior mediastinal mass - procedure as above  Right chest incision reported well healed. Some soreness when sneezing  Denies cough or shortness of breath    - Tobacco History      History   Smoking Status     Former     Types: Cigarettes, Vaping Device     Quit date: 12/2021   Smokeless Tobacco     Never       GI: Rare use of TUMS for heartburn   PONV Medium Risk  Total Score: 2           1 AN PONV: Patient is not a current smoker    1 AN PONV: Intended Post Op Opioids        /RENAL  - Baseline Creatinine 0.73    ENDOCRINE    - BMI: Estimated body mass index is 34.63 kg/m  (pended) as calculated from the following:    Height as of an earlier encounter on 4/20/23: (P) 1.727 m (5' 8\").    Weight as of an earlier encounter on 4/20/23: (P) 103.3 kg (227 lb 11.8 oz).  Obesity (BMI >30)  - No history of Diabetes Mellitus Last random blood glucose      HEME  VTE Low Risk 0.5%            Total Score: 2    VTE: Male      Denies history of blood clots  Denies history of blood transfusion   Last Hgb: 14.6  Last type and screen drawn on 1/23/23-antibody screen negative     MSK: Patient is not frail    PSYCH  - Anxiety. No meds    Enhanced Recovery Pathway initiated    Final plan will be decided by the assigned anesthesia provider on the date of service.        The patient is optimized for their procedure. AVS with information on surgery time/arrival time, meds and NPO status given by nursing staff. No further diagnostic testing indicated.    Please refer to the physical examination documented by the anesthesiologist in the anesthesia record on the day of surgery.    Video-Visit Details    Type of service:  Video Visit    Provider received verbal consent for a Video Visit from the patient? Yes   Video Start Time: 7:42am   Video End Time: 7:54am     Originating Location (pt. Location): Home    Distant Location (provider location):  " Off-site  Mode of Communication:  Video Conference via [employer]  On the day of service:     Prep time: 13 minutes  Visit time: 12 minutes  Documentation time: 18 minutes  ------------------------------------------  Total time: 43 minutes      MEENU Aly CNS  Preoperative Assessment Center  [facility]  Phone: [phone]  Fax: [phone]    Physical Exam    Airway        Mallampati: II   TM distance: > 3 FB   Neck ROM: full   Mouth opening: > 3 cm    Respiratory Devices and Support         Dental       (+) Completely normal teeth      Cardiovascular          Rhythm and rate: regular and normal (-) no murmur    Pulmonary           breath sounds clear to auscultation             Anesthesia Plan    ASA Status:  3   NPO Status:  NPO Appropriate    Anesthesia Type: General.     - Airway: ETT   Induction: Intravenous.   Maintenance: Inhalation.   Techniques and Equipment:     - Lines/Monitors: 2nd IV, Arterial Line, BIS     - Blood: T&C     Consents    Anesthesia Plan(s) and associated risks, benefits, and realistic alternatives discussed. Questions answered and patient/representative(s) expressed understanding.     - Discussed: Risks, Benefits and Alternatives for BOTH SEDATION and the PROCEDURE were discussed     - Discussed with:  Patient      - Extended Intubation/Ventilatory Support Discussed: No.      - Patient is DNR/DNI Status: No    Use of blood products discussed: Yes.     - Discussed with: Patient.     - Consented: consented to blood products            Reason for refusal: other.     Postoperative Care    Pain management: IV analgesics, Oral pain medications.   PONV prophylaxis: Ondansetron (or other 5HT-3), Dexamethasone or Solumedrol     Comments:    Other Comments: Patient seen and examined.  Risks, benefits and alternatives to GETA discussed.  Questions answered and patient wishes to proceed.  Patient denies symptoms with exertion or positional  changes

## 2023-04-21 ENCOUNTER — APPOINTMENT (OUTPATIENT)
Dept: GENERAL RADIOLOGY | Facility: CLINIC | Age: 28
DRG: 828 | End: 2023-04-21
Attending: THORACIC SURGERY (CARDIOTHORACIC VASCULAR SURGERY)
Payer: COMMERCIAL

## 2023-04-21 LAB
ANION GAP SERPL CALCULATED.3IONS-SCNC: 11 MMOL/L (ref 7–15)
BUN SERPL-MCNC: 11.5 MG/DL (ref 6–20)
CALCIUM SERPL-MCNC: 8.7 MG/DL (ref 8.6–10)
CHLORIDE SERPL-SCNC: 105 MMOL/L (ref 98–107)
CREAT SERPL-MCNC: 0.71 MG/DL (ref 0.67–1.17)
DEPRECATED HCO3 PLAS-SCNC: 21 MMOL/L (ref 22–29)
ERYTHROCYTE [DISTWIDTH] IN BLOOD BY AUTOMATED COUNT: 14.1 % (ref 10–15)
GFR SERPL CREATININE-BSD FRML MDRD: >90 ML/MIN/1.73M2
GLUCOSE SERPL-MCNC: 143 MG/DL (ref 70–99)
HCT VFR BLD AUTO: 42.1 % (ref 40–53)
HGB BLD-MCNC: 13.7 G/DL (ref 13.3–17.7)
MCH RBC QN AUTO: 28.7 PG (ref 26.5–33)
MCHC RBC AUTO-ENTMCNC: 32.5 G/DL (ref 31.5–36.5)
MCV RBC AUTO: 88 FL (ref 78–100)
PLATELET # BLD AUTO: 301 10E3/UL (ref 150–450)
POTASSIUM SERPL-SCNC: 4.1 MMOL/L (ref 3.4–5.3)
RBC # BLD AUTO: 4.78 10E6/UL (ref 4.4–5.9)
SODIUM SERPL-SCNC: 137 MMOL/L (ref 136–145)
WBC # BLD AUTO: 14.7 10E3/UL (ref 4–11)

## 2023-04-21 PROCEDURE — 250N000013 HC RX MED GY IP 250 OP 250 PS 637: Performed by: SURGERY

## 2023-04-21 PROCEDURE — 250N000011 HC RX IP 250 OP 636: Performed by: SURGERY

## 2023-04-21 PROCEDURE — 250N000011 HC RX IP 250 OP 636: Performed by: STUDENT IN AN ORGANIZED HEALTH CARE EDUCATION/TRAINING PROGRAM

## 2023-04-21 PROCEDURE — 82310 ASSAY OF CALCIUM: CPT | Performed by: STUDENT IN AN ORGANIZED HEALTH CARE EDUCATION/TRAINING PROGRAM

## 2023-04-21 PROCEDURE — 71045 X-RAY EXAM CHEST 1 VIEW: CPT | Mod: 26 | Performed by: RADIOLOGY

## 2023-04-21 PROCEDURE — 71045 X-RAY EXAM CHEST 1 VIEW: CPT

## 2023-04-21 PROCEDURE — 250N000013 HC RX MED GY IP 250 OP 250 PS 637

## 2023-04-21 PROCEDURE — 120N000002 HC R&B MED SURG/OB UMMC

## 2023-04-21 PROCEDURE — 250N000013 HC RX MED GY IP 250 OP 250 PS 637: Performed by: STUDENT IN AN ORGANIZED HEALTH CARE EDUCATION/TRAINING PROGRAM

## 2023-04-21 PROCEDURE — 85027 COMPLETE CBC AUTOMATED: CPT | Performed by: STUDENT IN AN ORGANIZED HEALTH CARE EDUCATION/TRAINING PROGRAM

## 2023-04-21 RX ORDER — OXYCODONE HYDROCHLORIDE 5 MG/1
5 TABLET ORAL EVERY 4 HOURS PRN
Status: DISCONTINUED | OUTPATIENT
Start: 2023-04-21 | End: 2023-04-23 | Stop reason: HOSPADM

## 2023-04-21 RX ORDER — KETOROLAC TROMETHAMINE 30 MG/ML
15 INJECTION, SOLUTION INTRAMUSCULAR; INTRAVENOUS EVERY 6 HOURS PRN
Status: DISCONTINUED | OUTPATIENT
Start: 2023-04-21 | End: 2023-04-23 | Stop reason: HOSPADM

## 2023-04-21 RX ORDER — OXYCODONE HYDROCHLORIDE 10 MG/1
10 TABLET ORAL EVERY 4 HOURS PRN
Status: DISCONTINUED | OUTPATIENT
Start: 2023-04-21 | End: 2023-04-23 | Stop reason: HOSPADM

## 2023-04-21 RX ORDER — OXYCODONE HYDROCHLORIDE 5 MG/1
5 TABLET ORAL EVERY 4 HOURS PRN
Status: DISCONTINUED | OUTPATIENT
Start: 2023-04-21 | End: 2023-04-21

## 2023-04-21 RX ADMIN — ACETAMINOPHEN 975 MG: 325 TABLET, FILM COATED ORAL at 12:36

## 2023-04-21 RX ADMIN — ACETAMINOPHEN 975 MG: 325 TABLET, FILM COATED ORAL at 21:44

## 2023-04-21 RX ADMIN — ONDANSETRON 4 MG: 2 INJECTION INTRAMUSCULAR; INTRAVENOUS at 17:07

## 2023-04-21 RX ADMIN — FAMOTIDINE 20 MG: 20 TABLET ORAL at 19:44

## 2023-04-21 RX ADMIN — KETOROLAC TROMETHAMINE 15 MG: 30 INJECTION, SOLUTION INTRAMUSCULAR at 07:41

## 2023-04-21 RX ADMIN — HYDROMORPHONE HYDROCHLORIDE 0.2 MG: 0.2 INJECTION, SOLUTION INTRAMUSCULAR; INTRAVENOUS; SUBCUTANEOUS at 14:41

## 2023-04-21 RX ADMIN — SENNOSIDES AND DOCUSATE SODIUM 1 TABLET: 8.6; 5 TABLET ORAL at 07:41

## 2023-04-21 RX ADMIN — OXYCODONE HYDROCHLORIDE 5 MG: 5 TABLET ORAL at 12:36

## 2023-04-21 RX ADMIN — Medication 750 MG: at 12:36

## 2023-04-21 RX ADMIN — ENOXAPARIN SODIUM 40 MG: 40 INJECTION SUBCUTANEOUS at 12:22

## 2023-04-21 RX ADMIN — ONDANSETRON 4 MG: 4 TABLET, ORALLY DISINTEGRATING ORAL at 07:03

## 2023-04-21 RX ADMIN — SENNOSIDES AND DOCUSATE SODIUM 1 TABLET: 8.6; 5 TABLET ORAL at 19:44

## 2023-04-21 RX ADMIN — OXYCODONE HYDROCHLORIDE 5 MG: 5 TABLET ORAL at 07:41

## 2023-04-21 RX ADMIN — ACETAMINOPHEN 975 MG: 325 TABLET, FILM COATED ORAL at 04:50

## 2023-04-21 RX ADMIN — OXYCODONE HYDROCHLORIDE 10 MG: 10 TABLET ORAL at 21:44

## 2023-04-21 RX ADMIN — FAMOTIDINE 20 MG: 20 TABLET ORAL at 07:55

## 2023-04-21 RX ADMIN — HYDROMORPHONE HYDROCHLORIDE 0.2 MG: 0.2 INJECTION, SOLUTION INTRAMUSCULAR; INTRAVENOUS; SUBCUTANEOUS at 07:09

## 2023-04-21 RX ADMIN — HYDROMORPHONE HYDROCHLORIDE 0.2 MG: 0.2 INJECTION, SOLUTION INTRAMUSCULAR; INTRAVENOUS; SUBCUTANEOUS at 07:21

## 2023-04-21 RX ADMIN — HYDROMORPHONE HYDROCHLORIDE 0.2 MG: 0.2 INJECTION, SOLUTION INTRAMUSCULAR; INTRAVENOUS; SUBCUTANEOUS at 10:22

## 2023-04-21 RX ADMIN — POLYETHYLENE GLYCOL 3350 17 G: 17 POWDER, FOR SOLUTION ORAL at 08:12

## 2023-04-21 RX ADMIN — Medication 750 MG: at 06:04

## 2023-04-21 RX ADMIN — OXYCODONE HYDROCHLORIDE 2.5 MG: 5 TABLET ORAL at 00:52

## 2023-04-21 RX ADMIN — OXYCODONE HYDROCHLORIDE 10 MG: 10 TABLET ORAL at 17:07

## 2023-04-21 ASSESSMENT — ACTIVITIES OF DAILY LIVING (ADL)
ADLS_ACUITY_SCORE: 20
ADLS_ACUITY_SCORE: 22
ADLS_ACUITY_SCORE: 20
ADLS_ACUITY_SCORE: 22
ADLS_ACUITY_SCORE: 20
ADLS_ACUITY_SCORE: 22

## 2023-04-21 NOTE — ANESTHESIA POSTPROCEDURE EVALUATION
Patient: Guero Collins    Procedure: Procedure(s):  Median Sternotomy and thymectomy       Anesthesia Type:  General    Note:  Disposition: Admission   Postop Pain Control: Uneventful            Sign Out: Well controlled pain   PONV: No   Neuro/Psych: Uneventful            Sign Out: Acceptable/Baseline neuro status   Airway/Respiratory: Uneventful            Sign Out: Acceptable/Baseline resp. status   CV/Hemodynamics: Uneventful            Sign Out: Acceptable CV status; No obvious hypovolemia; No obvious fluid overload   Other NRE:    DID A NON-ROUTINE EVENT OCCUR?            Last vitals:  Vitals Value Taken Time   /50 04/20/23 1900   Temp 36.6  C (97.9  F) 04/20/23 1836   Pulse 77 04/20/23 1929   Resp 0 04/20/23 1929   SpO2 93 % 04/20/23 1929   Vitals shown include unvalidated device data.    Electronically Signed By: Luis Swift MD  April 20, 2023  7:29 PM

## 2023-04-21 NOTE — PROGRESS NOTES
Neuro: A&Ox4.   Cardiac: SR. VSS.   Respiratory: Sating 95 on RA.  GI/: Adequate urine output.  Diet/appetite: Tolerating regular diet. Eating well.  Activity:  Assist x1, up to chair and in bathroom.  Pain: At acceptable level on currentregimen.   Skin: No new deficits noted. Chest bandage removed per order and ointment applied.  Plan: Continue with POC. Notify primary team with changes.

## 2023-04-21 NOTE — PROGRESS NOTES
"    [facility]    Progress Note - Thoracic Surgery Service       Date of Admission:  4/20/2023    Assessment & Plan: Surgery   Guero Collins is a 28yo M with a history of a mediastinal mass who is now s/p median sternotomy and thymus mass resection on 4/20. He is recovering appropriately.    -Follow up path   -CXR daily  -ADAT  -Pain control  -Pulmonary toilet  -Sternal precautions         Diet: Regular Diet Adult    DVT Prophylaxis: Enoxaparin (Lovenox) SQ  Bryant Catheter: Not present  Lines: PRESENT      Arterial Line 04/20/23 Radial-Site Assessment: WDL      Drains: PRESENT         - 2 Chest Tube(s)   Cardiac Monitoring: None  Code Status: Full Code      Clinically Significant Risk Factors Present on Admission                       # Obesity: Estimated body mass index is 34.63 kg/m  as calculated from the following:    Height as of this encounter: 1.727 m (5' 8\").    Weight as of this encounter: 103.3 kg (227 lb 11.8 oz).           Disposition Plan      Expected Discharge Date: 04/22/2023                 The patient's care was discussed with the fellow.    Andreas Beth MD  [facility]  Non-urgent messages: Securely message with [employer] (more info)  Text page via [email or web address] Paging/Directory     ______________________________________________________________________    Interval History   Patient did well overnight. Pain well controlled. No N/V.    Physical Exam   Vital Signs: Temp: 97.9  F (36.6  C) Temp src: Oral BP: (!) 155/74 Pulse: 87   Resp: 15 SpO2: 95 % O2 Device: Nasal cannula Oxygen Delivery: 2 LPM  Weight: 227 lbs 11.76 oz    Intake/Output Summary (Last 24 hours) at 4/21/2023 0828  Last data filed at 4/21/2023 0600  Gross per 24 hour   Intake 2222 ml   Output 1415 ml   Net 807 ml     General Appearance: NAD, lying in bed comfortably   Respiratory: breathing comfortably on RA  Cardiovascular: RRR  GI: soft, appropriately " tender, non distended, 2 chest tubes in place sub-xyphoid with no air leaks  Skin: incisions c/d/i with dressing over sternotomy   Other: Neuro intact           Data     I have personally reviewed the following data over the past 24 hrs:    14.7 (H)  \   13.7   / 301     137 105 11.5 /  143 (H)   4.1 21 (L) 0.71 \       Procal: N/A CRP: N/A Lactic Acid: 0.8         Imaging results reviewed over the past 24 hrs:   Recent Results (from the past 24 hour(s))   XR Chest Port 1 View    Narrative    EXAM: Chest radiograph 4/20/2023 7:34 PM    HISTORY: 27 years Male post op.     COMPARISON: Chest radiograph 3/7/2023. CT chest 1/9/2023.    TECHNIQUE: Frontal and lateral views of the chest.    FINDINGS:   Postsurgical changes of median sternotomy and thymectomy. Cerclage  wires grossly intact.. Surgical clips overlie the right hemithorax and  right upper quadrant. Chest tubes x2.    The trachea is midline. Normal cardiomediastinal silhouette. Distinct  pulmonary vasculature. Hazy patchy bibasilar opacities, likely  secondary to postoperative atelectasis versus edema. Small right  pleural effusion much decreased from 3/7/2023.. No discernible  pneumothorax. Grossly normal lung volumes. Grossly symmetric  hemidiaphragms. Unremarkable upper abdomen. Unremarkable soft tissues.      Impression    IMPRESSION:   1.  Postsurgical changes of the chest. Mediastinal sternotomy wires  are intact  2.  Bibasilar patchy additional areas opacities likely secondary to  pulmonary edema versus atelectasis in the postoperative setting.  Markedly decreased right pleural effusion.  3.  Left and right chest tubes.    I have personally reviewed the examination and initial interpretation  and I agree with the findings.    ALEJANDRA PEREZ MD         SYSTEM ID:  [id number]   XR Chest Port 1 View    Impression    RESIDENT PRELIMINARY INTERPRETATION  IMPRESSION:   1.  Trace right apical pneumothorax, decreased. No pneumothorax on the  left.  2.  Stable  perihilar interstitial opacities, atelectasis versus  pulmonary edema.

## 2023-04-21 NOTE — OR NURSING
Patient cell phone and cell phone  labeled and left with patient. ALL other belongings sent with patient family.

## 2023-04-21 NOTE — OR NURSING
Chest tube removal kit, Vaseline Gauze, Occlusive tape, and safety tube clamp placed at patient bedside.

## 2023-04-21 NOTE — PROGRESS NOTES
"Post Op Check    04/21/2023    Guero Collins is a 27 year old male with h/o thymus mass now POD#0 s/p median sternotomy and thymectomy.    Pt reports manageable post-op pain and reproducible incisional pain. Denies SOB, or dizziness. Has not gotten out of bed. Denies nausea/vomiting. Adequate UOP.    /65   Pulse 85   Temp 98.4  F (36.9  C) (Oral)   Resp 22   Ht 1.727 m (5' 8\")   Wt 103.3 kg (227 lb 11.8 oz)   SpO2 97%   BMI 34.63 kg/m      Gen: A&O x3, NAD  Chest: breathing non-labored on NC. CT to waterseal, no apparent air leak w/ cough. Dressing c/d/i     A/P: No acute post-op issues. Continue plan of care per primary team. Please call with any questions.    Please page if questions,     Moses Wong MD  Surgical Resident  #[id number]          "

## 2023-04-22 ENCOUNTER — APPOINTMENT (OUTPATIENT)
Dept: GENERAL RADIOLOGY | Facility: CLINIC | Age: 28
DRG: 828 | End: 2023-04-22
Attending: THORACIC SURGERY (CARDIOTHORACIC VASCULAR SURGERY)
Payer: COMMERCIAL

## 2023-04-22 LAB — GLUCOSE BLDC GLUCOMTR-MCNC: 99 MG/DL (ref 70–99)

## 2023-04-22 PROCEDURE — 250N000013 HC RX MED GY IP 250 OP 250 PS 637: Performed by: STUDENT IN AN ORGANIZED HEALTH CARE EDUCATION/TRAINING PROGRAM

## 2023-04-22 PROCEDURE — 71045 X-RAY EXAM CHEST 1 VIEW: CPT | Mod: 26 | Performed by: RADIOLOGY

## 2023-04-22 PROCEDURE — 71045 X-RAY EXAM CHEST 1 VIEW: CPT | Mod: 77

## 2023-04-22 PROCEDURE — 120N000002 HC R&B MED SURG/OB UMMC

## 2023-04-22 PROCEDURE — 250N000011 HC RX IP 250 OP 636: Performed by: SURGERY

## 2023-04-22 PROCEDURE — 250N000013 HC RX MED GY IP 250 OP 250 PS 637: Performed by: SURGERY

## 2023-04-22 PROCEDURE — 250N000011 HC RX IP 250 OP 636: Performed by: STUDENT IN AN ORGANIZED HEALTH CARE EDUCATION/TRAINING PROGRAM

## 2023-04-22 PROCEDURE — 71045 X-RAY EXAM CHEST 1 VIEW: CPT

## 2023-04-22 RX ORDER — NALOXONE HYDROCHLORIDE 0.4 MG/ML
0.2 INJECTION, SOLUTION INTRAMUSCULAR; INTRAVENOUS; SUBCUTANEOUS
Status: DISCONTINUED | OUTPATIENT
Start: 2023-04-22 | End: 2023-04-23 | Stop reason: HOSPADM

## 2023-04-22 RX ORDER — AMOXICILLIN 250 MG
2 CAPSULE ORAL 2 TIMES DAILY
Status: DISCONTINUED | OUTPATIENT
Start: 2023-04-22 | End: 2023-04-23 | Stop reason: HOSPADM

## 2023-04-22 RX ORDER — POLYETHYLENE GLYCOL 3350 17 G/17G
17 POWDER, FOR SOLUTION ORAL 2 TIMES DAILY
Status: DISCONTINUED | OUTPATIENT
Start: 2023-04-22 | End: 2023-04-23 | Stop reason: HOSPADM

## 2023-04-22 RX ORDER — NALOXONE HYDROCHLORIDE 0.4 MG/ML
0.4 INJECTION, SOLUTION INTRAMUSCULAR; INTRAVENOUS; SUBCUTANEOUS
Status: DISCONTINUED | OUTPATIENT
Start: 2023-04-22 | End: 2023-04-23 | Stop reason: HOSPADM

## 2023-04-22 RX ADMIN — OXYCODONE HYDROCHLORIDE 10 MG: 10 TABLET ORAL at 11:53

## 2023-04-22 RX ADMIN — Medication 750 MG: at 20:06

## 2023-04-22 RX ADMIN — HYDROMORPHONE HYDROCHLORIDE 0.4 MG: 0.2 INJECTION, SOLUTION INTRAMUSCULAR; INTRAVENOUS; SUBCUTANEOUS at 15:41

## 2023-04-22 RX ADMIN — FAMOTIDINE 20 MG: 20 TABLET ORAL at 08:00

## 2023-04-22 RX ADMIN — POLYETHYLENE GLYCOL 3350 17 G: 17 POWDER, FOR SOLUTION ORAL at 20:05

## 2023-04-22 RX ADMIN — ACETAMINOPHEN 975 MG: 325 TABLET, FILM COATED ORAL at 12:33

## 2023-04-22 RX ADMIN — ENOXAPARIN SODIUM 40 MG: 40 INJECTION SUBCUTANEOUS at 11:32

## 2023-04-22 RX ADMIN — ONDANSETRON 4 MG: 4 TABLET, ORALLY DISINTEGRATING ORAL at 06:57

## 2023-04-22 RX ADMIN — ACETAMINOPHEN 975 MG: 325 TABLET, FILM COATED ORAL at 04:32

## 2023-04-22 RX ADMIN — Medication 750 MG: at 11:41

## 2023-04-22 RX ADMIN — KETOROLAC TROMETHAMINE 15 MG: 30 INJECTION, SOLUTION INTRAMUSCULAR at 00:46

## 2023-04-22 RX ADMIN — KETOROLAC TROMETHAMINE 15 MG: 30 INJECTION, SOLUTION INTRAMUSCULAR at 10:36

## 2023-04-22 RX ADMIN — Medication 750 MG: at 00:45

## 2023-04-22 RX ADMIN — SENNOSIDES AND DOCUSATE SODIUM 2 TABLET: 50; 8.6 TABLET ORAL at 20:06

## 2023-04-22 RX ADMIN — ONDANSETRON 4 MG: 4 TABLET, ORALLY DISINTEGRATING ORAL at 17:24

## 2023-04-22 RX ADMIN — SENNOSIDES AND DOCUSATE SODIUM 2 TABLET: 50; 8.6 TABLET ORAL at 08:00

## 2023-04-22 RX ADMIN — FAMOTIDINE 20 MG: 20 TABLET ORAL at 20:06

## 2023-04-22 RX ADMIN — POLYETHYLENE GLYCOL 3350 17 G: 17 POWDER, FOR SOLUTION ORAL at 07:59

## 2023-04-22 RX ADMIN — ACETAMINOPHEN 975 MG: 325 TABLET, FILM COATED ORAL at 21:40

## 2023-04-22 RX ADMIN — OXYCODONE HYDROCHLORIDE 10 MG: 10 TABLET ORAL at 21:43

## 2023-04-22 RX ADMIN — OXYCODONE HYDROCHLORIDE 10 MG: 10 TABLET ORAL at 08:00

## 2023-04-22 RX ADMIN — OXYCODONE HYDROCHLORIDE 10 MG: 10 TABLET ORAL at 17:24

## 2023-04-22 ASSESSMENT — ACTIVITIES OF DAILY LIVING (ADL)
ADLS_ACUITY_SCORE: 22

## 2023-04-22 NOTE — PROGRESS NOTES
[id number]    A&Ox4, able to verbalize needs. Not on tele. Complains of pain, controlled by meds. Left chest tube pulled at 1645, patient tolerated. Has R and L PIV saline locked.

## 2023-04-22 NOTE — PLAN OF CARE
NURSING PROGRESS NOTE  Shift Summary      Date: April 22, 2023     Neuro/Musculoskeletal:  A&Ox4.  Cardiac:  HR.  VSS, afebrile.   Respiratory:  Sating in the 90s on RA.  GI/:  Adequate urine output.  LBM: 4/20  Diet/Appetite:  Tolerating regular diet.  Activity:  Assist of 1.  Pain:  Pain controlled with Oxycodone, scheduled Tylenol and Robaxin.  Skin:  No new deficits noted.  LDAs + Drips/IVF:  R & L PIV SL  Protocols/Labs:  Routine    Pertinent Shift Updates: Routine      Plan:  Pain control with the rest of POC.      Carlie Foreman RN  .................................................... April 22, 2023   [facility] ([facility]): [facility] ICU (Unit 6D)    Goal Outcome Evaluation:    Plan of Care Reviewed With: patient  Overall Patient Progress: improvingOverall Patient Progress: improving  Outcome Evaluation: Patient A/O X 4, pain control is still ongoing, chest tube output is moderate. Incision is FERN, clean dry and intact with no signs of infection.

## 2023-04-22 NOTE — PROGRESS NOTES
THORACIC & FOREGUT SURGERY    Seen and examined.  Doing well.    Vitals:    04/22/23 0431 04/22/23 0432 04/22/23 0900 04/22/23 1237   BP: 111/55  119/67 127/67   BP Location: Right arm  Right arm Left arm   Patient Position: Semi-Pereira's      Cuff Size: Adult Regular      Pulse: 67  87 80   Resp: 16  16 16   Temp: 97.6  F (36.4  C) 97.6  F (36.4  C) 97.7  F (36.5  C) 99.5  F (37.5  C)   TempSrc: Oral  Oral Axillary   SpO2: 96%  94% 92%   Weight:       Height:            Most recent labs and Imaging Reviewed       A/P: 27 year old male POD#2 status post median sternotomy and thymus mass resection on 4/20.   - Remove left tube today  - Ambulate  - Incentive spirometry  - Pain control  - Sternal precautions    Alexander Campoverde MD

## 2023-04-22 NOTE — PROGRESS NOTES
Cross-cover note: 8:53 PM 4/21/2023 04/21/2023    General Surgery Cross Cover Note    Called by nursing regarding: Pt hearing gurgling nose and hearbeat w/ prolonged exhale     Per patient: Able to hear his heartbeat and a gurgling sound when he exhales fully. Feels uneasy about it. Otherwise denies any new CP, SOB, dizziness, or confusion.     B/P: 136/68, T: 98.3, P: 84, R: 16    PE:  A&O x3, NAD, sitting up in bed comfortably   Breathing non-labored   Heart sounds clear in all four auscultation sites. Some gurgling heard over aortic valve.     Plan:  Explained to pt that with his chest tubes he may begin to hear some sounds that sound unfamiliar and can be unsettling. Pt is HDS and asymptomatic. No ongoing concerns at this time.     Please page resident on call,     Moses Wong MD  Surgical Resident

## 2023-04-22 NOTE — PHARMACY-ADMISSION MEDICATION HISTORY
Pharmacist Admission Medication History    Admission medication history is complete. The information provided in this note is only as accurate as the sources available at the time of the update.    Medication reconciliation/reorder completed by provider prior to medication history? No    Information Source(s): Patient and CareEverywhere/SureScripts via in-person    Pertinent Information: Patient does not take any prescription or OTC medications regularly other than the multivitamin.    Changes made to PTA medication list:    Added: None    Deleted: None    Changed: None     Allergies reviewed with patient and updates made in EHR: yes    Medication History Completed By: Fabio Yin RPH 4/22/2023 11:56 AM    Prior to Admission medications    Medication Sig Last Dose Taking? Auth Provider Long Term End Date   multivitamin w/minerals (THERA-VIT-M) tablet Take 1 tablet by mouth daily Past Month Yes Reported, Patient

## 2023-04-22 NOTE — PROGRESS NOTES
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 No tele orders  Neuros: A/Ox4, able to make needs known. Neuros intact  Respiratory: Sats >90% on RA  GI/: No BMs since 4/20. Voiding not saving  Nutrition: Tolerating regular diet  Drains/Lines: R./L. PIV SL. 2x mediastinal CT in place- to water seal. Will be pulled out today.  Activity: SBA to independent. Ambulated x1 around hallway today   Pain/Nausea: C/o of incisional and L. Back/shoulder pain- PRN Oxycodone (10 mg), Robaxin, and IV Toradol given with relief.  Skin: Sternal incision- FERN  Social: Family at bedside  Plan of care: Possible discharge tomorrow or the day after if pain is well managed on oral meds. Will continue to monitor and follow POC.     "

## 2023-04-23 ENCOUNTER — APPOINTMENT (OUTPATIENT)
Dept: GENERAL RADIOLOGY | Facility: CLINIC | Age: 28
DRG: 828 | End: 2023-04-23
Attending: THORACIC SURGERY (CARDIOTHORACIC VASCULAR SURGERY)
Payer: COMMERCIAL

## 2023-04-23 VITALS
DIASTOLIC BLOOD PRESSURE: 64 MMHG | WEIGHT: 227.74 LBS | RESPIRATION RATE: 16 BRPM | BODY MASS INDEX: 34.52 KG/M2 | HEIGHT: 68 IN | SYSTOLIC BLOOD PRESSURE: 137 MMHG | OXYGEN SATURATION: 97 % | HEART RATE: 78 BPM | TEMPERATURE: 98 F

## 2023-04-23 PROCEDURE — 250N000011 HC RX IP 250 OP 636: Performed by: SURGERY

## 2023-04-23 PROCEDURE — 250N000013 HC RX MED GY IP 250 OP 250 PS 637: Performed by: STUDENT IN AN ORGANIZED HEALTH CARE EDUCATION/TRAINING PROGRAM

## 2023-04-23 PROCEDURE — 71045 X-RAY EXAM CHEST 1 VIEW: CPT | Mod: 26 | Performed by: RADIOLOGY

## 2023-04-23 PROCEDURE — 250N000009 HC RX 250: Performed by: THORACIC SURGERY (CARDIOTHORACIC VASCULAR SURGERY)

## 2023-04-23 PROCEDURE — 71045 X-RAY EXAM CHEST 1 VIEW: CPT | Mod: 77

## 2023-04-23 PROCEDURE — 71045 X-RAY EXAM CHEST 1 VIEW: CPT

## 2023-04-23 PROCEDURE — 250N000011 HC RX IP 250 OP 636: Performed by: STUDENT IN AN ORGANIZED HEALTH CARE EDUCATION/TRAINING PROGRAM

## 2023-04-23 PROCEDURE — 250N000013 HC RX MED GY IP 250 OP 250 PS 637: Performed by: SURGERY

## 2023-04-23 RX ORDER — OXYCODONE HYDROCHLORIDE 5 MG/1
5 TABLET ORAL EVERY 4 HOURS PRN
Qty: 45 TABLET | Refills: 0 | Status: SHIPPED | OUTPATIENT
Start: 2023-04-23 | End: 2023-07-20

## 2023-04-23 RX ORDER — IBUPROFEN 600 MG/1
600 TABLET, FILM COATED ORAL EVERY 6 HOURS
Qty: 50 TABLET | Refills: 0 | Status: SHIPPED | OUTPATIENT
Start: 2023-04-23 | End: 2023-07-20

## 2023-04-23 RX ORDER — ACETAMINOPHEN 325 MG/1
975 TABLET ORAL EVERY 6 HOURS
Qty: 100 TABLET | Refills: 0 | Status: SHIPPED | OUTPATIENT
Start: 2023-04-23 | End: 2023-07-20

## 2023-04-23 RX ORDER — POLYETHYLENE GLYCOL 3350 17 G/17G
17 POWDER, FOR SOLUTION ORAL DAILY
Qty: 510 G | Refills: 0 | Status: SHIPPED | OUTPATIENT
Start: 2023-04-23 | End: 2023-07-20

## 2023-04-23 RX ORDER — AMOXICILLIN 250 MG
2 CAPSULE ORAL 2 TIMES DAILY
Qty: 50 TABLET | Refills: 0 | Status: SHIPPED | OUTPATIENT
Start: 2023-04-23 | End: 2023-07-20

## 2023-04-23 RX ORDER — GINSENG 100 MG
CAPSULE ORAL DAILY
Status: DISCONTINUED | OUTPATIENT
Start: 2023-04-23 | End: 2023-04-23 | Stop reason: HOSPADM

## 2023-04-23 RX ORDER — METHOCARBAMOL 500 MG/1
500 TABLET, FILM COATED ORAL EVERY 6 HOURS PRN
Qty: 30 TABLET | Refills: 0 | Status: SHIPPED | OUTPATIENT
Start: 2023-04-23 | End: 2023-07-20

## 2023-04-23 RX ADMIN — ACETAMINOPHEN 975 MG: 325 TABLET, FILM COATED ORAL at 13:19

## 2023-04-23 RX ADMIN — OXYCODONE HYDROCHLORIDE 5 MG: 5 TABLET ORAL at 08:42

## 2023-04-23 RX ADMIN — OXYCODONE HYDROCHLORIDE 5 MG: 5 TABLET ORAL at 04:30

## 2023-04-23 RX ADMIN — Medication 750 MG: at 09:18

## 2023-04-23 RX ADMIN — ACETAMINOPHEN 975 MG: 325 TABLET, FILM COATED ORAL at 05:41

## 2023-04-23 RX ADMIN — OXYCODONE HYDROCHLORIDE 5 MG: 5 TABLET ORAL at 13:46

## 2023-04-23 RX ADMIN — KETOROLAC TROMETHAMINE 15 MG: 30 INJECTION, SOLUTION INTRAMUSCULAR at 15:50

## 2023-04-23 RX ADMIN — ONDANSETRON 4 MG: 2 INJECTION INTRAMUSCULAR; INTRAVENOUS at 04:36

## 2023-04-23 RX ADMIN — POLYETHYLENE GLYCOL 3350 17 G: 17 POWDER, FOR SOLUTION ORAL at 08:42

## 2023-04-23 RX ADMIN — SENNOSIDES AND DOCUSATE SODIUM 2 TABLET: 50; 8.6 TABLET ORAL at 08:42

## 2023-04-23 RX ADMIN — FAMOTIDINE 20 MG: 20 TABLET ORAL at 08:40

## 2023-04-23 RX ADMIN — BACITRACIN: 500 OINTMENT TOPICAL at 13:47

## 2023-04-23 RX ADMIN — ACETAMINOPHEN 650 MG: 325 TABLET ORAL at 08:40

## 2023-04-23 RX ADMIN — ENOXAPARIN SODIUM 40 MG: 40 INJECTION SUBCUTANEOUS at 13:19

## 2023-04-23 RX ADMIN — Medication 750 MG: at 15:50

## 2023-04-23 RX ADMIN — KETOROLAC TROMETHAMINE 15 MG: 30 INJECTION, SOLUTION INTRAMUSCULAR at 08:41

## 2023-04-23 ASSESSMENT — ACTIVITIES OF DAILY LIVING (ADL)
ADLS_ACUITY_SCORE: 22

## 2023-04-23 NOTE — PROGRESS NOTES
"BENITOJAMES Collins is a 27 year old year old male with a chief complaint of S/P medial sternotomy and thymus mass resection on 4/20      S = Situation:       What is going on with the patient? Pt remains stable and ready for discharge home today, which occurs at 1650.  Pt leaving ambulatory with his SO, Joy.  Discharge instructions are provided to both of them and they verbalized understanding, questions answered. Medications picked up from the pharmacy by the pt's S.O prior to discharging home today.       What is happening now, e.g. Chief complaints, acute change? None, pt has improved and pain is controlled and pt is ready to discharge home.       B  = Background:       Vital Signs: /64 (BP Location: Left arm, Cuff Size: Adult Regular)   Pulse 78   Temp 98  F (36.7  C) (Oral)   Resp 16   Ht 1.727 m (5' 8\")   Wt 103.3 kg (227 lb 11.8 oz)   SpO2 97%   BMI 34.63 kg/m          Nursing or medical interventions: Dressing changed to the right chest tube site prior to discharge as it was saturated.  Pt ambulating in the hallway x 2 and tolerated well.     A  =  Assessment:      What do you see? Pt remains A&O x 4 with stable vital signs.  Neuro is intact, LS-CTA/Dim,  +BM that is formed and brown today per pt report.  Up to the bathroom independently and tolerating that well with no dizziness and a steady gait noted. IV's to bilateral hands removed and sites wrapped with Coban, no bleeding noted.     Pain rated 5-8/10 with a goal of 5/10, which is achieved.   Oxycodone 5 mg po at 0842 & 1345  Toradol 15 mg IV at 0841 & 1550  Robaxin 750 mg po at 0918 & 1550  Tylenol 650 po at 0840 and then the scheduled dose administered     R =   Request or Recommendation:      What do you think should be done? Pt is discharging home after his right chest tube is pulled at about 1300 and a CXR is obtained.       "

## 2023-04-23 NOTE — OP NOTE
Preoperative diagnosis:                         Anterior mediastinal mass    Postoperative diagnosis:                       as above     Procedure:     1. Robot-assisted thoracoscopy  2. Median sternotomy, thymectomy     Anesthesia: General     Surgeon: Alexander Campoverde     Assistant surgeon: Lee Isaac MD; Andreas Beth MD; Antony Eisenberg PA-C    EBL:  50 mL     Complications: none immediate    Indications:  This is a 27 year old man with an anterior mediastinal mass. I performed an anterior thoracotomy and biopsy of the mass, which was not definitive. He presents today for complete resection.       Description of procedure  The patient was brought to the room and placed supine upon the table.  After confirming the patient's identity and verifying the consent, appropriate monitoring devices were placed as well as SCD boots.  General anesthesia was administered and the patient was intubated with a single-lumen endotracheal tube. Intravenous antibiotic was administered within one hour prior to the incision.  The patient was secured to the table while remaining supine and a foot board was placed.  The chest and abdomen was prepped with chlorhexidine and draped in standard surgical fashion.       A transverse subxiphoid incision was made and blunt dissection attempted to enter both pleural cavities, but was not successful.  Eight mm ports were placed a hand's breadth laterally in the left and right pleural spaces from outside the costal margin. On the right, it was apparent that there were significant adhesions precluding a minimally invasive procedure. I decided to convert to sternotomy and we performed a median sternotomy in the standard fashion. The sternum was retracted and both pleural cavities were entered. We proceeded to clear the adhesions and resected the fat from the diaphragm, up to about 1 cm from each phrenic nerve. The phrenic nerves were identified and as the dissection was carried cephalad, we used  clips and sharp dissection to ensure no heat injury to the nerves. We had  easily identified the innominate vein and identified branches going to the thymus  The dissection then continued cephalad and superficial to the innominate vein, identifying the bilateral horns and dissecting these free without difficulty. The mass and thymus were resected en bloc and passed off for pathologic analysis. The cavity was inspected for hemostasis, which was noted.  Two 28 Spanish Don drains were placed, one in each chest, traversing into the mediastinum.  These were secured to the skin using 0 silk suture. The sternotomy was closed with steel wires and then in layers.  The areas were cleaned and dried and sterile dressings were applied.  Sterile dressings were applied to the chest tube sites, which were connected to a single Pleur-evac.  At  the end of the case, sponge, needle, and instrument counts were correct.

## 2023-04-23 NOTE — DISCHARGE SUMMARY
NAME: Guero Collins   MRN: [id number]   : 1995     DATE OF ADMISSION: 2023     PRE/POSTOPERATIVE DIAGNOSES: mediastinal mass    PROCEDURES PERFORMED: Median Sternotomy and thymectomy    PATHOLOGY RESULTS: pending     CULTURE RESULTS: None     INTRAOPERATIVE COMPLICATIONS: None     POSTOPERATIVE COMPLICATIONS: None     DRAINS/TUBES PRESENT AT DISCHARGE: none    DATE OF DISCHARGE:  2023    HOSPITAL COURSE: Guero Collins is a 27 year old male who on 2023  underwent the above-named procedures.  He tolerated the operation well and postoperatively was transferred to the general post-surgical unit.  The remainder of his course was essentially uncomplicated.  Prior to discharge, his pain was controlled well, he was able to perform ADLs and ambulate independently without difficulty, and had full return of bowel and bladder function. His chest tubes were both removed. On 2023, he was discharged to home in stable condition.     DISCHARGE INSTRUCTIONS:  Discharge Procedure Orders   X-ray Chest 2 vws*   Standing Status: Future Standing Exp. Date: 24     Order Specific Question Answer Comments   Priority Routine      Reason for your hospital stay   Order Comments: Removal of chest mass     Activity   Order Comments: No lifting greater than 10 pounds for the first 6 weeks after surgery.  No lifting greater than 25 pounds for the next 4 weeks   No lifting restrictions after this 10 week period     Order Specific Question Answer Comments   Is discharge order? Yes      Adult [facility] Follow-up and recommended labs and tests   Order Comments: 1. Follow up with Primary Care Physician in 1-2 weeks.    2. Follow up will be arranged by the Thoracic office, we will contact you to set up the appointment.  You will follow up with SILVESTRE Hussein in 1 month with CXR prior to appointment. You may call [phone] if you wish to schedule before you are contacted.      3. Final biopsy results can take  "5-7 business days to be completed, please feel free to call our office if you have not heard from us in 2 weeks.     When to contact your care team   Order Comments: In emergencies, call 911    For other Questions or Concerns;   A.) During weekday working hours (Monday through Friday 8am to 4:30pm)   call [phone] and ask to speak to a clinical nurse specialist.     B.) At nights (after 4:30pm), on weekends, or if urgent call [phone] and   tell the  \"I would like to page the thoracic surgery fellow on call, please.\"     Wound care and dressings   Order Comments: Instructions to care for your wound at home:   Your surgical incision sites were closed with sutures under the skin that will dissolve on their own and skin glue. You can get those incisions wet, but avoid soaking/submerging them for the next 3 weeks to allow proper healing. You should also avoid rubbing or abrading the glue. It will slowly dissolve or fall off on its own. If at the incision site you start having new/different discharge/drainage, foul smell, or redness and warmth to the touch, you should call the surgery office.     Discharge Instructions   Order Comments: You are being prescribed a narcotic pain medication. This is a strong pain medication that should be used to keep your pain to a tolerable level, but no more than necessary as it can be addictive and has side effects.     We suggest you take tylenol and ibuprofen (unless you have been told not to take NSAIDS. If so, just take tylenol) alternating every 3 hours to keep your pain under control.   Ex: 9am tylenol, 12pm ibuprofen, 3pm tylenol, 6pm ibuprofen, etc.  If you are still having uncontrolled pain on top of this, take your narcotic pain medication as needed. Try to use less each day until you no longer need it.    With the narcotic pain medicine, you might experience some constipation, so you may want to take an over the counter stool softener like dulcolax, " prune juice, or a fiber supplement such as Metamucil or Benefiber to keep your bowel movements soft and regular. You should also not drive while taking this medication, as the pain medicine may dull your senses and reflexes or cause confusion. Avoid alcohol, as this can unexpectedly increase the effect of the pain medicine. Mis-using this medication or using it with alcohol or with pre-existing conditions like obstructive sleep apnea put you at high risk of respiratory compression and death. If you have any concerns, you should STOP taking this medication and use a less powerful alternative analgesic.     Incentive Spirometry   Order Comments: Continue to use incentive spirometer 8 times a day Until deep breathing is completely pain free without the need to splint     Diet   Order Comments: Follow this diet upon discharge: regular diet     Order Specific Question Answer Comments   Is discharge order? Yes        DISCHARGE MEDICATIONS:      Medication List      Started    acetaminophen 325 MG tablet  Commonly known as: TYLENOL  975 mg, Oral, EVERY 6 HOURS     ibuprofen 600 MG tablet  Commonly known as: ADVIL/MOTRIN  600 mg, Oral, EVERY 6 HOURS  Notes to patient: Toradol given in the hospital at 1600     methocarbamol 500 MG tablet  Commonly known as: ROBAXIN  500 mg, Oral, EVERY 6 HOURS PRN     oxyCODONE 5 MG tablet  Commonly known as: ROXICODONE  5 mg, Oral, EVERY 4 HOURS PRN     polyethylene glycol 17 GM/Dose powder  Commonly known as: MIRALAX  17 g, Oral, DAILY, While taking oxycodone     senna-docusate 8.6-50 MG tablet  Commonly known as: SENOKOT-S/PERICOLACE  2 tablets, Oral, 2 TIMES DAILY, While taking oxycodone. Stop if having loose stools.              Zoe Enriquez MD (PGY-3)  Surgery

## 2023-04-23 NOTE — PLAN OF CARE
NURSING PROGRESS NOTE  Shift Summary      Date: April 23, 2023     Neuro/Musculoskeletal:  A&Ox4.  Cardiac:  HR.  VSS, afebrile   Respiratory:  Sating in the 90s on RA.  GI/:  Adequate urine output.  LBM: 4/20  Diet/Appetite:  Tolerating regular diet.  Activity:  SBA.  Pain: Received scheduled Tylenol, Robaxin and Oxycodone for pain control, reported effectiveness.  Skin:  No new deficits noted. Mid sternal incision care done with antibiotic ointment.  LDAs + Drips/IVF:  R & L hand PIV SL.  Protocols/Labs: Routine    Pertinent Shift Updates: Total CT output 90 mls including late evening.      Plan: Monitor CT output, continue pain control, encourage output and IS with cough and deep breathing.      Carlie Foreman RN  .................................................... April 23, 2023   6:39 AM  [facility] ([facility]): [facility] ICU (Unit 6D)    Goal Outcome Evaluation:           Overall Patient Progress: improvingOverall Patient Progress: improving    Outcome Evaluation: Patient A/O X 4, pain well controlled with both scheduled and PRN medications. Ambulated twice to the bathroom tonight. No BM yet, passing gas.

## 2023-04-23 NOTE — PLAN OF CARE
Pt discharging home and all goals met and adequate for discharge at this time.       Problem: Plan of Care - These are the overarching goals to be used throughout the patient stay.    Goal: Absence of Hospital-Acquired Illness or Injury  Description: Pt remained free from injury during the hospital stay.   Intervention: Identify and Manage Fall Risk  Recent Flowsheet Documentation  Taken 4/23/2023 1600 by Carolina Gonzalez RN  Safety Promotion/Fall Prevention: assistive device/personal items within reach  Taken 4/23/2023 1200 by Carolina Gonzalez RN  Safety Promotion/Fall Prevention: assistive device/personal items within reach  Taken 4/23/2023 0800 by Carolina Gonzalez RN  Safety Promotion/Fall Prevention: assistive device/personal items within reach  Intervention: Prevent Skin Injury  Recent Flowsheet Documentation  Taken 4/23/2023 1600 by Carolina Gonzalez RN  Body Position: position changed independently  Taken 4/23/2023 1440 by Carolina Gonzalez RN  Body Position: position changed independently  Taken 4/23/2023 1240 by Carolina Gonzalez RN  Body Position: position changed independently  Taken 4/23/2023 1040 by Carolina Gonzalez RN  Body Position: position changed independently  Taken 4/23/2023 1000 by Carolina Gonzalez RN  Body Position: position changed independently  Taken 4/23/2023 0840 by Carolina Gonzalez RN  Body Position:   position changed independently   legs elevated   heels elevated  Intervention: Prevent and Manage VTE (Venous Thromboembolism) Risk  Recent Flowsheet Documentation  Taken 4/23/2023 1600 by Carolina Gonzalez RN  VTE Prevention/Management: SCDs (sequential compression devices) off  Taken 4/23/2023 1200 by Carolina Gonzalez RN  VTE Prevention/Management: (Pt up walking around) SCDs (sequential compression devices) off  Taken 4/23/2023 0800 by Carolina Gonzalez RN  VTE Prevention/Management:   SCDs (sequential compression devices) on   SCDs (sequential compression devices) off  Goal: Optimal Comfort and Wellbeing  Intervention: Monitor Pain and  Promote Comfort  Recent Flowsheet Documentation  Taken 4/23/2023 1550 by Carolina Gonzalez RN  Pain Management Interventions:   medication (see MAR)   emotional support   rest   repositioned  Taken 4/23/2023 0842 by Carolina Gonzalez RN  Pain Management Interventions:   medication (see MAR)   emotional support   rest   repositioned  Taken 4/23/2023 0840 by Carolina Gonzalez RN  Pain Management Interventions:   medication (see MAR)   emotional support   rest   repositioned     Problem: Pain Acute  Goal: Optimal Pain Control and Function  Description: Pt rates pain 4-5/10 and that is a functional goal for his pain; Pt up ambulating the unit prior to discharge home.   Intervention: Develop Pain Management Plan  Recent Flowsheet Documentation  Taken 4/23/2023 1550 by Carolina Gonzalez RN  Pain Management Interventions:   medication (see MAR)   emotional support   rest   repositioned  Taken 4/23/2023 0842 by Carolina Gonzalez RN  Pain Management Interventions:   medication (see MAR)   emotional support   rest   repositioned  Taken 4/23/2023 0840 by Carolina Gonzalez RN  Pain Management Interventions:   medication (see MAR)   emotional support   rest   repositioned  Intervention: Prevent or Manage Pain  Recent Flowsheet Documentation  Taken 4/23/2023 1600 by Carolina Gonzalez RN  Medication Review/Management: medications reviewed  Taken 4/23/2023 1200 by Carolina Gonzalez RN  Medication Review/Management: medications reviewed  Taken 4/23/2023 0800 by Carolina Gonzalez RN  Medication Review/Management: medications reviewed     Problem: Wound Healing Progression  Goal: Optimal Wound Healing  Description: Education provided to pt and his S.O about wound healing.   Intervention: Promote Wound Healing  Recent Flowsheet Documentation  Taken 4/23/2023 1650 by Carolina Gonzalez RN  Activity Management: (Pt discharging home with RADHA Joy) ambulated outside room  Taken 4/23/2023 1600 by Carolina Gonzalez RN  Activity Management: ambulated in room  Taken 4/23/2023 1550 by Carolina Gonzalez  RN  Pain Management Interventions:   medication (see MAR)   emotional support   rest   repositioned  Taken 4/23/2023 1440 by Carolina Gonzalez RN  Activity Management:   activity encouraged   ambulated outside room  Taken 4/23/2023 1240 by Carolina Gonzalez RN  Activity Management:   activity adjusted per tolerance   ambulated in room   ambulated to bathroom  Taken 4/23/2023 1040 by Carolina Gonzalez, ELIEZER  Activity Management:   activity encouraged   ambulated outside room  Taken 4/23/2023 1000 by Carolina Gonzalez, RN  Activity Management:   activity adjusted per tolerance   ambulated in room   up in chair  Taken 4/23/2023 0930 by Carolina Gonzalez, EILEZER  Activity Management:   activity encouraged   ambulated in room   ambulated to bathroom  Taken 4/23/2023 0842 by Carolina Gonzalez, RN  Pain Management Interventions:   medication (see MAR)   emotional support   rest   repositioned  Taken 4/23/2023 0840 by Carolina Gonzalez RN  Pain Management Interventions:   medication (see MAR)   emotional support   rest   repositioned  Activity Management: activity adjusted per tolerance   Goal Outcome Evaluation:

## 2023-04-23 NOTE — PLAN OF CARE
Goal Outcome Evaluation: Pt progressing and doing well, discharging home today at 1650 with his SO after discharge teaching is provided and they verbalize understanding.       Problem: Plan of Care - These are the overarching goals to be used throughout the patient stay.    Goal: Absence of Hospital-Acquired Illness or Injury  Description: Pt remained free from injury during the hospital stay.   Intervention: Identify and Manage Fall Risk  Recent Flowsheet Documentation  Taken 4/23/2023 1200 by Carolina Gonzalez RN  Safety Promotion/Fall Prevention: assistive device/personal items within reach  Taken 4/23/2023 0800 by Carolina Gonzalez RN  Safety Promotion/Fall Prevention: assistive device/personal items within reach  Intervention: Prevent Skin Injury  Recent Flowsheet Documentation  Taken 4/23/2023 1440 by Carolina Gonzalez RN  Body Position: position changed independently  Taken 4/23/2023 1240 by Carolina Gonzalez RN  Body Position: position changed independently  Taken 4/23/2023 1040 by Carolina Gonzalez RN  Body Position: position changed independently  Taken 4/23/2023 1000 by Carolina Gonzalez RN  Body Position: position changed independently  Taken 4/23/2023 0840 by Carolina Gonzalez RN  Body Position:   position changed independently   legs elevated   heels elevated  Intervention: Prevent and Manage VTE (Venous Thromboembolism) Risk  Recent Flowsheet Documentation  Taken 4/23/2023 1200 by Carolina Gonzalez RN  VTE Prevention/Management:   SCDs (sequential compression devices) on   SCDs (sequential compression devices) off  Taken 4/23/2023 0800 by Carolina Gonzalez RN  VTE Prevention/Management:   SCDs (sequential compression devices) on   SCDs (sequential compression devices) off  Goal: Optimal Comfort and Wellbeing  Intervention: Monitor Pain and Promote Comfort  Recent Flowsheet Documentation  Taken 4/23/2023 1550 by Carolina Gonzalez RN  Pain Management Interventions:   medication (see MAR)   emotional support   rest   repositioned  Taken 4/23/2023 0842  by Gonzalez, Carolina, RN  Pain Management Interventions:   medication (see MAR)   emotional support   rest   repositioned  Taken 4/23/2023 0840 by Carolina Gonzalez RN  Pain Management Interventions:   medication (see MAR)   emotional support   rest   repositioned     Problem: Pain Acute  Goal: Optimal Pain Control and Function  Description: Pt rates pain 4-5/10 and that is a functional goal for his pain; Pt up ambulating the unit prior to discharge home.   Intervention: Develop Pain Management Plan  Recent Flowsheet Documentation  Taken 4/23/2023 1550 by Carolina Gonzalez RN  Pain Management Interventions:   medication (see MAR)   emotional support   rest   repositioned  Taken 4/23/2023 0842 by Carolina Gonzalez RN  Pain Management Interventions:   medication (see MAR)   emotional support   rest   repositioned  Taken 4/23/2023 0840 by Carolina Gonzalez RN  Pain Management Interventions:   medication (see MAR)   emotional support   rest   repositioned  Intervention: Prevent or Manage Pain  Recent Flowsheet Documentation  Taken 4/23/2023 1200 by Carolina Gonzalez RN  Medication Review/Management: medications reviewed  Taken 4/23/2023 0800 by Carolina Gonzalez RN  Medication Review/Management: medications reviewed     Problem: Wound Healing Progression  Goal: Optimal Wound Healing  Description: Education provided to pt and his S.O about wound healing.   Intervention: Promote Wound Healing  Recent Flowsheet Documentation  Taken 4/23/2023 1550 by Carolina Gonzalez RN  Pain Management Interventions:   medication (see MAR)   emotional support   rest   repositioned  Taken 4/23/2023 1440 by Carolina Gonzalez RN  Activity Management:   activity encouraged   ambulated outside room  Taken 4/23/2023 1240 by Carolina Gonzalez RN  Activity Management:   activity adjusted per tolerance   ambulated in room   ambulated to bathroom  Taken 4/23/2023 1040 by Carolina Gonzalez RN  Activity Management:   activity encouraged   ambulated outside room  Taken 4/23/2023 1000 by Carolina Gonzlaez  RN  Activity Management:   activity adjusted per tolerance   ambulated in room   up in chair  Taken 4/23/2023 0930 by Carolina Gonzalez, RN  Activity Management:   activity encouraged   ambulated in room   ambulated to bathroom  Taken 4/23/2023 0842 by Carolina Gonzalez, RN  Pain Management Interventions:   medication (see MAR)   emotional support   rest   repositioned  Taken 4/23/2023 0840 by Carolina Gonzalez, RN  Pain Management Interventions:   medication (see MAR)   emotional support   rest   repositioned  Activity Management: activity adjusted per tolerance

## 2023-04-24 ENCOUNTER — PATIENT OUTREACH (OUTPATIENT)
Dept: CARE COORDINATION | Facility: CLINIC | Age: 28
End: 2023-04-24
Payer: COMMERCIAL

## 2023-04-24 NOTE — PROGRESS NOTES
Clinic Care Coordination Contact  [facility]: Post-Discharge Note  SITUATION                                                      Admission:    Admission Date: 04/20/23   Reason for Admission: Anterior mediastinal mass  Discharge:   Discharge Date: 04/23/23  Discharge Diagnosis: Anterior mediastinal mass    BACKGROUND                                                      Per hospital discharge summary and inpatient provider notes:    This is a 27 year old man with an anterior mediastinal mass. I performed an anterior thoracotomy and biopsy of the mass, which was not definitive. He presents today for complete resection.      ASSESSMENT           Discharge Assessment  How are you doing now that you are home?: Patient states he is feeling good. Some back pain when waking up this morning but overall is doing fairly good.  How are your symptoms? (Red Flag symptoms escalate to triage hotline per guidelines): Unchanged  Do you feel your condition is stable enough to be safe at home until your provider visit?: Yes  Does the patient have their discharge instructions? : Yes  Does the patient have questions regarding their discharge instructions? : No  Were you started on any new medications or were there changes to any of your previous medications? : Yes  Does the patient have all of their medications?: Yes  Do you have questions regarding any of your medications? : No  Do you have all of your needed medical supplies or equipment (DME)?  (i.e. oxygen tank, CPAP, cane, etc.): Yes  Discharge follow-up appointment scheduled within 14 calendar days? : No  Discharge Follow Up Appointment Date: 05/18/23  Discharge Follow Up Appointment Scheduled with?: Specialty Care Provider         Post-op (Clinicians Only)  Did the patient have surgery or a procedure: Yes  Incision: closed  Drainage: No  Bleeding: none  Fever: No  Chills: No  Redness: No  Warmth: No  Swelling: No  Incision site pain: No  Eating & Drinking: eating and  drinking without complaints/concerns  PO Intake: regular diet  Additional Symptoms:  (Denies)  Bowel Function: normal  Date of last BM: 04/23/23  Urinary Status: voiding without complaint/concerns      PLAN                                                      Outpatient Plan:  Anterior mediastinal mass   1. Follow up with Primary Care Physician in 1-2 weeks.     2. Follow up will be arranged by the Thoracic office, we will contact you to set up the appointment.  You will follow up with SILVESTRE Hussein in 1 month with CXR prior to appointment. You may call [phone] if you wish to schedule before you are contacted.       3. Final biopsy results can take 5-7 business days to be completed, please feel free to call our office if you have not heard from us in 2 weeks.      Future Appointments   Date Time Provider Department Center   5/18/2023  2:40 PM MICXR1 [facility]   5/18/2023  3:15 PM Jazzmine Murphy, [facility] CNS [facility]         For any urgent concerns, please contact our 24 hour nurse triage line: [phone] ([phone])         Lexie Brewer RN

## 2023-04-27 ENCOUNTER — OFFICE VISIT (OUTPATIENT)
Dept: FAMILY MEDICINE | Facility: CLINIC | Age: 28
End: 2023-04-27
Payer: COMMERCIAL

## 2023-04-27 VITALS
TEMPERATURE: 98.8 F | SYSTOLIC BLOOD PRESSURE: 128 MMHG | RESPIRATION RATE: 16 BRPM | HEART RATE: 73 BPM | BODY MASS INDEX: 34.68 KG/M2 | DIASTOLIC BLOOD PRESSURE: 65 MMHG | HEIGHT: 68 IN | OXYGEN SATURATION: 98 % | WEIGHT: 228.8 LBS

## 2023-04-27 DIAGNOSIS — J98.59 MEDIASTINAL MASS: Primary | ICD-10-CM

## 2023-04-27 PROCEDURE — 99213 OFFICE O/P EST LOW 20 MIN: CPT | Performed by: NURSE PRACTITIONER

## 2023-04-27 ASSESSMENT — PAIN SCALES - GENERAL: PAINLEVEL: MILD PAIN (2)

## 2023-04-27 NOTE — PROGRESS NOTES
4/24/2023     5:01 PM   Post Discharge Outreach   Admission Date 4/20/2023   Reason for Admission Anterior mediastinal mass   Discharge Date 4/23/2023   Discharge Diagnosis Anterior mediastinal mass   How are you doing now that you are home? Patient states he is feeling good. Some back pain when waking up this morning but overall is doing fairly good.   How are your symptoms? (Red Flag symptoms escalate to triage hotline per guidelines) Unchanged   Do you feel your condition is stable enough to be safe at home until your provider visit? Yes   Does the patient have their discharge instructions?  Yes   Does the patient have questions regarding their discharge instructions?  No   Were you started on any new medications or were there changes to any of your previous medications?  Yes   Does the patient have all of their medications? Yes   Do you have questions regarding any of your medications?  No   Do you have all of your needed medical supplies or equipment (DME)?  (i.e. oxygen tank, CPAP, cane, etc.) Yes   Discharge follow-up appointment scheduled within 14 calendar days?  No   Discharge Follow Up Appointment Date 5/18/2023   Discharge Follow Up Appointment Scheduled with? Specialty Care Provider     Hospital Follow-up Visit:    Hospital/Nursing Home/IP Rehab Facility: [facility]  Date of Admission: 4/20/23   Date of Discharge: 4/23/23  Reason(s) for Admission: median sternotomy and thymectomy    Was your hospitalization related to COVID-19? No   Problems taking medications regularly:  None  Medication changes since discharge: None  Problems adhering to non-medication therapy:  None    Summary of hospitalization:  [facility] discharge summary reviewed  Diagnostic Tests/Treatments reviewed.  Follow up needed: none  Other Healthcare Providers Involved in Patient s Care:         None  Update since discharge: improved.   Plan of care communicated with  patient         Assessment and Plan:     Mediastinal mass  Patient is recovering from sternotomy to remove the mediastinal mass.  Numerous incisions present from the surgery and drainage tubes appear to be healing well.  Discussed signs of infection including fever, drainage, tenderness, erythema, worsening pain.  If this occurs, he is to follow-up with his surgeon.  He continues oxycodone and methocarbamol as prescribed.  He is having normal bowel movements now.  He has a follow-up with his surgeon on 2023.        Subjective:     Guero is a 27 year old male presenting to the clinic for follow-up on hospitalization.  Patient underwent mediastinal mass excision on 2023. This required a sternotomy.  His recovery was uncomplicated.  He does have multiple incisions present.  Chest tubes have been removed.  Patient states his pain is improving.  He is eating and drinking well.  He is having normal bowel movements.  He denies difficulty breathing. He continues oxycodone and methocarbamol as prescribed per surgeon.    Reviewof Systems: A complete 14 point review of systems was obtained and is negative or as stated in the history of present illness.    Social History     Socioeconomic History     Marital status: Single     Spouse name: Not on file     Number of children: Not on file     Years of education: Not on file     Highest education level: Not on file   Occupational History     Not on file   Tobacco Use     Smoking status: Former     Types: Cigarettes, Vaping Device     Quit date: 2021     Years since quittin.4     Passive exposure: Past     Smokeless tobacco: Never     Tobacco comments:     Quit marijuana about 6 mo ago. 23 PO   Vaping Use     Vaping status: Not on file   Substance and Sexual Activity     Alcohol use: Not Currently     Drug use: Not Currently     Types: Marijuana     Comment: None in the last 6 months     Sexual activity: Not on file   Other Topics Concern     Not on file  "  Social History Narrative     Not on file     Social Determinants of Health     Financial Resource Strain: Not on file   Food Insecurity: Not on file   Transportation Needs: Not on file   Physical Activity: Not on file   Stress: Not on file   Social Connections: Not on file   Intimate Partner Violence: Not on file   Housing Stability: Not on file       Active Ambulatory Problems     Diagnosis Date Noted     Mediastinal mass 01/06/2023     Hx MRSA infection 01/04/2016     Marijuana abuse, continuous 12/19/2017     Mass of mediastinum 04/20/2023     Resolved Ambulatory Problems     Diagnosis Date Noted     No Resolved Ambulatory Problems     Past Medical History:   Diagnosis Date     Anxiety      History of COVID-19      Obesity      Pneumonia of right lower lobe due to infectious organism        Family History   Problem Relation Age of Onset     Anesthesia Reaction No family hx of      Deep Vein Thrombosis (DVT) No family hx of        Objective:     /65   Pulse 73   Temp 98.8  F (37.1  C) (Temporal)   Resp 16   Ht 1.727 m (5' 8\")   Wt 103.8 kg (228 lb 12.8 oz)   SpO2 98%   BMI 34.79 kg/m      Patient is alert, in no obvious distress.   Skin: Warm, dry.    Lungs:  Clear to auscultation. Respirations even and unlabored.  No wheezing or rales noted.   Heart:  Regular rate and rhythm.  No murmurs, S3, S4, gallops, or rubs.    Abdomen: Three horizontal incisions are present with four pressure dressings present from prior drainage tubes.  Large vertical incision is present through the sternal region.  All appear to be healing well without drainage or tenderness.               "

## 2023-05-02 LAB
PATH REPORT.COMMENTS IMP SPEC: ABNORMAL
PATH REPORT.COMMENTS IMP SPEC: YES
PATH REPORT.FINAL DX SPEC: ABNORMAL
PATH REPORT.GROSS SPEC: ABNORMAL
PATH REPORT.INTRAOP OBS SPEC DOC: ABNORMAL
PATH REPORT.MICROSCOPIC SPEC OTHER STN: ABNORMAL
PATH REPORT.RELEVANT HX SPEC: ABNORMAL
PATHOLOGY SYNOPTIC REPORT: ABNORMAL
PHOTO IMAGE: ABNORMAL

## 2023-05-04 ENCOUNTER — TELEPHONE (OUTPATIENT)
Dept: SURGERY | Facility: CLINIC | Age: 28
End: 2023-05-04
Payer: COMMERCIAL

## 2023-05-04 NOTE — TELEPHONE ENCOUNTER
Called Guero to review final pathology. I discussed the final pathology and told him that I would be discussing him at our multi-disciplinary tumor board next Tuesday to see if the larger group feels he would benefit from adjuvant radiation (given the tumor size).    I will give him another call Tuesday to let him know how the discussion went and what the group recommends as far as adjuvant radiation versus surveillance.    Guero verbalized understanding and agrees with his plan. He will await a call Tuesday.

## 2023-05-06 ENCOUNTER — NURSE TRIAGE (OUTPATIENT)
Dept: NURSING | Facility: CLINIC | Age: 28
End: 2023-05-06
Payer: COMMERCIAL

## 2023-05-06 NOTE — TELEPHONE ENCOUNTER
Pt is phoning wanting his Oxycodone refilled - pt will connect with his provider on Monday when clinic is open     No triage     Rosalina Cisneros RN  [facility] Nurse Advisor  9:28 AM 5/6/2023      Reason for Disposition    [1] Caller requesting NON-URGENT health information AND [2] PCP's office is the best resource    Additional Information    Negative: [1] Caller is not with the adult (patient) AND [2] reporting urgent symptoms    Negative: Lab result questions    Negative: Medication questions    Negative: Caller can't be reached by phone    Negative: Caller has already spoken to PCP or another triager    Negative: RN needs further essential information from caller in order to complete triage    Negative: Requesting regular office appointment    Protocols used: INFORMATION ONLY CALL - NO TRIAGE-A-

## 2023-05-10 ENCOUNTER — TELEPHONE (OUTPATIENT)
Dept: SURGERY | Facility: CLINIC | Age: 28
End: 2023-05-10
Payer: COMMERCIAL

## 2023-05-10 NOTE — TELEPHONE ENCOUNTER
Called patient to update him on the recommendation from Thoracic Oncology Conference. Guero does not need further treatment and his thymectomy surgery was curative. He will need surveillance CT scans, which he will have a new baseline CT done 3 months post operation, then will have a CT scan every 6 months for 2 years and will have an annual CT scan for 10 years. Guero was happy to hear he will not need any further treatment and was appreciative of the call.

## 2023-05-11 ENCOUNTER — MYC MEDICAL ADVICE (OUTPATIENT)
Dept: SURGERY | Facility: CLINIC | Age: 28
End: 2023-05-11
Payer: COMMERCIAL

## 2023-05-11 ENCOUNTER — NURSE TRIAGE (OUTPATIENT)
Dept: ONCOLOGY | Facility: CLINIC | Age: 28
End: 2023-05-11
Payer: COMMERCIAL

## 2023-05-18 ENCOUNTER — HOSPITAL ENCOUNTER (OUTPATIENT)
Dept: GENERAL RADIOLOGY | Facility: HOSPITAL | Age: 28
Discharge: HOME | End: 2023-05-18
Attending: THORACIC SURGERY (CARDIOTHORACIC VASCULAR SURGERY) | Admitting: THORACIC SURGERY (CARDIOTHORACIC VASCULAR SURGERY)
Payer: COMMERCIAL

## 2023-05-18 ENCOUNTER — OFFICE VISIT (OUTPATIENT)
Dept: PULMONOLOGY | Facility: CLINIC | Age: 28
End: 2023-05-18
Payer: COMMERCIAL

## 2023-05-18 VITALS
DIASTOLIC BLOOD PRESSURE: 74 MMHG | OXYGEN SATURATION: 97 % | SYSTOLIC BLOOD PRESSURE: 126 MMHG | BODY MASS INDEX: 34.48 KG/M2 | WEIGHT: 226.8 LBS | HEART RATE: 63 BPM

## 2023-05-18 DIAGNOSIS — C37 MALIGNANT THYMOMA (H): Primary | ICD-10-CM

## 2023-05-18 DIAGNOSIS — J98.59 MEDIASTINAL MASS: ICD-10-CM

## 2023-05-18 PROCEDURE — 99213 OFFICE O/P EST LOW 20 MIN: CPT | Performed by: CLINICAL NURSE SPECIALIST

## 2023-05-18 PROCEDURE — 71046 X-RAY EXAM CHEST 2 VIEWS: CPT

## 2023-05-18 NOTE — PROGRESS NOTES
THORACIC SURGERY FOLLOW UP VISIT    I saw Mr. Collins in follow-up today. The clinical summary follows:     PREOP DIAGNOSIS   Anterior mediastinal mass    PROCEDURE   Robot assisted thoracoscopy, median sternotomy thymectomy    DATE OF PROCEDURE  2023    HISTOPATHOLOGY   THYMOMA, TYPE B1 (WHO classification), 6.4 cm in greatest dimension.  - Tumor is well circumscribed, nonencapsulated and does not infiltrate adjacent fat or thymic remnant.  - Biopsy site changes.  - Margins are negative (at less than 1 mm from margins).  - AJCC tumor stage is pT1a    COMPLICATIONS  None    INTERVAL STUDIES  CXR: final report pending at time of clinic visit. To my review it appears as expected with sternal wires intact. Will review final report when available for confirmation.    Past Medical History:   Diagnosis Date     Anxiety      History of COVID-19      Mediastinal mass      Obesity      Pneumonia of right lower lobe due to infectious organism       Past Surgical History:   Procedure Laterality Date     BIOPSY  2023    lung     DAVINCI LOBECTOMY LUNG Bilateral 2023    Procedure: Median Sternotomy and thymectomy;  Surgeon: Alexander Campoverde MD;  Location: [facility] OR     THORACOTOMY ANTERIOR Right 2023    Procedure: Right anterior thoracotomy, biopsy mediastinal mass;  Surgeon: Alexander Campoverde MD;  Location: [facility] OR     wisdom teeth extraction        Social History     Socioeconomic History     Marital status: Single     Spouse name: Not on file     Number of children: Not on file     Years of education: Not on file     Highest education level: Not on file   Occupational History     Not on file   Tobacco Use     Smoking status: Former     Types: Cigarettes, Vaping Device     Quit date: 2021     Years since quittin.4     Passive exposure: Past     Smokeless tobacco: Never     Tobacco comments:     Quit marijuana about 6 mo ago. 23 PO   Vaping Use     Vaping status: Not on file   Substance and Sexual  Activity     Alcohol use: Yes     Comment: rare     Drug use: Not Currently     Types: Marijuana     Comment: None in the last 6 months     Sexual activity: Not on file   Other Topics Concern     Not on file   Social History Narrative     Not on file     Social Determinants of Health     Financial Resource Strain: Not on file   Food Insecurity: Not on file   Transportation Needs: Not on file   Physical Activity: Not on file   Stress: Not on file   Social Connections: Not on file   Intimate Partner Violence: Not on file   Housing Stability: Not on file      SUBJECTIVE   Guero is doing really good. He has not needed pain medication for a couple of weeks-he didn't even take ibuprofen this morning. He is curious about how long his lifting restrictions should stay in place. He still has 2 more weeks of nothing over 10 pounds and then 4 weeks of nothing over 20 pounds.     OBJECTIVE  /74 (BP Location: Left arm, Patient Position: Sitting, Cuff Size: Adult Regular)   Pulse 63   Wt 102.9 kg (226 lb 12.8 oz)   SpO2 97%   BMI 34.48 kg/m       His incisions is still healing. There is still a decent sized scab over the majority of the incision. There is no concern for infection.    From a personal perspective, he is here with his partner, Chantelle.    IMPRESSION   28 year-old male status post robot assisted thoracoscopy, median sternotomy and thymectomy for a pT1a (stage I) thymoma, Type B. He is here today for post operative follow up. His case was discussed at our multi-disciplinary tumor board and the group agreed there is no role for adjuvant radiation therapy. He will be followed with CT imaging every 6 months for 2 years and annually for 10 years.    I suggested he apply a vitamin E oil to the incision to help with the healing process and soften the scab. I also cautioned against exposing the incision to the sun as this can cause permanent darkening of the scar.     PLAN  I spent 25 min on the date of the encounter  in chart review, patient visit, review of tests, documentation and/or discussion with other providers about the issues documented above. I reviewed the plan as follows:  Follow up in 2 months with a new baseline chest CT. Continue lifting restrictions until I see you in 2 months.  All questions were answered and the patient and present family were in agreement with the plan.  I appreciate the opportunity to participate in the care of your patient and will keep you updated.  Sincerely,

## 2023-05-31 NOTE — PROGRESS NOTES
Pt reports he is missing his belongings including cloth, shoe and eye glasses. PACU called yesterday and today. Unable to locate belongings.   Pt says his staff was placed in hospital bag in [facility]ites[facility] OP. Came via bed to his current room. Nursing Supervisor updated by CN.    No risk alerts present

## 2023-07-18 NOTE — PROGRESS NOTES
THORACIC SURGERY FOLLOW UP VISIT    I saw Mr. Collins in follow-up today. The clinical summary follows:     PREOP DIAGNOSIS   Anterior mediastinal mass    PROCEDURE   Robot assisted thoracoscopy, median sternotomy thymectomy    DATE OF PROCEDURE  2023    HISTOPATHOLOGY   THYMOMA, TYPE B1 (WHO classification), 6.4 cm in greatest dimension.  - Tumor is well circumscribed, nonencapsulated and does not infiltrate adjacent fat or thymic remnant.  - Biopsy site changes.  - Margins are negative (at less than 1 mm from margins).  - AJCC tumor stage is pT1a    COMPLICATIONS  None    INTERVAL STUDIES  CT chest: Interval resection of anterior mediastinal thymoma. Small amount of soft tissue thickening and fluid in the anterior mediastinum and right costophrenic angle is most likely postsurgical changes, although difficult to confirm with certainty as this is the   first CT since resection. Attention on follow-up.    Past Medical History:   Diagnosis Date     Anxiety      History of COVID-19      Mediastinal mass      Obesity      Pneumonia of right lower lobe due to infectious organism       Past Surgical History:   Procedure Laterality Date     BIOPSY  2023    lung     DAVINCI LOBECTOMY LUNG Bilateral 2023    Procedure: Median Sternotomy and thymectomy;  Surgeon: Alexander Campoverde MD;  Location: [facility] OR     THORACOTOMY ANTERIOR Right 2023    Procedure: Right anterior thoracotomy, biopsy mediastinal mass;  Surgeon: Alexander Campoverde MD;  Location: [facility] OR     wisdom teeth extraction       Social History     Socioeconomic History     Marital status: Single     Spouse name: Not on file     Number of children: Not on file     Years of education: Not on file     Highest education level: Not on file   Occupational History     Not on file   Tobacco Use     Smoking status: Former     Types: Cigarettes, Vaping Device     Quit date: 2021     Years since quittin.6     Passive exposure: Past     Smokeless tobacco:  Never     Tobacco comments:     Quit marijuana about 6 mo ago. 2/13/23 PO   Substance and Sexual Activity     Alcohol use: Yes     Comment: rare     Drug use: Not Currently     Types: Marijuana     Comment: None in the last 6 months     Sexual activity: Not on file   Other Topics Concern     Not on file   Social History Narrative     Not on file     Social Determinants of Health     Financial Resource Strain: Not on file   Food Insecurity: Not on file   Transportation Needs: Not on file   Physical Activity: Not on file   Stress: Not on file   Social Connections: Not on file   Intimate Partner Violence: Not on file   Housing Stability: Not on file       SUBJECTIVE   Guero is doing great. He has no complaints.    OBJECTIVE  /70 (BP Location: Left arm, Patient Position: Sitting, Cuff Size: Adult Regular)   Pulse 62   Wt 107.3 kg (236 lb 9.6 oz)   SpO2 98%   BMI 35.97 kg/m       From a personal perspective, he is wondering if his weight restrictions can be removed today. He doesn't plan on doing aggressive weight lifting but would like to know if it is ok for him to lift himself out of a pool and things of that nature.    IMPRESSION  28 year-old male with a pT1a type B1 thymoma status post robot assisted thoracoscopy median sternotomy thymectomy. He is here for thymoma surveillance. We will get a chest CT every 6 months for 2 years and annually for a total of 10 years surveillance.    PLAN  I spent 15 min on the date of the encounter in chart review, patient visit, review of tests, documentation and/or discussion with other providers about the issues documented above. I reviewed the plan as follows:  Follow up with me in 6 months with chest CT prior. No weight restrictions.  All questions were answered and the patient and present family were in agreement with the plan.  I appreciate the opportunity to participate in the care of your patient and will keep you updated.  Sincerely,

## 2023-07-20 ENCOUNTER — HOSPITAL ENCOUNTER (OUTPATIENT)
Dept: CT IMAGING | Facility: HOSPITAL | Age: 28
Discharge: HOME | End: 2023-07-20
Attending: CLINICAL NURSE SPECIALIST | Admitting: CLINICAL NURSE SPECIALIST
Payer: COMMERCIAL

## 2023-07-20 ENCOUNTER — OFFICE VISIT (OUTPATIENT)
Dept: PULMONOLOGY | Facility: CLINIC | Age: 28
End: 2023-07-20
Payer: COMMERCIAL

## 2023-07-20 VITALS
DIASTOLIC BLOOD PRESSURE: 70 MMHG | WEIGHT: 236.6 LBS | SYSTOLIC BLOOD PRESSURE: 122 MMHG | HEART RATE: 62 BPM | BODY MASS INDEX: 35.97 KG/M2 | OXYGEN SATURATION: 98 %

## 2023-07-20 DIAGNOSIS — D49.89 THYMOMA: Primary | ICD-10-CM

## 2023-07-20 DIAGNOSIS — C37 MALIGNANT THYMOMA (H): ICD-10-CM

## 2023-07-20 PROCEDURE — 71260 CT THORAX DX C+: CPT

## 2023-07-20 PROCEDURE — 250N000011 HC RX IP 250 OP 636: Performed by: CLINICAL NURSE SPECIALIST

## 2023-07-20 PROCEDURE — 99213 OFFICE O/P EST LOW 20 MIN: CPT | Performed by: CLINICAL NURSE SPECIALIST

## 2023-07-20 RX ORDER — IOPAMIDOL 755 MG/ML
90 INJECTION, SOLUTION INTRAVASCULAR ONCE
Status: COMPLETED | OUTPATIENT
Start: 2023-07-20 | End: 2023-07-20

## 2023-07-20 RX ADMIN — IOPAMIDOL 90 ML: 755 INJECTION, SOLUTION INTRAVENOUS at 13:45

## 2023-07-20 NOTE — PATIENT INSTRUCTIONS
Ruben Jack,    Here is an information sheet about Thymoma. Another good resource would be the [employer] (NCI) Thymoma and Thymic Carcinoma Treatment - NCI (cancer.gov)     Was nice to see you today!    Jazzmine

## 2023-08-03 ENCOUNTER — APPOINTMENT (OUTPATIENT)
Dept: CT IMAGING | Facility: CLINIC | Age: 28
End: 2023-08-03
Attending: EMERGENCY MEDICINE
Payer: COMMERCIAL

## 2023-08-03 ENCOUNTER — HOSPITAL ENCOUNTER (EMERGENCY)
Facility: CLINIC | Age: 28
Discharge: HOME | End: 2023-08-03
Attending: EMERGENCY MEDICINE | Admitting: EMERGENCY MEDICINE
Payer: COMMERCIAL

## 2023-08-03 VITALS
WEIGHT: 230 LBS | TEMPERATURE: 98.2 F | BODY MASS INDEX: 34.97 KG/M2 | RESPIRATION RATE: 20 BRPM | DIASTOLIC BLOOD PRESSURE: 66 MMHG | OXYGEN SATURATION: 96 % | HEART RATE: 68 BPM | SYSTOLIC BLOOD PRESSURE: 141 MMHG

## 2023-08-03 DIAGNOSIS — J90 PLEURAL EFFUSION, LEFT: ICD-10-CM

## 2023-08-03 DIAGNOSIS — R09.1 PLEURISY: ICD-10-CM

## 2023-08-03 LAB
ALBUMIN SERPL-MCNC: 4.2 G/DL (ref 3.5–5)
ALBUMIN UR-MCNC: NEGATIVE MG/DL
ALP SERPL-CCNC: 68 U/L (ref 45–120)
ALT SERPL W P-5'-P-CCNC: 16 U/L (ref 0–45)
ANION GAP SERPL CALCULATED.3IONS-SCNC: 9 MMOL/L (ref 5–18)
APPEARANCE UR: CLEAR
AST SERPL W P-5'-P-CCNC: 16 U/L (ref 0–40)
ATRIAL RATE - MUSE: 62 BPM
BASOPHILS # BLD AUTO: 0.1 10E3/UL (ref 0–0.2)
BASOPHILS NFR BLD AUTO: 1 %
BILIRUB SERPL-MCNC: 0.4 MG/DL (ref 0–1)
BILIRUB UR QL STRIP: NEGATIVE
BUN SERPL-MCNC: 15 MG/DL (ref 8–22)
C REACTIVE PROTEIN LHE: 0.5 MG/DL (ref 0–?)
CALCIUM SERPL-MCNC: 9 MG/DL (ref 8.5–10.5)
CHLORIDE BLD-SCNC: 106 MMOL/L (ref 98–107)
CO2 SERPL-SCNC: 25 MMOL/L (ref 22–31)
COLOR UR AUTO: COLORLESS
CREAT SERPL-MCNC: 0.8 MG/DL (ref 0.7–1.3)
D DIMER PPP FEU-MCNC: 0.38 UG/ML FEU (ref 0–0.5)
DIASTOLIC BLOOD PRESSURE - MUSE: NORMAL MMHG
EOSINOPHIL # BLD AUTO: 0.2 10E3/UL (ref 0–0.7)
EOSINOPHIL NFR BLD AUTO: 2 %
ERYTHROCYTE [DISTWIDTH] IN BLOOD BY AUTOMATED COUNT: 14.5 % (ref 10–15)
GFR SERPL CREATININE-BSD FRML MDRD: >90 ML/MIN/1.73M2
GLUCOSE BLD-MCNC: 104 MG/DL (ref 70–125)
GLUCOSE UR STRIP-MCNC: NEGATIVE MG/DL
HCT VFR BLD AUTO: 43 % (ref 40–53)
HGB BLD-MCNC: 14.3 G/DL (ref 13.3–17.7)
HGB UR QL STRIP: NEGATIVE
IMM GRANULOCYTES # BLD: 0 10E3/UL
IMM GRANULOCYTES NFR BLD: 0 %
INTERPRETATION ECG - MUSE: NORMAL
KETONES UR STRIP-MCNC: NEGATIVE MG/DL
LEUKOCYTE ESTERASE UR QL STRIP: NEGATIVE
LIPASE SERPL-CCNC: 37 U/L (ref 0–52)
LYMPHOCYTES # BLD AUTO: 3.1 10E3/UL (ref 0.8–5.3)
LYMPHOCYTES NFR BLD AUTO: 34 %
MCH RBC QN AUTO: 28.7 PG (ref 26.5–33)
MCHC RBC AUTO-ENTMCNC: 33.3 G/DL (ref 31.5–36.5)
MCV RBC AUTO: 86 FL (ref 78–100)
MONOCYTES # BLD AUTO: 1.1 10E3/UL (ref 0–1.3)
MONOCYTES NFR BLD AUTO: 11 %
NEUTROPHILS # BLD AUTO: 4.9 10E3/UL (ref 1.6–8.3)
NEUTROPHILS NFR BLD AUTO: 52 %
NITRATE UR QL: NEGATIVE
NRBC # BLD AUTO: 0 10E3/UL
NRBC BLD AUTO-RTO: 0 /100
P AXIS - MUSE: 55 DEGREES
PH UR STRIP: 7 [PH] (ref 5–7)
PLATELET # BLD AUTO: 323 10E3/UL (ref 150–450)
POTASSIUM BLD-SCNC: 4.3 MMOL/L (ref 3.5–5)
PR INTERVAL - MUSE: 156 MS
PROT SERPL-MCNC: 7 G/DL (ref 6–8)
QRS DURATION - MUSE: 108 MS
QT - MUSE: 386 MS
QTC - MUSE: 391 MS
R AXIS - MUSE: 59 DEGREES
RBC # BLD AUTO: 4.99 10E6/UL (ref 4.4–5.9)
RBC URINE: <1 /HPF
SODIUM SERPL-SCNC: 140 MMOL/L (ref 136–145)
SP GR UR STRIP: 1.02 (ref 1–1.03)
SQUAMOUS EPITHELIAL: <1 /HPF
SYSTOLIC BLOOD PRESSURE - MUSE: NORMAL MMHG
T AXIS - MUSE: 49 DEGREES
UROBILINOGEN UR STRIP-MCNC: <2 MG/DL
VENTRICULAR RATE- MUSE: 62 BPM
WBC # BLD AUTO: 9.4 10E3/UL (ref 4–11)
WBC URINE: 0 /HPF

## 2023-08-03 PROCEDURE — 36415 COLL VENOUS BLD VENIPUNCTURE: CPT | Performed by: EMERGENCY MEDICINE

## 2023-08-03 PROCEDURE — 93005 ELECTROCARDIOGRAM TRACING: CPT | Performed by: EMERGENCY MEDICINE

## 2023-08-03 PROCEDURE — 85025 COMPLETE CBC W/AUTO DIFF WBC: CPT | Performed by: EMERGENCY MEDICINE

## 2023-08-03 PROCEDURE — 86140 C-REACTIVE PROTEIN: CPT | Performed by: EMERGENCY MEDICINE

## 2023-08-03 PROCEDURE — 74177 CT ABD & PELVIS W/CONTRAST: CPT

## 2023-08-03 PROCEDURE — 99285 EMERGENCY DEPT VISIT HI MDM: CPT | Mod: 25

## 2023-08-03 PROCEDURE — 71275 CT ANGIOGRAPHY CHEST: CPT

## 2023-08-03 PROCEDURE — 85379 FIBRIN DEGRADATION QUANT: CPT | Performed by: EMERGENCY MEDICINE

## 2023-08-03 PROCEDURE — 83690 ASSAY OF LIPASE: CPT | Performed by: EMERGENCY MEDICINE

## 2023-08-03 PROCEDURE — 81001 URINALYSIS AUTO W/SCOPE: CPT | Performed by: EMERGENCY MEDICINE

## 2023-08-03 PROCEDURE — 80053 COMPREHEN METABOLIC PANEL: CPT | Performed by: EMERGENCY MEDICINE

## 2023-08-03 PROCEDURE — 250N000011 HC RX IP 250 OP 636: Mod: JZ | Performed by: EMERGENCY MEDICINE

## 2023-08-03 RX ORDER — IOPAMIDOL 755 MG/ML
90 INJECTION, SOLUTION INTRAVASCULAR ONCE
Status: COMPLETED | OUTPATIENT
Start: 2023-08-03 | End: 2023-08-03

## 2023-08-03 RX ADMIN — IOPAMIDOL 90 ML: 755 INJECTION, SOLUTION INTRAVENOUS at 04:20

## 2023-08-03 ASSESSMENT — ACTIVITIES OF DAILY LIVING (ADL): ADLS_ACUITY_SCORE: 35

## 2023-08-03 NOTE — ED PROVIDER NOTES
EMERGENCY DEPARTMENT ENCOUnter      NAME: Guero Collins  AGE: 28 year old male  YOB: 1995  MRN: [id number]  EVALUATION DATE & TIME: No admission date for patient encounter.    PCP: No Ref-Primary, Physician    ED PROVIDER: Lisa Chavez MD      Chief Complaint   Patient presents with    Flank Pain         FINAL IMPRESSION:  1. Pleurisy    2. Pleural effusion, left          ED COURSE & MEDICAL DECISION MAKING:      In summary, the patient is a 28-year-old male that presents to the emergency department for evaluation of left-sided flank pain.I suspect his flank pain is likely secondary to a small pleural effusion and atelectasis noted on the CT scan, which is likely causing some pleuritic chest pain.  Recommended close outpatient follow-up with his primary care and pulmonologist.  Patient is comfortable with this plan.  0256-I met with the patient, obtained history, performed an initial exam, and discussed options and plan for diagnostics and treatment here in the ED. offered pain medication which the patient declined.  0457-I updated and discharged the patient.    Medical Decision Making    History:  Supplemental history from: Documented in chart, if applicable  External Record(s) reviewed: Documented in chart, if applicable.    Work Up:  Chart documentation includes differential considered and any EKGs or imaging independently interpreted by provider, where specified.  In additional to work up documented, I considered the following work up: Documented in chart, if applicable.    External consultation:  Discussion of management with another provider: Documented in chart, if applicable    Complicating factors:  Care impacted by chronic illness: N/A  Care affected by social determinants of health: Access to Medical Care    Disposition considerations: Discharge. No recommendations on prescription strength medication(s). See documentation for any additional details.          At the conclusion of  the encounter I discussed the results of all of the tests and the disposition. The questions were answered. The patient or family acknowledged understanding and was agreeable with the care plan.         MEDICATIONS GIVEN IN THE EMERGENCY:  Medications   iopamidol (ISOVUE-370) solution 90 mL (90 mLs Intravenous $Given 8/3/23 6760)       NEW PRESCRIPTIONS STARTED AT TODAY'S ER VISIT  New Prescriptions    No medications on file          =================================================================    HPI        Guero Collins is a 28 year old male with a pertinent history of thymoma who presents to this ED via walk-in for evaluation of flank pain.      The patient developed an onset of deep left sided flank pain around 11 PM last night. He took 2 dosages of ibuprofen at that time. He was woken up due to the pain and that prompted him to coming in the ER. He feels fine at this moment because he is sitting upright. He notes laying down worsens his flank pain. He had a normal bowel movement today.    He had a mediastinal mass removal on 04/20/2023 and reports doing well  status/post. He denied any other medical history. No known allergies to medication. He is not taking any chronic medication. He does not smoke. He drinks alcohol occasionally.    Otherwise, the patient denied having shortness of breath, cough, abdominal pain, fever, chills, urinary symptoms, and any other medical complaints or concerns at this time.    Per chart review, the patient had an on office visit with Pulmonology on 07/20/2023 for follow up of thymoma. He presents with a pT1a type B1 thymoma status post robot assisted thoracoscopy median sternotomy thymectomy on 04/20/2023. He is here for thymoma surveillance. We will get a chest CT every 6 months for 2 years and annually for a total of 10 years surveillance. Otherwise, the patient reports doing well thus far and has no other complaints.    CT Chest w contrast on 07/20/2023    Impression:  Interval resection of anterior mediastinal thymoma. Small amount of soft tissue thickening and fluid in the anterior mediastinum and right costophrenic angle is most likely postsurgical changes, although difficult to confirm with certainty as this is the    first CT since resection. Attention on follow-up.     REVIEW OF SYSTEMS     Constitutional:  Denies fever or chills  HENT:  Denies sore throat   Respiratory:  Denies cough or shortness of breath   Cardiovascular:  Denies chest pain or palpitations  GI:  Denies abdominal pain, nausea, or vomiting. Positive for left flank pain.  Musculoskeletal:  Denies any new extremity pain   Skin:  Denies rash   Neurologic:  Denies headache, focal weakness or sensory changes    All other systems reviewed and are negative      PAST MEDICAL HISTORY:  Past Medical History:   Diagnosis Date    Anxiety     History of COVID-19     Mediastinal mass     Obesity     Pneumonia of right lower lobe due to infectious organism        PAST SURGICAL HISTORY:  Past Surgical History:   Procedure Laterality Date    BIOPSY  01/09/2023    lung    DAVINCI LOBECTOMY LUNG Bilateral 4/20/2023    Procedure: Median Sternotomy and thymectomy;  Surgeon: Alexander Campoverde MD;  Location: [facility] OR    THORACOTOMY ANTERIOR Right 2/17/2023    Procedure: Right anterior thoracotomy, biopsy mediastinal mass;  Surgeon: Alexander Campoverde MD;  Location: [facility] OR    wisdom teeth extraction             CURRENT MEDICATIONS:    multivitamin w/minerals (THERA-VIT-M) tablet        ALLERGIES:  No Known Allergies    FAMILY HISTORY:  Family History   Problem Relation Age of Onset    No Known Problems Mother     No Known Problems Father     Coronary Artery Disease Paternal Grandfather     Diabetes Paternal Grandfather     Anesthesia Reaction No family hx of     Deep Vein Thrombosis (DVT) No family hx of        SOCIAL HISTORY:   Social History     Socioeconomic History    Marital status: Single     Spouse name: None    Number of children:  None    Years of education: None    Highest education level: None   Tobacco Use    Smoking status: Former     Types: Cigarettes, Vaping Device     Quit date: 2021     Years since quittin.6     Passive exposure: Past    Smokeless tobacco: Never    Tobacco comments:     Quit marijuana about 6 mo ago. 23 PO   Substance and Sexual Activity    Alcohol use: Yes     Comment: rare    Drug use: Not Currently     Types: Marijuana     Comment: None in the last 6 months       VITALS:  Patient Vitals for the past 24 hrs:   BP Temp Temp src Pulse Resp SpO2 Weight   23 0256 (!) 141/66 98.2  F (36.8  C) Oral 68 14 97 % 104.3 kg (230 lb)       PHYSICAL EXAM    Constitutional:  Well developed, Well nourished,  HENT:  Normocephalic, Atraumatic, Bilateral external ears normal, Oropharynx moist, Nose normal.   Neck:  Normal range of motion, No meningismus, No stridor.   Eyes:  EOMI, Conjunctiva normal, No discharge.   Respiratory:  Normal breath sounds, No respiratory distress, No wheezing, No chest tenderness.   Cardiovascular:  Normal heart rate, Normal rhythm, No murmurs  GI:  Soft, No tenderness, No guarding, No CVA tenderness.   Musculoskeletal:  Neurovascularly intact distally, No edema, No tenderness, No cyanosis, Good range of motion in all major joints.   Integument:  Warm, Dry, No erythema, No rash.   Lymphatic:  No lymphadenopathy noted.   Neurologic:  Alert & oriented x 3, Normal motor function, Normal sensory function, No focal deficits noted.   Psychiatric:  Affect normal, Judgment normal, Mood normal.      LAB:  All pertinent labs reviewed and interpreted.  Results for orders placed or performed during the hospital encounter of 23   CT Chest Pulmonary Embolism w Contrast    Impression    IMPRESSION:  1.  No pulmonary emboli on either side. Mild diffuse thickening of the bronchi. Tiny left pleural effusion has developed with associated passive atelectasis in the adjacent left base. Small effusion  has developed in the right base adjacent to the hepatic   dome.    2.  Normal caliber thoracic aorta without aneurysm or dissection. Normal cardiac size. No pericardial effusion.    3.  Postoperative changes anterior mediastinum. Sternotomy. Mild retrosternal soft tissue thickening suggested previously has improved.   CT Abdomen Pelvis w Contrast    Impression    IMPRESSION:   1.  Trace left effusion and small amount of fluid right cardiophrenic region. See CT chest for full thoracic assessment.  2.  No inflammatory change in the abdomen and pelvis. No bowel obstruction or abscess.   UA with Microscopic reflex to Culture    Specimen: Urine, NOS   Result Value Ref Range    Color Urine Colorless Colorless, Straw, Light Yellow, Yellow    Appearance Urine Clear Clear    Glucose Urine Negative Negative mg/dL    Bilirubin Urine Negative Negative    Ketones Urine Negative Negative mg/dL    Specific Gravity Urine 1.020 1.005 - 1.030    Blood Urine Negative Negative    pH Urine 7.0 5.0 - 7.0    Protein Albumin Urine Negative Negative mg/dL    Urobilinogen Urine <2.0 <2.0 mg/dL    Nitrite Urine Negative Negative    Leukocyte Esterase Urine Negative Negative    RBC Urine <1 <=2 /HPF    WBC Urine 0 <=5 /HPF    Squamous Epithelials Urine <1 <=1 /HPF   D dimer quantitative   Result Value Ref Range    D-Dimer Quantitative 0.38 0.00 - 0.50 ug/mL FEU   Comprehensive metabolic panel   Result Value Ref Range    Sodium 140 136 - 145 mmol/L    Potassium 4.3 3.5 - 5.0 mmol/L    Chloride 106 98 - 107 mmol/L    Carbon Dioxide (CO2) 25 22 - 31 mmol/L    Anion Gap 9 5 - 18 mmol/L    Urea Nitrogen 15 8 - 22 mg/dL    Creatinine 0.80 0.70 - 1.30 mg/dL    Calcium 9.0 8.5 - 10.5 mg/dL    Glucose 104 70 - 125 mg/dL    Alkaline Phosphatase 68 45 - 120 U/L    AST 16 0 - 40 U/L    ALT 16 0 - 45 U/L    Protein Total 7.0 6.0 - 8.0 g/dL    Albumin 4.2 3.5 - 5.0 g/dL    Bilirubin Total 0.4 0.0 - 1.0 mg/dL    GFR Estimate >90 >60 mL/min/1.73m2   Result  Value Ref Range    Lipase 37 0 - 52 U/L   CRP inflammation   Result Value Ref Range    CRP 0.5 0.0 - <0.8 mg/dL   CBC with platelets and differential   Result Value Ref Range    WBC Count 9.4 4.0 - 11.0 10e3/uL    RBC Count 4.99 4.40 - 5.90 10e6/uL    Hemoglobin 14.3 13.3 - 17.7 g/dL    Hematocrit 43.0 40.0 - 53.0 %    MCV 86 78 - 100 fL    MCH 28.7 26.5 - 33.0 pg    MCHC 33.3 31.5 - 36.5 g/dL    RDW 14.5 10.0 - 15.0 %    Platelet Count 323 150 - 450 10e3/uL    % Neutrophils 52 %    % Lymphocytes 34 %    % Monocytes 11 %    % Eosinophils 2 %    % Basophils 1 %    % Immature Granulocytes 0 %    NRBCs per 100 WBC 0 <1 /100    Absolute Neutrophils 4.9 1.6 - 8.3 10e3/uL    Absolute Lymphocytes 3.1 0.8 - 5.3 10e3/uL    Absolute Monocytes 1.1 0.0 - 1.3 10e3/uL    Absolute Eosinophils 0.2 0.0 - 0.7 10e3/uL    Absolute Basophils 0.1 0.0 - 0.2 10e3/uL    Absolute Immature Granulocytes 0.0 <=0.4 10e3/uL    Absolute NRBCs 0.0 10e3/uL       RADIOLOGY:  I have independently reviewed and interpreted the above imaging, pending the final radiology read.  CT Abdomen Pelvis w Contrast   Final Result   IMPRESSION:    1.  Trace left effusion and small amount of fluid right cardiophrenic region. See CT chest for full thoracic assessment.   2.  No inflammatory change in the abdomen and pelvis. No bowel obstruction or abscess.      CT Chest Pulmonary Embolism w Contrast   Final Result   IMPRESSION:   1.  No pulmonary emboli on either side. Mild diffuse thickening of the bronchi. Tiny left pleural effusion has developed with associated passive atelectasis in the adjacent left base. Small effusion has developed in the right base adjacent to the hepatic    dome.      2.  Normal caliber thoracic aorta without aneurysm or dissection. Normal cardiac size. No pericardial effusion.      3.  Postoperative changes anterior mediastinum. Sternotomy. Mild retrosternal soft tissue thickening suggested previously has improved.          EKG:       0331-rate is 62, sinus, there is no ST segment elevation or depression appreciated incomplete right bundle branch block  I have independently reviewed and interpreted this EKG          I, Felice Shin, am serving as a scribe to document services personally performed by Dr. Chavez based on my observation and the provider's statements to me. I, Lisa Chavez MD attest that Felice Shin is acting in a scribe capacity, has observed my performance of the services and has documented them in accordance with my direction.    Lisa Chavez MD  Emergency Medicine  [facility] EMERGENCY ROOM  [facility] [facility] [address] [phone]  Dept: [phone]       Lisa Chavez MD  08/03/23 8051

## 2023-08-03 NOTE — DISCHARGE INSTRUCTIONS
Please follow-up with your pulmonologist and primary care within the next week  Tylenol 650 mg every 4 hours as needed for pain  Ibuprofen 600 mg every 6 hours as needed for pain  Return to the emergency department for worsening problems or concerns

## 2023-08-03 NOTE — ED TRIAGE NOTES
Left sided flank pain. Started at 2200. Was sitting, getting ready for bed when left flank pain started. Denies nausea. Denies urinary symptoms.   Pt is survivor of chest surgery last April. Denies nausea.

## 2024-01-18 ENCOUNTER — HOSPITAL ENCOUNTER (OUTPATIENT)
Dept: CT IMAGING | Facility: HOSPITAL | Age: 29
Discharge: HOME | End: 2024-01-18
Attending: CLINICAL NURSE SPECIALIST | Admitting: CLINICAL NURSE SPECIALIST
Payer: COMMERCIAL

## 2024-01-18 ENCOUNTER — OFFICE VISIT (OUTPATIENT)
Dept: PULMONOLOGY | Facility: CLINIC | Age: 29
End: 2024-01-18
Payer: COMMERCIAL

## 2024-01-18 VITALS
WEIGHT: 248 LBS | HEART RATE: 66 BPM | OXYGEN SATURATION: 97 % | SYSTOLIC BLOOD PRESSURE: 132 MMHG | BODY MASS INDEX: 37.71 KG/M2 | DIASTOLIC BLOOD PRESSURE: 68 MMHG

## 2024-01-18 DIAGNOSIS — D49.89 THYMOMA: ICD-10-CM

## 2024-01-18 DIAGNOSIS — D49.89 THYMOMA: Primary | ICD-10-CM

## 2024-01-18 PROCEDURE — 250N000011 HC RX IP 250 OP 636: Performed by: CLINICAL NURSE SPECIALIST

## 2024-01-18 PROCEDURE — 99213 OFFICE O/P EST LOW 20 MIN: CPT | Performed by: CLINICAL NURSE SPECIALIST

## 2024-01-18 PROCEDURE — 71260 CT THORAX DX C+: CPT

## 2024-01-18 RX ORDER — IOPAMIDOL 755 MG/ML
90 INJECTION, SOLUTION INTRAVASCULAR ONCE
Status: COMPLETED | OUTPATIENT
Start: 2024-01-18 | End: 2024-01-18

## 2024-01-18 RX ADMIN — IOPAMIDOL 90 ML: 755 INJECTION, SOLUTION INTRAVENOUS at 13:48

## 2024-01-18 NOTE — PROGRESS NOTES
THORACIC SURGERY FOLLOW UP VISIT      I saw Mr. Collins in follow-up today. The clinical summary follows:     PREOP DIAGNOSIS   Anterior mediastinal mass  PROCEDURE   Robot assisted thoracoscopic median sternotomy thymectomy    DATE OF PROCEDURE  2023    HISTOPATHOLOGY   THYMOMA, TYPE B1 (WHO classification), 6.4 cm in greatest dimension.  - Tumor is well circumscribed, nonencapsulated and does not infiltrate adjacent fat or thymic remnant.  - Biopsy site changes.  - Margins are negative (at less than 1 mm from margins).  - AJCC tumor stage is pT1a    COMPLICATIONS  None    INTERVAL STUDIES  CT chest: final report pending at time of clinic visit. To my review, it appears that the previously seen soft tissue thickening in the anterior mediastinum has resolved. I do not see anything concerning for recurrence.    Past Medical History:   Diagnosis Date    Anxiety     History of COVID-19     Mediastinal mass     Obesity     Pneumonia of right lower lobe due to infectious organism       Past Surgical History:   Procedure Laterality Date    BIOPSY  2023    lung    DAVINCI LOBECTOMY LUNG Bilateral 2023    Procedure: Median Sternotomy and thymectomy;  Surgeon: Alexander Campoverde MD;  Location: [facility] OR    THORACOTOMY ANTERIOR Right 2023    Procedure: Right anterior thoracotomy, biopsy mediastinal mass;  Surgeon: Alexander Campoverde MD;  Location: [facility] OR    wisdom teeth extraction        Social History     Socioeconomic History    Marital status: Single     Spouse name: Not on file    Number of children: Not on file    Years of education: Not on file    Highest education level: Not on file   Occupational History    Not on file   Tobacco Use    Smoking status: Former     Types: Cigarettes, Vaping Device     Quit date: 2021     Years since quittin.1     Passive exposure: Past    Smokeless tobacco: Never    Tobacco comments:     Quit marijuana about 6 mo ago. 23 PO   Vaping Use    Vaping Use: Former    Substance and Sexual Activity    Alcohol use: Yes     Comment: rare    Drug use: Not Currently     Types: Marijuana     Comment: None in the last 6 months    Sexual activity: Not on file   Other Topics Concern    Not on file   Social History Narrative    Not on file     Social Determinants of Health     Financial Resource Strain: Not on file   Food Insecurity: Not on file   Transportation Needs: Not on file   Physical Activity: Not on file   Stress: Not on file   Social Connections: Not on file   Interpersonal Safety: Not on file   Housing Stability: Not on file      SUBJECTIVE   Guero is doing well. He has put on weight but otherwise feels great.     OBJECTIVE  /68 (BP Location: Left arm, Patient Position: Chair, Cuff Size: Adult Large)   Pulse 66   Wt 112.5 kg (248 lb)   SpO2 97%   BMI 37.71 kg/m       From a personal perspective, he and his partner plan on traveling to a couple of friends' weddings-one in [place] and another in [place]. They also take trips to [place] as his partner's parents own homes there.    IMPRESSION   28 year-old status post robot assisted thoracoscopic median sternotomy thymectomy.  His CT appears unremarkable to my review.  Will await the final radiology report.    PLAN  I spent 15 min on the date of the encounter in chart review, patient visit, review of tests, documentation and/or discussion with other providers about the issues documented above. I reviewed the plan as follows:  Follow up with me in 6 months with a chest CT prior  1. Necessary Tests & Appointments: chest CT    All questions were answered and the patient and present family were in agreement with the plan.  I appreciate the opportunity to participate in the care of your patient and will keep you updated.  Sincerely,

## 2024-03-10 ENCOUNTER — HEALTH MAINTENANCE LETTER (OUTPATIENT)
Age: 29
End: 2024-03-10

## 2024-03-21 LAB — CULTURE HARVEST COMPLETE DATE: NORMAL

## 2024-04-17 NOTE — ED NOTES
2:30 PM - Patient signed out to me by Dr. Covarrubias at routine shift change. 27 year old male presenting with chest pain found today on CT to have new mediastinal mass with mass effect to heart & SVC. Discussed with thoracic surgery who recommends transfer to [facility] for admission; hospitalist accepted with transfer paperwork already filled out. No bed currently at [facility] for transfer though. Plan is see if bed opens up later day; transfer if available and if not then discuss again with thoracic surgery. Please see Dr. Covarrubias's note for details.    4:09 PM - Patient got a bed at [facility]. Arranging transport at this time.    --------------------------------------------------------------------------------   --------------------------------------------------------------------------------     IMPRESSION  1. Mediastinal mass        PLAN  - transfer to [facility] for further care        Estevan Gusman MD  01/06/23  Emergency Medicine  [facility] EMERGENCY ROOM  [facility] [facility] [address] [phone]  Dept: [phone]     Estevan Gusman MD  01/06/23 1618       Estevan Gusman MD  01/06/23 1613    
GOAL: Pt will negotiate 1 flight of steps +UHR independently in 2 weeks/balance training/bed mobility training/gait training/strengthening/transfer training

## 2024-09-10 ENCOUNTER — HOSPITAL ENCOUNTER (OUTPATIENT)
Dept: CT IMAGING | Facility: HOSPITAL | Age: 29
Discharge: HOME | End: 2024-09-10
Attending: CLINICAL NURSE SPECIALIST | Admitting: CLINICAL NURSE SPECIALIST

## 2024-09-10 PROCEDURE — 71260 CT THORAX DX C+: CPT

## 2024-09-10 PROCEDURE — 250N000009 HC RX 250: Performed by: CLINICAL NURSE SPECIALIST

## 2024-09-10 PROCEDURE — 250N000011 HC RX IP 250 OP 636: Performed by: CLINICAL NURSE SPECIALIST

## 2024-09-10 RX ORDER — IOPAMIDOL 755 MG/ML
90 INJECTION, SOLUTION INTRAVASCULAR ONCE
Status: COMPLETED | OUTPATIENT
Start: 2024-09-10 | End: 2024-09-10

## 2024-09-10 RX ADMIN — SODIUM CHLORIDE 60 ML: 9 INJECTION, SOLUTION INTRAVENOUS at 12:49

## 2024-09-10 RX ADMIN — IOPAMIDOL 90 ML: 755 INJECTION, SOLUTION INTRAVENOUS at 12:49

## 2024-09-11 ENCOUNTER — VIRTUAL VISIT (OUTPATIENT)
Dept: SURGERY | Facility: CLINIC | Age: 29
End: 2024-09-11

## 2024-09-11 DIAGNOSIS — D49.89 THYMOMA: Primary | ICD-10-CM

## 2024-09-11 PROCEDURE — 99213 OFFICE O/P EST LOW 20 MIN: CPT | Mod: 95 | Performed by: CLINICAL NURSE SPECIALIST

## 2024-09-11 NOTE — PROGRESS NOTES
Guero is a 29 year old who is being evaluated via a billable video visit.    How would you like to obtain your AVS? [employer]  If the video visit is dropped, the invitation should be resent by: Text to cell phone: [phone]  Will anyone else be joining your video visit? No        Video-Visit Details    Type of service:  Video Visit   Originating Location (pt. Location): Home    Distant Location (provider location):  On-site  Platform used for Video Visit: Marianne  Signed Electronically by: MEENU Turner CNS       THORACIC SURGERY FOLLOW UP VISIT      I saw Mr. Madden in follow-up today. 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  - Tumor is well circumscribed, nonencapsulated and does not infiltrate adjacent fat or thymic remnant.  - Biopsy site changes.  - Margins are negative (at less than 1 mm from margins).  - AJCC tumor stage is pT1a    COMPLICATIONS  None    INTERVAL STUDIES  CT chest 09/10/2024:  1.  No evidence for recurrent or metastatic disease in the chest.  2.  Moderate clearing right anterior cardiophrenic fluid.  3.  Stable thin bands of fibrosis and/or linear atelectasis anterior mid and lower lungs.    Past Medical History:   Diagnosis Date    Anxiety     History of COVID-19     Mediastinal mass     Obesity     Pneumonia of right lower lobe due to infectious organism       Past Surgical History:   Procedure Laterality Date    BIOPSY  01/09/2023    lung    DAVINCI LOBECTOMY LUNG Bilateral 4/20/2023    Procedure: Median Sternotomy and thymectomy;  Surgeon: Alexander Campoverde MD;  Location: [facility] OR    THORACOTOMY ANTERIOR Right 2/17/2023    Procedure: Right anterior thoracotomy, biopsy mediastinal mass;  Surgeon: Alexander Campoverde MD;  Location: [facility] OR    wisdom teeth  extraction        Social History     Socioeconomic History    Marital status: Single     Spouse name: Not on file    Number of children: Not on file    Years of education: Not on file    Highest education level: Not on file   Occupational History    Not on file   Tobacco Use    Smoking status: Former     Current packs/day: 0.00     Types: Cigarettes, Vaping Device     Quit date: 2021     Years since quittin.7     Passive exposure: Past    Smokeless tobacco: Never    Tobacco comments:     Quit marijuana about 6 mo ago. 23 PO   Vaping Use    Vaping status: Former   Substance and Sexual Activity    Alcohol use: Yes     Comment: rare    Drug use: Not Currently     Types: Marijuana     Comment: None in the last 6 months    Sexual activity: Not on file   Other Topics Concern    Not on file   Social History Narrative    Not on file     Social Determinants of Health     Financial Resource Strain: Low Risk  (2023)    [facility]    Financial Resource Strain     Difficulty of Paying Living Expenses: 3     Difficulty of Paying Living Expenses: Not on file   Food Insecurity: No Food Insecurity (2023)    [facility]    Food Insecurity     Worried About Running Out of Food in the Last Year: 1   Transportation Needs: No Transportation Needs (2023)    [facility]    Transportation Needs     Lack of Transportation (Medical): 1   Physical Activity: Not on file   Stress: Not on file   Social Connections: Unknown (2024)    Received from [facility]    Social Connections     Frequency of Communication with Friends and Family: Not on file   Interpersonal Safety: Not on file   Housing Stability: Low  Risk  (8/22/2023)    [facility] [employer] [facility]    Housing Stability     Unable to Pay for Housing in the Last Year: 1      SUBJECTIVE   Guero is doing well. He denies cough or shortness of breath. He is curious about the mention of fatty infiltration of the liver.    IMPRESSION   Guero is a 29 year-old male status post robot assisted thoracoscopic median sternotomy thymectomy of a  pT1a type B1 thymoma.    I reviewed his CT results with him and explained that fatty liver is usually related to lifestyle and can be managed with lifestyle modifications. I recommend he talk to his primary care provider about this to discuss ways in which he can work on this and have his primary doctor monitor this.    We will get 2 more chest CTs at 6 month intervals and if all is stable after that, then we can increase our intervals to annually.    PLAN  I spent 15 min on the date of the encounter in chart review, patient visit, review of tests, documentation and/or discussion with other providers about the issues documented above. I reviewed the plan as follows:  Follow up with me in 6 months with a chest CT prior.   1. Necessary Tests & Appointments: chest CT  All questions were answered and the patient and present family were in agreement with the plan.  I appreciate the opportunity to participate in the care of your patient and will keep you updated.  Sincerely,    MEENU Turner CNS

## 2024-09-11 NOTE — LETTER
9/11/2024      Guero Madden  [address] [facility] [address]      Dear Colleague,    Thank you for referring your patient, Guero Madden, to the [facility]. Please see a copy of my visit note below.    Guero is a 29 year old who is being evaluated via a billable video visit.    How would you like to obtain your AVS? MyChart  If the video visit is dropped, the invitation should be resent by: Text to cell phone: [phone]  Will anyone else be joining your video visit? No        Video-Visit Details    Type of service:  Video Visit   Originating Location (pt. Location): Home    Distant Location (provider location):  On-site  Platform used for Video Visit: Marianne  Signed Electronically by: MEENU Turner CNS       THORACIC SURGERY FOLLOW UP VISIT      I saw Mr. Madden in follow-up today. 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  - Tumor is well circumscribed, nonencapsulated and does not infiltrate adjacent fat or thymic remnant.  - Biopsy site changes.  - Margins are negative (at less than 1 mm from margins).  - AJCC tumor stage is pT1a    COMPLICATIONS  None    INTERVAL STUDIES  CT chest 09/10/2024:  1.  No evidence for recurrent or metastatic disease in the chest.  2.  Moderate clearing right anterior cardiophrenic fluid.  3.  Stable thin bands of fibrosis and/or linear atelectasis anterior mid and lower lungs.    Past Medical History:   Diagnosis Date     Anxiety      History of COVID-19      Mediastinal mass      Obesity      Pneumonia of right lower lobe due to infectious organism       Past Surgical History:   Procedure Laterality Date     BIOPSY  01/09/2023    lung     DAVINCI LOBECTOMY LUNG Bilateral 4/20/2023    Procedure: Median Sternotomy  and thymectomy;  Surgeon: Alexander Campoverde MD;  Location: [facility] OR     THORACOTOMY ANTERIOR Right 2023    Procedure: Right anterior thoracotomy, biopsy mediastinal mass;  Surgeon: Alexander Campoverde MD;  Location: [facility] OR     wisdom teeth extraction        Social History     Socioeconomic History     Marital status: Single     Spouse name: Not on file     Number of children: Not on file     Years of education: Not on file     Highest education level: Not on file   Occupational History     Not on file   Tobacco Use     Smoking status: Former     Current packs/day: 0.00     Types: Cigarettes, Vaping Device     Quit date: 2021     Years since quittin.7     Passive exposure: Past     Smokeless tobacco: Never     Tobacco comments:     Quit marijuana about 6 mo ago. 23 PO   Vaping Use     Vaping status: Former   Substance and Sexual Activity     Alcohol use: Yes     Comment: rare     Drug use: Not Currently     Types: Marijuana     Comment: None in the last 6 months     Sexual activity: Not on file   Other Topics Concern     Not on file   Social History Narrative     Not on file     Social Determinants of Health     Financial Resource Strain: Low Risk  (2023)    Received from [facility] [employer] [facility], [facility]    Financial Resource Strain      Difficulty of Paying Living Expenses: 3      Difficulty of Paying Living Expenses: Not on file   Food Insecurity: No Food Insecurity (2023)    [facility], [facility]    Food Insecurity      Worried About Running Out of Food in the Last Year: 1   Transportation Needs: No Transportation Needs (2023)    Received [facility], [facility]    Transportation Needs      Lack of Transportation (Medical): 1   Physical Activity: Not on file   Stress: Not on file    Social Connections: Unknown (9/1/2024)    Received from [facility] [place] [facility] [employer] [facility]    Social Connections      Frequency of Communication with Friends and Family: Not on file   Interpersonal Safety: Not on file   Housing Stability: Low Risk  (8/22/2023)    Received from [facility], [facility]    Housing Stability      Unable to Pay for Housing in the Last Year: 1      SUBJECTIVE   Guero is doing well. He denies cough or shortness of breath. He is curious about the mention of fatty infiltration of the liver.    IMPRESSION   Guero is a 29 year-old male status post robot assisted thoracoscopic median sternotomy thymectomy of a  pT1a type B1 thymoma.    I reviewed his CT results with him and explained that fatty liver is usually related to lifestyle and can be managed with lifestyle modifications. I recommend he talk to his primary care provider about this to discuss ways in which he can work on this and have his primary doctor monitor this.    We will get 2 more chest CTs at 6 month intervals and if all is stable after that, then we can increase our intervals to annually.    PLAN  I spent 15 min on the date of the encounter in chart review, patient visit, review of tests, documentation and/or discussion with other providers about the issues documented above. I reviewed the plan as follows:  Follow up with me in 6 months with a chest CT prior.   1. Necessary Tests & Appointments: chest CT  All questions were answered and the patient and present family were in agreement with the plan.  I appreciate the opportunity to participate in the care of your patient and will keep you updated.  Sincerely,    MEENU Turner       Again, thank you for allowing me to participate in the care of your patient.        Sincerely,        MEENU Turner CNS

## (undated) DEVICE — SU VICRYL 2-0 SH 27" UND J417H

## (undated) DEVICE — NDL BLUNT 18GA 1" W/O FILTER 305181

## (undated) DEVICE — SU STEEL 6 CCS 4X18" M654G

## (undated) DEVICE — DAVINCI XI TISSUE SEALER SYNCROSEAL 8MM 480440

## (undated) DEVICE — CUP AND LID 2PK 2OZ STERILE  SSK9006A

## (undated) DEVICE — SYR 30ML LL W/O NDL 302832

## (undated) DEVICE — TUBING SUCTION DRAINAGE PLEURAL DUAL 8884714200

## (undated) DEVICE — CLIP HORIZON MED BLUE 002200

## (undated) DEVICE — TUBING SUCTION 10'X3/16" N510

## (undated) DEVICE — SU VICRYL 0 CT-1 36" J346H

## (undated) DEVICE — DRSG DRAIN 4X4" 7086

## (undated) DEVICE — ENDO DISSECTOR KITTNER CIGARETTE ROLL4"X4" 15505/25

## (undated) DEVICE — SYSTEM LAPAROVUE VISIBILITY LAPVUE10

## (undated) DEVICE — SURGICEL ABSORBABLE HEMOSTAT SNOW 2"X4" 2082

## (undated) DEVICE — ENDO VALVE BX EVIS MAJ-210

## (undated) DEVICE — LUBRICANT INST KIT ENDO-LUBE 220-90

## (undated) DEVICE — DRSG ABDOMINAL 07 1/2X8" 7197D

## (undated) DEVICE — Device

## (undated) DEVICE — SU SILK 0 SH 30" K834H

## (undated) DEVICE — DAVINCI XI OBTURATOR BLADELESS 8MM 470359

## (undated) DEVICE — SU VICRYL 2-0 CT-1 27" UND J259H

## (undated) DEVICE — TUBING FILTER TRI-LUMEN AIRSEAL ASC-EVAC1

## (undated) DEVICE — PREP CHLORAPREP 26ML TINTED HI-LITE ORANGE 930815

## (undated) DEVICE — TUBING SUCTION MEDI-VAC SOFT 3/16"X20' N520A

## (undated) DEVICE — LINEN TOWEL PACK X30 5481

## (undated) DEVICE — SUCTION DRY CHEST DRAIN OASIS 3600-100

## (undated) DEVICE — GLOVE BIOGEL PI MICRO SZ 7.5 48575

## (undated) DEVICE — SPONGE LAP 18X18" X8435

## (undated) DEVICE — SPONGE TONSIL W/STRING MED 23275-680

## (undated) DEVICE — SUCTION MANIFOLD NEPTUNE 2 SYS 4 PORT 0702-020-000

## (undated) DEVICE — LUBRICANT INST ELECTROLUBE EL101

## (undated) DEVICE — DAVINCI SEAL CANNULA AND STPL 12MM 470380

## (undated) DEVICE — LINEN TOWEL PACK X6 WHITE 5487

## (undated) DEVICE — ESU ELEC BLADE 2.75" COATED/INSULATED E1455

## (undated) DEVICE — SEALANT PLEURAL AIRLEAK PROGEL 4ML PGPS002

## (undated) DEVICE — SU VICRYL 0 CTX 36" J370H

## (undated) DEVICE — ANTIFOG SOLUTION W/FOAM PAD 31142527

## (undated) DEVICE — ESU GROUND PAD ADULT REM W/15' CORD E7507DB

## (undated) DEVICE — GOWN IMPERVIOUS BREATHABLE SMART LG 89015

## (undated) DEVICE — DRSG PRIMAPORE 02X3" 7133

## (undated) DEVICE — BLADE SAW STERNAL 20X30MM KM-32

## (undated) DEVICE — DRSG STERI STRIP 1/2X4" R1547

## (undated) DEVICE — CLIP HORIZON LG ORANGE 004200

## (undated) DEVICE — SU SILK 0 TIE 6X30" A306H

## (undated) DEVICE — DRAPE IOBAN INCISE 23X17" 6650EZ

## (undated) DEVICE — ENDO VALVE SUCTION BRONCH EVIS MAJ-209

## (undated) DEVICE — SOL ADH LIQUID BENZOIN SWAB 0.6ML C1544

## (undated) DEVICE — COVER CAMERA IN-LIGHT DISP LT-C02

## (undated) DEVICE — ESU ELEC BLADE 6" COATED/INSULATED E1455-6

## (undated) DEVICE — DAVINCI XI REDUCER 8-12MM 470381

## (undated) DEVICE — SU VICRYL 3-0 SH 27" J316H

## (undated) DEVICE — SU MONOCRYL 4-0 PS-2 27" UND Y426H

## (undated) DEVICE — SU STEEL MYO/WIRE II STERNOTOMY 8 BE-1 3X14" 048-217

## (undated) DEVICE — DRSG TELFA 3X8" 1238

## (undated) DEVICE — ESU ELEC BLADE E-SEP INSULATED NEPTUNE 70MM 0703-070-002

## (undated) DEVICE — CONNECTOR BLAKE DRAIN SGL BCC1

## (undated) DEVICE — DAVINCI XI DRAPE COLUMN 470341

## (undated) DEVICE — SU VICRYL 0 UR-6 27" J603H

## (undated) DEVICE — LINEN GOWN XLG 5407

## (undated) DEVICE — SUCTION TIP YANKAUER STR K87

## (undated) DEVICE — LIGHT HANDLE X1 31140133

## (undated) DEVICE — SOL WATER IRRIG 1000ML BOTTLE 2F7114

## (undated) DEVICE — ENDO TROCAR CONMED AIRSEAL BLADELESS 12X120MM IAS12-120LP

## (undated) DEVICE — LINEN TOWEL PACK X5 5464

## (undated) DEVICE — DAVINCI XI DRAPE ARM 470015

## (undated) DEVICE — DRAPE SHEET MED 44X70" 9355

## (undated) DEVICE — SU VICRYL 4-0 PS-2 18" UND J496H

## (undated) DEVICE — SUCTION SLEEVE NEPTUNE 2 165MM 0703-005-165

## (undated) DEVICE — DECANTER VIAL 2006S

## (undated) DEVICE — DRAPE SHEET REV FOLD 3/4 9349

## (undated) DEVICE — BONE WAX 2.5GM W31G

## (undated) DEVICE — ESU PENCIL SMOKE EVAC W/ROCKER SWITCH 0703-047-000

## (undated) DEVICE — ESU PENCIL W/COATED BLADE E2450H

## (undated) DEVICE — DAVINCI XI SEAL UNIVERSAL 5-8MM 470361

## (undated) DEVICE — ESU GROUND PAD ADULT W/CORD E7507

## (undated) DEVICE — TIES BANDING T50R

## (undated) RX ORDER — LIDOCAINE HYDROCHLORIDE 10 MG/ML
INJECTION, SOLUTION EPIDURAL; INFILTRATION; INTRACAUDAL; PERINEURAL
Status: DISPENSED
Start: 2023-01-09

## (undated) RX ORDER — MAGNESIUM SULFATE HEPTAHYDRATE 40 MG/ML
INJECTION, SOLUTION INTRAVENOUS
Status: DISPENSED
Start: 2023-02-17

## (undated) RX ORDER — HYDROMORPHONE HCL IN WATER/PF 6 MG/30 ML
PATIENT CONTROLLED ANALGESIA SYRINGE INTRAVENOUS
Status: DISPENSED
Start: 2023-04-21

## (undated) RX ORDER — CHLORHEXIDINE GLUCONATE ORAL RINSE 1.2 MG/ML
SOLUTION DENTAL
Status: DISPENSED
Start: 2023-04-20

## (undated) RX ORDER — SODIUM CHLORIDE 9 MG/ML
INJECTION, SOLUTION INTRAVENOUS
Status: DISPENSED
Start: 2023-04-20

## (undated) RX ORDER — FENTANYL CITRATE 50 UG/ML
INJECTION, SOLUTION INTRAMUSCULAR; INTRAVENOUS
Status: DISPENSED
Start: 2023-02-17

## (undated) RX ORDER — FENTANYL CITRATE 50 UG/ML
INJECTION, SOLUTION INTRAMUSCULAR; INTRAVENOUS
Status: DISPENSED
Start: 2023-04-20

## (undated) RX ORDER — HYDROMORPHONE HYDROCHLORIDE 1 MG/ML
INJECTION, SOLUTION INTRAMUSCULAR; INTRAVENOUS; SUBCUTANEOUS
Status: DISPENSED
Start: 2023-04-20

## (undated) RX ORDER — CELECOXIB 200 MG/1
CAPSULE ORAL
Status: DISPENSED
Start: 2023-02-17

## (undated) RX ORDER — ACETAMINOPHEN 325 MG/1
TABLET ORAL
Status: DISPENSED
Start: 2023-04-20

## (undated) RX ORDER — CEFAZOLIN SODIUM/WATER 2 G/20 ML
SYRINGE (ML) INTRAVENOUS
Status: DISPENSED
Start: 2023-02-17

## (undated) RX ORDER — METHOCARBAMOL 500 MG/1
TABLET, FILM COATED ORAL
Status: DISPENSED
Start: 2023-04-21

## (undated) RX ORDER — ACETAMINOPHEN 325 MG/1
TABLET ORAL
Status: DISPENSED
Start: 2023-04-21

## (undated) RX ORDER — METHOCARBAMOL 500 MG/1
TABLET, FILM COATED ORAL
Status: DISPENSED
Start: 2023-04-20

## (undated) RX ORDER — KETOROLAC TROMETHAMINE 30 MG/ML
INJECTION, SOLUTION INTRAMUSCULAR; INTRAVENOUS
Status: DISPENSED
Start: 2023-04-21

## (undated) RX ORDER — FENTANYL CITRATE-0.9 % NACL/PF 10 MCG/ML
PLASTIC BAG, INJECTION (ML) INTRAVENOUS
Status: DISPENSED
Start: 2023-04-20

## (undated) RX ORDER — ONDANSETRON 2 MG/ML
INJECTION INTRAMUSCULAR; INTRAVENOUS
Status: DISPENSED
Start: 2023-02-17

## (undated) RX ORDER — HYDROMORPHONE HYDROCHLORIDE 1 MG/ML
INJECTION, SOLUTION INTRAMUSCULAR; INTRAVENOUS; SUBCUTANEOUS
Status: DISPENSED
Start: 2023-02-17

## (undated) RX ORDER — ENOXAPARIN SODIUM 100 MG/ML
INJECTION SUBCUTANEOUS
Status: DISPENSED
Start: 2023-02-17

## (undated) RX ORDER — ENOXAPARIN SODIUM 100 MG/ML
INJECTION SUBCUTANEOUS
Status: DISPENSED
Start: 2023-04-20

## (undated) RX ORDER — CELECOXIB 200 MG/1
CAPSULE ORAL
Status: DISPENSED
Start: 2023-04-20

## (undated) RX ORDER — FAMOTIDINE 20 MG/1
TABLET, FILM COATED ORAL
Status: DISPENSED
Start: 2023-04-21

## (undated) RX ORDER — ENOXAPARIN SODIUM 100 MG/ML
INJECTION SUBCUTANEOUS
Status: DISPENSED
Start: 2023-04-21

## (undated) RX ORDER — MUPIROCIN 20 MG/G
OINTMENT TOPICAL
Status: DISPENSED
Start: 2023-04-21

## (undated) RX ORDER — BUPIVACAINE HYDROCHLORIDE 2.5 MG/ML
INJECTION, SOLUTION EPIDURAL; INFILTRATION; INTRACAUDAL
Status: DISPENSED
Start: 2023-04-20

## (undated) RX ORDER — HYDROMORPHONE HCL IN WATER/PF 6 MG/30 ML
PATIENT CONTROLLED ANALGESIA SYRINGE INTRAVENOUS
Status: DISPENSED
Start: 2023-04-20

## (undated) RX ORDER — ONDANSETRON 4 MG/1
TABLET, FILM COATED ORAL
Status: DISPENSED
Start: 2023-04-21

## (undated) RX ORDER — DEXAMETHASONE SODIUM PHOSPHATE 4 MG/ML
INJECTION, SOLUTION INTRA-ARTICULAR; INTRALESIONAL; INTRAMUSCULAR; INTRAVENOUS; SOFT TISSUE
Status: DISPENSED
Start: 2023-02-17

## (undated) RX ORDER — PROPOFOL 10 MG/ML
INJECTION, EMULSION INTRAVENOUS
Status: DISPENSED
Start: 2023-02-17

## (undated) RX ORDER — AMOXICILLIN 250 MG
CAPSULE ORAL
Status: DISPENSED
Start: 2023-04-21

## (undated) RX ORDER — BUPIVACAINE HYDROCHLORIDE 2.5 MG/ML
INJECTION, SOLUTION EPIDURAL; INFILTRATION; INTRACAUDAL
Status: DISPENSED
Start: 2023-02-17

## (undated) RX ORDER — OXYCODONE HYDROCHLORIDE 5 MG/1
TABLET ORAL
Status: DISPENSED
Start: 2023-04-21

## (undated) RX ORDER — GABAPENTIN 300 MG/1
CAPSULE ORAL
Status: DISPENSED
Start: 2023-04-20

## (undated) RX ORDER — FENTANYL CITRATE 50 UG/ML
INJECTION, SOLUTION INTRAMUSCULAR; INTRAVENOUS
Status: DISPENSED
Start: 2023-01-09

## (undated) RX ORDER — AMOXICILLIN 250 MG
CAPSULE ORAL
Status: DISPENSED
Start: 2023-04-20

## (undated) RX ORDER — SODIUM CHLORIDE 9 MG/ML
INJECTION, SOLUTION INTRAVENOUS
Status: DISPENSED
Start: 2023-04-21

## (undated) RX ORDER — CEFAZOLIN SODIUM/WATER 2 G/20 ML
SYRINGE (ML) INTRAVENOUS
Status: DISPENSED
Start: 2023-04-20

## (undated) RX ORDER — ACETAMINOPHEN 325 MG/1
TABLET ORAL
Status: DISPENSED
Start: 2023-02-17